# Patient Record
Sex: MALE | Race: WHITE | NOT HISPANIC OR LATINO | Employment: UNEMPLOYED | URBAN - METROPOLITAN AREA
[De-identification: names, ages, dates, MRNs, and addresses within clinical notes are randomized per-mention and may not be internally consistent; named-entity substitution may affect disease eponyms.]

---

## 2017-05-14 ENCOUNTER — NON-PROVIDER VISIT (OUTPATIENT)
Dept: URGENT CARE | Facility: PHYSICIAN GROUP | Age: 57
End: 2017-05-14

## 2017-05-14 DIAGNOSIS — Z02.1 PRE-EMPLOYMENT DRUG SCREENING: ICD-10-CM

## 2017-05-14 LAB
AMP AMPHETAMINE: NORMAL
COC COCAINE: NORMAL
INT CON NEG: NORMAL
INT CON POS: NORMAL
MET METHAMPHETAMINES: NORMAL
OPI OPIATES: NORMAL
PCP PHENCYCLIDINE: NORMAL
POC DRUG COMMENT 753798-OCCUPATIONAL HEALTH: NEGATIVE
THC: NORMAL

## 2017-05-14 PROCEDURE — 80305 DRUG TEST PRSMV DIR OPT OBS: CPT | Performed by: FAMILY MEDICINE

## 2017-07-12 ENCOUNTER — OCCUPATIONAL MEDICINE (OUTPATIENT)
Dept: URGENT CARE | Facility: CLINIC | Age: 57
End: 2017-07-12
Payer: COMMERCIAL

## 2017-07-12 VITALS
HEART RATE: 80 BPM | RESPIRATION RATE: 16 BRPM | HEIGHT: 76 IN | SYSTOLIC BLOOD PRESSURE: 144 MMHG | DIASTOLIC BLOOD PRESSURE: 90 MMHG | WEIGHT: 220 LBS | TEMPERATURE: 97.7 F | OXYGEN SATURATION: 96 % | BODY MASS INDEX: 26.79 KG/M2

## 2017-07-12 DIAGNOSIS — Z23 NEED FOR TETANUS BOOSTER: ICD-10-CM

## 2017-07-12 DIAGNOSIS — S51.832A PUNCTURE WOUND OF LEFT FOREARM, INITIAL ENCOUNTER: ICD-10-CM

## 2017-07-12 DIAGNOSIS — Z02.1 PRE-EMPLOYMENT DRUG SCREENING: ICD-10-CM

## 2017-07-12 LAB
AMP AMPHETAMINE: NORMAL
BREATH ALCOHOL COMMENT: NEGATIVE
COC COCAINE: NORMAL
INT CON NEG: NORMAL
INT CON POS: NORMAL
MET METHAMPHETAMINES: NORMAL
OPI OPIATES: NORMAL
PCP PHENCYCLIDINE: NORMAL
POC BREATHALIZER: 0 PERCENT (ref 0–0.01)
POC DRUG COMMENT 753798-OCCUPATIONAL HEALTH: NORMAL
THC: NORMAL

## 2017-07-12 PROCEDURE — 80305 DRUG TEST PRSMV DIR OPT OBS: CPT | Mod: 29 | Performed by: NURSE PRACTITIONER

## 2017-07-12 PROCEDURE — 99213 OFFICE O/P EST LOW 20 MIN: CPT | Mod: 29,25 | Performed by: NURSE PRACTITIONER

## 2017-07-12 PROCEDURE — 90471 IMMUNIZATION ADMIN: CPT | Mod: 29 | Performed by: NURSE PRACTITIONER

## 2017-07-12 PROCEDURE — 90715 TDAP VACCINE 7 YRS/> IM: CPT | Mod: 29 | Performed by: NURSE PRACTITIONER

## 2017-07-12 PROCEDURE — 82075 ASSAY OF BREATH ETHANOL: CPT | Mod: 29 | Performed by: NURSE PRACTITIONER

## 2017-07-12 RX ORDER — ACETAMINOPHEN 325 MG/1
650 TABLET ORAL EVERY 4 HOURS PRN
COMMUNITY
End: 2018-12-15

## 2017-07-12 ASSESSMENT — ENCOUNTER SYMPTOMS
FEVER: 0
DIAPHORESIS: 0
CHILLS: 0
WEAKNESS: 0

## 2017-07-12 NOTE — Clinical Note
[facility]   [address], [address] [place] [address]  Phone: [phone] - Fax: [phone]        [facility] Progress Report and Disability Certification  Date of Service: 7/12/2017   No Show:  No  Date / Time of Next Visit:     Claim Information   Patient Name: Ajit Gregory  Claim Number:     Employer: [employer]  Date of Injury: 7/12/2017     Insurer / TPA: Sandra Assigned Risk  ID / SSN:     Occupation:   Diagnosis: Diagnoses of Puncture wound of left forearm, initial encounter and Need for tetanus booster were pertinent to this visit.    Medical Information   Related to Industrial Injury? Yes    Subjective Complaints:  DOI 7/12/17  Patient was at work moving a crate.  There was a nail sticking out of the crate, which caused a puncture wound to the left forearm.  The nail appears to be somewhat old and jluis.  Patient cleaned the wound and bleeding was minimal.  He denies any muscle weakness, numbness, or tinging of the arm or hand/fingers.  He is not up to date on his tetanus vaccination.  No prior similar injury.  No baseline limitations or chronic pain.  Not taking any medication to treat the symptoms.  No second job or recreational activity as suspected causative factor.   Objective Findings: Patient is alter, oriented, and in no acute distress.  Puncture wound of the inner aspect of the left forearm.  Wound measures 3 mm long and roughly 1 mm deep.  No active bleeding.  No exposed musculature or bones.  Superficial nature requires no suture or active wound closure.  No erythema, purulence, or pain.  Muscle strength and tone intact.  Sensation intact.  Tdap vaccination administered in clinic.  Patient tolerated well.   Pre-Existing Condition(s):     Assessment:   Initial Visit    Status: Discharged /  MMI  Permanent Disability:No    Plan:   Comments:Keep wound clean and dry.  OTC tylenol or ibuprofen as needed for pain.       Diagnostics:      Comments:       Disability Information   Status: Released to Full Duty    From:     Through:   Restrictions are:     Physical Restrictions   Sitting:    Standing:    Stooping:    Bending:      Squatting:    Walking:    Climbing:    Pushing:      Pulling:    Other:    Reaching Above Shoulder (L):   Reaching Above Shoulder (R):       Reaching Below Shoulder (L):    Reaching Below Shoulder (R):      Not to exceed Weight Limits   Carrying(hrs):   Weight Limit(lb):   Lifting(hrs):   Weight  Limit(lb):     Comments:      Repetitive Actions   Hands: i.e. Fine Manipulations from Grasping:     Feet: i.e. Operating Foot Controls:     Driving / Operate Machinery:     Physician Name: CUATE Bailon Physician Signature: AICHA Carmona e-Signature: Dr. Darnell Omer, Medical Director   Clinic Name / Location: [address], [address] [place] [id number] Clinic Phone Number: Dept: [phone]   Appointment Time: 8:15 Am Visit Start Time: 8:42 AM   Check-In Time:  8:17 Am Visit Discharge Time:  8:55 AM   Original-Treating Physician or Chiropractor    Page 2-Insurer/TPA    Page 3-Employer    Page 4-Employee

## 2017-07-12 NOTE — MR AVS SNAPSHOT
"        Ajit Gregory   2017 8:15 AM   Occupational Medicine   MRN: [id number]    Department:  [facility]   Dept Phone:  [phone]    Description:  Male : 1960   Provider:  CUATE Bailon           Reason for Visit     Puncture Wound puncture wound right forearm today; need for Tdap      Allergies as of 2017     No Known Allergies      You were diagnosed with     Puncture wound of left forearm, initial encounter   [[id number]]       Need for tetanus booster   [[id number]]         Vital Signs     Blood Pressure Pulse Temperature Respirations Height Weight    144/90 mmHg 80 36.5 °C (97.7 °F) 16 1.93 m (6' 3.98\") 99.791 kg (220 lb)    Body Mass Index Oxygen Saturation                26.79 kg/m2 96%          Basic Information     Date Of Birth Sex Race Ethnicity Preferred Language    1960 Male White Non- English      Health Maintenance     Patient has no pending health maintenance at this time      Current Immunizations     Tdap Vaccine  Incomplete      Below and/or attached are the medications your provider expects you to take. Review all of your home medications and newly ordered medications with your provider and/or pharmacist. Follow medication instructions as directed by your provider and/or pharmacist. Please keep your medication list with you and share with your provider. Update the information when medications are discontinued, doses are changed, or new medications (including over-the-counter products) are added; and carry medication information at all times in the event of emergency situations     Allergies:  No Known Allergies          Medications  Valid as of: 2017 -  9:00 AM    Generic Name Brand Name Tablet Size Instructions for use    Acetaminophen (Tab) TYLENOL 325 MG Take 650 mg by mouth every four hours as needed.        Ibuprofen (Tab) MOTRIN 800 MG Take 1 Tab by mouth every 8 hours as needed.        .                 Medicines prescribed today " were sent to:     None      Medication refill instructions:       If your prescription bottle indicates you have medication refills left, it is not necessary to call your provider’s office. Please contact your pharmacy and they will refill your medication.    If your prescription bottle indicates you do not have any refills left, you may request refills at any time through one of the following ways: The online [employer] system (except Urgent Care), by calling your provider’s office, or by asking your pharmacy to contact your provider’s office with a refill request. Medication refills are processed only during regular business hours and may not be available until the next business day. Your provider may request additional information or to have a follow-up visit with you prior to refilling your medication.   *Please Note: Medication refills are assigned a new Rx number when refilled electronically. Your pharmacy may indicate that no refills were authorized even though a new prescription for the same medication is available at the pharmacy. Please request the medicine by name with the pharmacy before contacting your provider for a refill.           [employer] Access Code: [email or web address]  Expires: 8/11/2017  9:00 AM    [employer]  A secure, online tool to manage your health information     [employer]’s [employer]® is a secure, online tool that connects you to your personalized health information from the privacy of your home -- day or night - making it very easy for you to manage your healthcare. Once the activation process is completed, you can even access your medical information using the [employer] oni, which is available for free in the Apple Oni store or Google Play store.     [employer] provides the following levels of access (as shown below):   My Chart Features   [facility] Doctor [facility]  Specialists [facility]  Urgent  Care Non-[facility]  Primary Care  Doctor   Email your healthcare team securely and privately 24/7 X  X X    Manage appointments: schedule your next appointment; view details of past/upcoming appointments X      Request prescription refills. X      View recent personal medical records, including lab and immunizations X X X X   View health record, including health history, allergies, medications X X X X   Read reports about your outpatient visits, procedures, consult and ER notes X X X X   See your discharge summary, which is a recap of your hospital and/or ER visit that includes your diagnosis, lab results, and care plan. X X       How to register for [employer]:  1. Go to  [email or web address].  2. Click on the Sign Up Now box, which takes you to the New Member Sign Up page. You will need to provide the following information:  a. Enter your [employer] Access Code exactly as it appears at the top of this page. (You will not need to use this code after you’ve completed the sign-up process. If you do not sign up before the expiration date, you must request a new code.)   b. Enter your date of birth.   c. Enter your home email address.   d. Click Submit, and follow the next screen’s instructions.  3. Create a Re Pet ID. This will be your [employer] login ID and cannot be changed, so think of one that is secure and easy to remember.  4. Create a [employer] password. You can change your password at any time.  5. Enter your Password Reset Question and Answer. This can be used at a later time if you forget your password.   6. Enter your e-mail address. This allows you to receive e-mail notifications when new information is available in Re Pet.  7. Click Sign Up. You can now view your health information.    For assistance activating your [employer] account, call [phone]

## 2017-07-12 NOTE — PROGRESS NOTES
"Subjective:      Ajit Gregory is a 56 y.o. male who presents with Puncture Wound      DOI 7/12/17  Patient was at work moving a crate.  There was a nail sticking out of the crate, which caused a puncture wound to the left forearm.  The nail appears to be somewhat old and jluis.  Patient cleaned the wound and bleeding was minimal.  He denies any muscle weakness, numbness, or tinging of the arm or hand/fingers.  He is not up to date on his tetanus vaccination.  No prior similar injury.  No baseline limitations or chronic pain.  Not taking any medication to treat the symptoms.  No second job or recreational activity as suspected causative factor.     Puncture Wound         Review of Systems   Constitutional: Negative for fever, chills, malaise/fatigue and diaphoresis.   Neurological: Negative for weakness.     Medications, Allergies, and current problem list reviewed today in Epic     Objective:     /90 mmHg  Pulse 80  Temp(Src) 36.5 °C (97.7 °F)  Resp 16  Ht 1.93 m (6' 3.98\")  Wt 99.791 kg (220 lb)  BMI 26.79 kg/m2  SpO2 96%     Physical Exam    Patient is alter, oriented, and in no acute distress.  Puncture wound of the inner aspect of the left forearm.  Wound measures 3 mm long and roughly 1 mm deep.  No active bleeding.  No exposed musculature or bones.  Superficial nature requires no suture or active wound closure.  No erythema, purulence, or pain.  Muscle strength and tone intact.  Sensation intact.  Tdap vaccination administered in clinic.  Patient tolerated well.     In clinic medication: Tdap vaccine  Patient tolerated well.    Assessment/Plan:     1. Puncture wound of left forearm, initial encounter  Tdap =>6yo IM   2. Need for tetanus booster  Tdap =>7yo IM     Discussed exam findings with patient.  Keep wound clean and dry.  OTC analgesics prn pain.  Discharged MMI.  Patient verbalized understanding of and agreed with plan of care.        "

## 2017-07-12 NOTE — Clinical Note
"EMPLOYEE’S CLAIM FOR COMPENSATION/ REPORT OF INITIAL TREATMENT  FORM C-4    EMPLOYEE’S CLAIM - PROVIDE ALL INFORMATION REQUESTED   First Name  Ajit Last Name  Colt Birthdate                    1960                Sex  male Claim Number   Home Address  [address] Age  56 y.o. Height  1.93 m (6' 3.98\") Weight  99.791 kg (220 lb) [facility]     [place] Zip  [address] Telephone  [phone] (home)    Mailing Address  [address] [place] Zip  [address] Primary Language Spoken  English    Insurer  Sandra Assigned Risk Third Party   Sandra Assigned Risk   Employee's Occupation (Job Title) When Injury or Occupational Disease Occurred      Employer's Name  [employer]  Telephone  [phone]    Employer Address  [address]  Zip  [address]    Date of Injury  7/12/2017               Hour of Injury  7:45 AM Date Employer Notified  7/12/2017 Last Day of Work after Injury or Occupational Disease  7/12/2017 Supervisor to Whom Injury Reported  Ronen   Address or Location of Accident (if applicable)  [[address]]   What were you doing at the time of accident? (if applicable)  Uncrating machinery    How did this injury or occupational disease occur? (Be specific an answer in detail. Use additional sheet if necessary)  Pulling wood off crate,boaurd woth nails in it came back and struck my arm.   If you believe that you have an occupational disease, when did you first have knowledge of the disability and it relationship to your employment?  n/a Witnesses to the Accident  Cj      Nature of Injury or Occupational Disease  Workers' Compensation  Part(s) of Body Injured or Affected  Lower Arm (L), ,     I certify that the above is true and correct to the best of my knowledge and that I have provided this information in order to obtain the benefits of " [place]  I hereby authorize any physician, chiropractor, surgeon, practitioner, or other person, any hospital, including [facility] or [facility], any medical service organization, any insurance company, or other institution or organization to release to each other, any medical or other information, including benefits paid or payable, pertinent to this injury or disease, except information relative to diagnosis, treatment and/or counseling for AIDS, psychological conditions, alcohol or controlled substances, for which I must give specific authorization.  A Photostat of this authorization shall be as valid as the original.     Date 7/12/17   Place [facility]   Employee’s Signature   THIS REPORT MUST BE COMPLETED AND MAILED WITHIN 3 WORKING DAYS OF TREATMENT   Place  [facility]  Name of Facility  [facility]   Date  7/12/2017 Diagnosis  (S51.832A) Puncture wound of left forearm, initial encounter  (Z23) Need for tetanus booster Is there evidence the injured employee was under the influence of alcohol and/or another controlled substance at the time of accident?   Hour  8:42 AM Description of Injury or Disease  Diagnoses of Puncture wound of left forearm, initial encounter and Need for tetanus booster were pertinent to this visit. No   Treatment  Keep wound clean and dry.  Tetanus vaccine administered in clinic.  Have you advised the patient to remain off work five days or more? No   X-Ray Findings      If Yes   From Date  To Date      From information given by the employee, together with medical evidence, can you directly connect this injury or occupational disease as job incurred?  Yes If No Full Duty  Yes Modified Duty      Is additional medical care by a physician indicated?  No  Comments:Discharged MMI. If Modified Duty, Specify any Limitations /  "Restrictions      Do you know of any previous injury or disease contributing to this condition or occupational disease?                            No   Date  7/12/2017 Print Doctor’s Name CUATE Bailon I certify the employer’s copy of  this form was mailed on:   Address  [address], [address] Insurer’s Use Only     [facility] [address] [facility] [address] Zip  [address]    Provider’s Tax ID Number  [id number]  Telephone  Dept: [phone]        e-AICHA Andrews   e-Signature: Dr. Darnell Omer, Medical Director Degree  APRN        ORIGINAL-TREATING PHYSICIAN OR CHIROPRACTOR    PAGE 2-INSURER/TPA    PAGE 3-EMPLOYER    PAGE 4-EMPLOYEE             Form C-4 (rev10/07)              BRIEF DESCRIPTION OF RIGHTS AND BENEFITS  (Pursuant to [place]RS 616C.050)    Notice of Injury or Occupational Disease (Incident Report Form C-1): If an injury or occupational disease (OD) arises out of and in the  course of employment, you must provide written notice to your employer as soon as practicable, but no later than 7 days after the accident or  OD. Your employer shall maintain a sufficient supply of the required forms.    Claim for Compensation (Form C-4): If medical treatment is sought, the form C-4 is available at the place of initial treatment. A completed  \"Claim for Compensation\" (Form C-4) must be filed within 90 days after an accident or OD. The treating physician or chiropractor must,  within 3 working days after treatment, complete and mail to the employer, the employer's insurer and third-party , the Claim for  Compensation.    Medical Treatment: If you require medical treatment for your on-the-job injury or OD, you may be required to select a physician or  chiropractor from a list provided by your workers’ compensation insurer, if it has contracted with an Organization for Managed Care (MCO) or  Preferred Provider Organization (PPO) or providers of health care. If your employer has not entered into " a contract with an MCO or PPO, you  may select a physician or chiropractor from the Panel of Physicians and Chiropractors. Any medical costs related to your industrial injury or  OD will be paid by your insurer.    Temporary Total Disability (TTD): If your doctor has certified that you are unable to work for a period of at least 5 consecutive days, or 5  cumulative days in a 20-day period, or places restrictions on you that your employer does not accommodate, you may be entitled to TTD  compensation.    Temporary Partial Disability (TPD): If the wage you receive upon reemployment is less than the compensation for TTD to which you are  entitled, the insurer may be required to pay you TPD compensation to make up the difference. TPD can only be paid for a maximum of 24  months.    Permanent Partial Disability (PPD): When your medical condition is stable and there is an indication of a PPD as a result of your injury or  OD, within 30 days, your insurer must arrange for an evaluation by a rating physician or chiropractor to determine the degree of your PPD. The  amount of your PPD award depends on the date of injury, the results of the PPD evaluation and your age and wage.    Permanent Total Disability (PTD): If you are medically certified by a treating physician or chiropractor as permanently and totally disabled  and have been granted a PTD status by your insurer, you are entitled to receive monthly benefits not to exceed 66 2/3% of your average  monthly wage. The amount of your PTD payments is subject to reduction if you previously received a PPD award.    Vocational Rehabilitation Services: You may be eligible for vocational rehabilitation services if you are unable to return to the job due to a  permanent physical impairment or permanent restrictions as a result of your injury or occupational disease.    Transportation and Per Forest Reimbursement: You may be eligible for travel expenses and per forest associated with  medical treatment.    Reopening: You may be able to reopen your claim if your condition worsens after claim closure.    Appeal Process: If you disagree with a written determination issued by the insurer or the insurer does not respond to your request, you may  appeal to the Department of Administration, , by following the instructions contained in your determination letter. You must  appeal the determination within 70 days from the date of the determination letter at [address], [address], [place], [place]  [address], or [address], [address], [place], [place] [address]. If you disagree with the  decision, you may appeal to the  Department of Administration, . You must file your appeal within 30 days from the date of the  decision  letter at [address], [address], [place], [place] [address], or [address], [address], [place], [place] [address]. If you  disagree with a decision of an , you may file a petition for judicial review with the District Court. You must do so within 30  days of the Appeal Officer’s decision. You may be represented by an  at your own expense or you may contact the [facility] for possible  representation.    [place]  for Injured Workers (NAIW): If you disagree with a  decision, you may request that NAIW represent you  without charge at an  Hearing. For information regarding denial of benefits, you may contact the [facility] at: [address], [address], [place] [address], [phone], or [address], [address], [place] [address], [phone]    To File a Complaint with the Division: If you wish to file a complaint with the  of the Division of Industrial Relations (DIR),  please contact the Workers’ Compensation Section, [address], [address], [place], [place] [address], telephone [phone]) [phone],  or  [address], [address], [place], [place] [address], telephone [phone]) [phone].    For assistance with Workers’ Compensation Issues: you may contact the Office of the Governor Consumer Health Assistance, [address] [place] [address], [address], [place], [place] [address], Toll Free 1-266.823.1286, Web site: http://[employer].[facility].nv., E-mail  Maria [email or web address].                                                                                                                                                                                                                                   __________________________________________________________________                                                                   ______7/12/17___________                Employee Name / Signature                                                                                                                                                       Date                                                                                                                                                                                                     D-2 (rev. 10/07)

## 2018-09-10 ENCOUNTER — NON-PROVIDER VISIT (OUTPATIENT)
Dept: URGENT CARE | Facility: CLINIC | Age: 58
End: 2018-09-10

## 2018-09-10 DIAGNOSIS — Z02.1 PRE-EMPLOYMENT DRUG SCREENING: ICD-10-CM

## 2018-09-10 LAB
BREATH ALCOHOL COMMENT: NORMAL
POC BREATHALIZER: 0 PERCENT (ref 0–0.01)

## 2018-09-10 PROCEDURE — 82075 ASSAY OF BREATH ETHANOL: CPT | Performed by: FAMILY MEDICINE

## 2018-09-10 PROCEDURE — 80305 DRUG TEST PRSMV DIR OPT OBS: CPT | Performed by: FAMILY MEDICINE

## 2018-11-06 ENCOUNTER — HOSPITAL ENCOUNTER (EMERGENCY)
Dept: HOSPITAL 8 - ED | Age: 58
Discharge: HOME | End: 2018-11-06
Payer: COMMERCIAL

## 2018-11-06 VITALS — DIASTOLIC BLOOD PRESSURE: 86 MMHG | SYSTOLIC BLOOD PRESSURE: 173 MMHG

## 2018-11-06 VITALS — WEIGHT: 266.76 LBS | BODY MASS INDEX: 32.48 KG/M2 | HEIGHT: 76 IN

## 2018-11-06 DIAGNOSIS — S39.012A: Primary | ICD-10-CM
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
## 2018-11-06 DIAGNOSIS — F17.210: ICD-10-CM

## 2018-11-06 PROCEDURE — 99284 EMERGENCY DEPT VISIT MOD MDM: CPT

## 2018-11-06 PROCEDURE — 72110 X-RAY EXAM L-2 SPINE 4/>VWS: CPT

## 2018-12-04 ENCOUNTER — HOSPITAL ENCOUNTER (EMERGENCY)
Dept: HOSPITAL 8 - ED | Age: 58
Discharge: HOME | End: 2018-12-04
Payer: COMMERCIAL

## 2018-12-04 DIAGNOSIS — S39.012A: Primary | ICD-10-CM
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
## 2018-12-04 DIAGNOSIS — G89.29: ICD-10-CM

## 2018-12-04 PROCEDURE — 99283 EMERGENCY DEPT VISIT LOW MDM: CPT

## 2018-12-04 PROCEDURE — 96372 THER/PROPH/DIAG INJ SC/IM: CPT

## 2018-12-08 ENCOUNTER — HOSPITAL ENCOUNTER (OUTPATIENT)
Dept: RADIOLOGY | Facility: MEDICAL CENTER | Age: 58
End: 2018-12-08
Attending: PHYSICIAN ASSISTANT
Payer: COMMERCIAL

## 2018-12-08 ENCOUNTER — OCCUPATIONAL MEDICINE (OUTPATIENT)
Dept: URGENT CARE | Facility: PHYSICIAN GROUP | Age: 58
End: 2018-12-08
Payer: COMMERCIAL

## 2018-12-08 VITALS
SYSTOLIC BLOOD PRESSURE: 138 MMHG | HEART RATE: 104 BPM | HEIGHT: 76 IN | TEMPERATURE: 97.5 F | WEIGHT: 257 LBS | OXYGEN SATURATION: 95 % | BODY MASS INDEX: 31.29 KG/M2 | DIASTOLIC BLOOD PRESSURE: 84 MMHG

## 2018-12-08 DIAGNOSIS — S39.92XD INJURY OF BACK, SUBSEQUENT ENCOUNTER: Primary | ICD-10-CM

## 2018-12-08 DIAGNOSIS — S39.92XD INJURY OF BACK, SUBSEQUENT ENCOUNTER: ICD-10-CM

## 2018-12-08 PROCEDURE — 99214 OFFICE O/P EST MOD 30 MIN: CPT | Mod: 29 | Performed by: PHYSICIAN ASSISTANT

## 2018-12-08 PROCEDURE — 72100 X-RAY EXAM L-S SPINE 2/3 VWS: CPT

## 2018-12-08 RX ORDER — KETOROLAC TROMETHAMINE 30 MG/ML
60 INJECTION, SOLUTION INTRAMUSCULAR; INTRAVENOUS ONCE
Status: COMPLETED | OUTPATIENT
Start: 2018-12-08 | End: 2018-12-08

## 2018-12-08 RX ORDER — NAPROXEN 500 MG/1
500 TABLET ORAL 2 TIMES DAILY WITH MEALS
Qty: 60 TAB | Refills: 0 | Status: SHIPPED | OUTPATIENT
Start: 2018-12-08 | End: 2018-12-22

## 2018-12-08 RX ORDER — METHYLPREDNISOLONE 4 MG/1
4 TABLET ORAL DAILY
Qty: 1 KIT | Refills: 0 | Status: SHIPPED | OUTPATIENT
Start: 2018-12-08 | End: 2020-12-03

## 2018-12-08 RX ADMIN — KETOROLAC TROMETHAMINE 60 MG: 30 INJECTION, SOLUTION INTRAMUSCULAR; INTRAVENOUS at 12:21

## 2018-12-08 ASSESSMENT — ENCOUNTER SYMPTOMS
SHORTNESS OF BREATH: 0
TINGLING: 1
FALLS: 1
CONSTIPATION: 0
COUGH: 0
BACK PAIN: 1
DIARRHEA: 0
CHILLS: 0
NAUSEA: 0
VOMITING: 0
ABDOMINAL PAIN: 0
FEVER: 0
WEAKNESS: 1

## 2018-12-08 NOTE — LETTER
"   [facility] [place]  [address]  [place] [address]  Phone:  [phone] - Fax:  [phone]   [facility] Progress Report and Disability Certification  Date of Service: 12/8/2018   No Show:  No  Date / Time of Next Visit:     Claim Information   Patient Name: Ajit Gregory  Claim Number:     Employer: [employer]  Date of Injury: 10/29/2018     Insurer / TPA: John [place]  ID / SSN:     Occupation: [employer]  Diagnosis: The encounter diagnosis was Injury of back, subsequent encounter.    Medical Information   Related to Industrial Injury? Yes    Subjective Complaints:  DOI 10/29/18: Pt slipped backward on metal and hit head on the ground. Pt was off for 1 week, return to work 11/5/18 and sent home the rest of the week. Pt was originally evaluated by [facility]. Pain is persistent, described as a 12/10 worse with standing. Pt is currently treating Naproxen 500 mg BID x 1 week without improvement. Pt describes as sharp pain on the anterior L thigh with numbness and tingling. Pt denies bowel or bladder incontinence. Pt had XR of lower back done at [facility] without fracture or movement of hardware that was in place from previous surgery. Back surgery done 3 years ago fusion of L4-6 done by [facility].    Objective Findings: /84 (BP Location: Right arm, Patient Position: Sitting)   Pulse (!) 104   Temp 36.4 °C (97.5 °F) (Temporal)   Ht 1.93 m (6' 4\")   Wt 116.6 kg (257 lb)   SpO2 95%   BMI 31.28 kg/m²      Physical Exam   Constitutional: He is oriented to person, place, and time. He appears well-developed and well-nourished. No distress.   HENT:   Head: Normocephalic and atraumatic.   Eyes: Pupils are equal, round, and reactive to light. Conjunctivae are normal.   Cardiovascular: Normal rate and regular rhythm.    Pulmonary/Chest: Effort normal and breath sounds normal.   Musculoskeletal:        Arms:  Neurological: " He is alert and oriented to person, place, and time.   Skin: Skin is warm and dry.   Psychiatric: He has a normal mood and affect. His behavior is normal.   Vitals reviewed.   Pre-Existing Condition(s): Previous spine fusion L5-S1, 3 years ago.   Assessment:   Condition Worsened    Status: Additional Care Required  Permanent Disability:No    Plan: Medication  Comments:Naproxen 500mg BID     Diagnostics: X-ray  Comments:1.  There is degenerative disc disease and arthropathy at the L5-S1 level along with bilateral pedicle screw fixation at this level.    Comments:       Disability Information   Status: Temporarily Totally Disabled    From:  2018  Through:   Restrictions are: Temporary   Physical Restrictions   Sitting:    Standing:  < or = to 1 hr/day Stooping:  < or = to 1 hr/day Bending:  < or = to 1 hr/day   Squattin hrs/day Walking:    Climbing:  < or = to 1 hr/day Pushing:  < or = to 1 hr/day   Pulling:    Other:    Reaching Above Shoulder (L):   Reaching Above Shoulder (R):       Reaching Below Shoulder (L):    Reaching Below Shoulder (R):      Not to exceed Weight Limits   Carrying(hrs):   Weight Limit(lb):   Lifting(hrs):   Weight  Limit(lb):     Comments:      Repetitive Actions   Hands: i.e. Fine Manipulations from Grasping:     Feet: i.e. Operating Foot Controls:     Driving / Operate Machinery:     Physician Name: Andie Viera P.A.-C. Physician Signature: ANDIE Angel P.A.-C. e-Signature: Dr. Darnell Omer, Medical Director   Clinic Name / Location: [address] [address] Clinic Phone Number: Dept: [phone]   Appointment Time: 11:10 Am Visit Start Time: 11:33 AM   Check-In Time:  11:25 Am Visit Discharge Time:     Original-Treating Physician or Chiropractor    Page 2-Insurer/TPA    Page 3-Employer    Page 4-Employee

## 2018-12-08 NOTE — PROGRESS NOTES
Subjective:   Ajit Gregory is a 58 y.o. male who presents for Follow-Up ( [facility] lower back pain )    DOI 10/29/18: Pt slipped backward on metal and hit head on the ground. Pt was off for 1 week, return to work 11/5/18 and sent home the rest of the week. Pt was originally evaluated by [facility]. Pain is persistent, described as a 12/10 worse with standing. Pt is currently treating Naproxen 500 mg BID x 1 week without improvement. Pt describes as sharp pain on the anterior L thigh with numbness and tingling. Pt denies bowel or bladder incontinence. Pt had XR of lower back done at [facility] without fracture or movement of hardware that was in place from previous surgery. Back surgery done 3 years ago fusion of L4-6 done by [facility].    HPI  Review of Systems   Constitutional: Negative for chills, fever and malaise/fatigue.   Respiratory: Negative for cough and shortness of breath.    Gastrointestinal: Negative for abdominal pain, constipation, diarrhea, nausea and vomiting.   Musculoskeletal: Positive for back pain and falls.   Neurological: Positive for tingling and weakness.   All other systems reviewed and are negative.      PMH:  has no past medical history on file.  MEDS:   Current Outpatient Prescriptions:   •  MethylPREDNISolone (MEDROL DOSEPAK) 4 MG Tablet Therapy Pack, Take 1 Tab by mouth every day., Disp: 1 Kit, Rfl: 0  •  naproxen (NAPROSYN) 500 MG Tab, Take 1 Tab by mouth 2 times a day, with meals for 14 days., Disp: 60 Tab, Rfl: 0  •  acetaminophen (TYLENOL) 325 MG Tab, Take 650 mg by mouth every four hours as needed., Disp: , Rfl:   ALLERGIES: No Known Allergies  SURGHX: History reviewed. No pertinent surgical history.  SOCHX:  reports that he has never smoked. He has never used smokeless tobacco.  History reviewed. No pertinent family history.     Objective:   /84 (BP Location: Right arm, Patient Position: Sitting)   Pulse (!) 104   Temp 36.4 °C (97.5 °F) (Temporal)    "Ht 1.93 m (6' 4\")   Wt 116.6 kg (257 lb)   SpO2 95%   BMI 31.28 kg/m²     Physical Exam   Constitutional: He is oriented to person, place, and time. He appears well-developed and well-nourished. No distress.   HENT:   Head: Normocephalic and atraumatic.   Eyes: Pupils are equal, round, and reactive to light. Conjunctivae are normal.   Cardiovascular: Normal rate and regular rhythm.    Pulmonary/Chest: Effort normal and breath sounds normal.   Musculoskeletal:        Arms:  Neurological: He is alert and oriented to person, place, and time.   Skin: Skin is warm and dry.   Psychiatric: He has a normal mood and affect. His behavior is normal.   Vitals reviewed.    XR:   1.  There is degenerative disc disease and arthropathy at the L5-S1 level along with bilateral pedicle screw fixation at this level.  2.  There is no acute fracture of the lumbar spine or malalignment.  3.  There is multilevel degenerative endplate spurring and lumbar spondylosis.  4.  There is chronic appearing loss of height at L1 and L2 levels.      Assessment/Plan:     1. Injury of back, subsequent encounter  DX-LUMBAR SPINE-2 OR 3 VIEWS    ketorolac (TORADOL) injection 60 mg    REFERRAL TO OCCUPATIONAL MEDICINE    MethylPREDNISolone (MEDROL DOSEPAK) 4 MG Tablet Therapy Pack    naproxen (NAPROSYN) 500 MG Tab     IM ketorolac administered in office by MA.    Per my read, no acute fracture seen on x-ray.  Agree with radiology report.  Follow-up with occupational medicine in 3 days.     If symptoms worsen or persist patient can contact me or return to clinic for follow-up.  A thorough review of Red flags and strict emergency room precautions discussed.  Patient and daughter appears understanding of information.         "

## 2018-12-15 ENCOUNTER — APPOINTMENT (OUTPATIENT)
Dept: RADIOLOGY | Facility: MEDICAL CENTER | Age: 58
End: 2018-12-15
Attending: EMERGENCY MEDICINE
Payer: COMMERCIAL

## 2018-12-15 ENCOUNTER — HOSPITAL ENCOUNTER (EMERGENCY)
Facility: MEDICAL CENTER | Age: 58
End: 2018-12-15
Attending: EMERGENCY MEDICINE
Payer: COMMERCIAL

## 2018-12-15 VITALS
OXYGEN SATURATION: 90 % | RESPIRATION RATE: 18 BRPM | WEIGHT: 255.73 LBS | TEMPERATURE: 97.7 F | HEIGHT: 76 IN | DIASTOLIC BLOOD PRESSURE: 79 MMHG | SYSTOLIC BLOOD PRESSURE: 116 MMHG | BODY MASS INDEX: 31.14 KG/M2 | HEART RATE: 98 BPM

## 2018-12-15 DIAGNOSIS — M54.50 LUMBAR BACK PAIN: ICD-10-CM

## 2018-12-15 DIAGNOSIS — M47.816 OSTEOARTHRITIS OF LUMBAR SPINE, UNSPECIFIED SPINAL OSTEOARTHRITIS COMPLICATION STATUS: Primary | ICD-10-CM

## 2018-12-15 PROCEDURE — 96374 THER/PROPH/DIAG INJ IV PUSH: CPT

## 2018-12-15 PROCEDURE — 700111 HCHG RX REV CODE 636 W/ 250 OVERRIDE (IP): Performed by: EMERGENCY MEDICINE

## 2018-12-15 PROCEDURE — 96375 TX/PRO/DX INJ NEW DRUG ADDON: CPT

## 2018-12-15 PROCEDURE — 72148 MRI LUMBAR SPINE W/O DYE: CPT

## 2018-12-15 PROCEDURE — 99284 EMERGENCY DEPT VISIT MOD MDM: CPT

## 2018-12-15 RX ORDER — METHYLPREDNISOLONE SODIUM SUCCINATE 40 MG/ML
40 INJECTION, POWDER, LYOPHILIZED, FOR SOLUTION INTRAMUSCULAR; INTRAVENOUS ONCE
Status: COMPLETED | OUTPATIENT
Start: 2018-12-15 | End: 2018-12-15

## 2018-12-15 RX ORDER — MORPHINE SULFATE 4 MG/ML
4 INJECTION, SOLUTION INTRAMUSCULAR; INTRAVENOUS ONCE
Status: COMPLETED | OUTPATIENT
Start: 2018-12-15 | End: 2018-12-15

## 2018-12-15 RX ORDER — HYDROCODONE BITARTRATE AND ACETAMINOPHEN 5; 325 MG/1; MG/1
1-2 TABLET ORAL EVERY 8 HOURS PRN
Qty: 15 TAB | Refills: 0 | Status: SHIPPED | OUTPATIENT
Start: 2018-12-15 | End: 2018-12-20

## 2018-12-15 RX ORDER — ONDANSETRON 2 MG/ML
4 INJECTION INTRAMUSCULAR; INTRAVENOUS ONCE
Status: COMPLETED | OUTPATIENT
Start: 2018-12-15 | End: 2018-12-15

## 2018-12-15 RX ORDER — CYCLOBENZAPRINE HCL 10 MG
10 TABLET ORAL 3 TIMES DAILY PRN
Qty: 30 TAB | Refills: 0 | Status: SHIPPED | OUTPATIENT
Start: 2018-12-15 | End: 2020-12-03

## 2018-12-15 RX ORDER — LORAZEPAM 2 MG/ML
1 INJECTION INTRAMUSCULAR ONCE
Status: COMPLETED | OUTPATIENT
Start: 2018-12-15 | End: 2018-12-15

## 2018-12-15 RX ADMIN — LORAZEPAM 1 MG: 2 INJECTION INTRAMUSCULAR; INTRAVENOUS at 10:27

## 2018-12-15 RX ADMIN — METHYLPREDNISOLONE SODIUM SUCCINATE 40 MG: 40 INJECTION, POWDER, FOR SOLUTION INTRAMUSCULAR; INTRAVENOUS at 10:34

## 2018-12-15 RX ADMIN — MORPHINE SULFATE 4 MG: 4 INJECTION INTRAVENOUS at 10:30

## 2018-12-15 RX ADMIN — ONDANSETRON 4 MG: 2 INJECTION INTRAMUSCULAR; INTRAVENOUS at 10:31

## 2018-12-15 ASSESSMENT — PAIN SCALES - GENERAL
PAINLEVEL_OUTOF10: 10
PAINLEVEL_OUTOF10: 5

## 2018-12-15 NOTE — LETTER
[facility], EMERGENCY DEPT   [address] [place] [address]  Phone: Dept: [phone] - Fax:        [facility] Progress Report and Disability Certification  Date of Service: 12/15/2018   No Show:  No  Date / Time of Next Visit:     Claim Information   Patient Name: Ajit Gregory  Claim Number:     Employer: [employer]  Date of Injury: 10/29/2018     Insurer / TPA: [employer] [place] ID / SSN:    Occupation: [employer] Diagnosis: The primary encounter diagnosis was Osteoarthritis of lumbar spine, unspecified spinal osteoarthritis complication status. A diagnosis of Lumbar back pain was also pertinent to this visit.    Medical Information   Related to Industrial Injury? Yes ***   Subjective Complaints:  Lumbar back pain previous back surgery.  Injured his back at work.   Objective Findings: M.   Pre-Existing Condition(s):     Assessment:   Initial Visit    Status: Additional Care Required  Permanent Disability:No    Plan: Medication    Diagnostics: MRI    Comments:       Disability Information   Status: Temporarily Totally Disabled    From:     Through:   Restrictions are:     Physical Restrictions   Sitting:    Standing:    Stooping:    Bending:      Squatting:    Walking:    Climbing:    Pushing:      Pulling:    Other:    Reaching Above Shoulder (L):   Reaching Above Shoulder (R):       Reaching Below Shoulder (L):    Reaching Below Shoulder (R):      Not to exceed Weight Limits   Carrying(hrs):   Weight Limit(lb):   Lifting(hrs):   Weight  Limit(lb):     Comments:      Repetitive Actions   Hands: i.e. Fine Manipulations from Grasping:     Feet: i.e. Operating Foot Controls:     Driving / Operate Machinery:     Physician Name: Gansert, Gary Physician Signature: e-SignGANSERT, GARY M.D. e-Codi , Medical Directo MD   Clinic Name / Location: [facility],  EMERGENCY DEPT  [address]  [place] [address]  [phone]     Clinic Phone Number: Dept: [phone]   Appointment Time:  Visit Start Time:    Check-In Time:  9:48 AM Visit Discharge Time:    Original-Treating Physician or Chiropractor    Page 2-Insurer/TPA    Page 3-Employer    Page 4-Employee

## 2018-12-15 NOTE — LETTER
[facility], EMERGENCY DEPT   [address] [place] [address]  Phone: Dept: [phone] - Fax:        [facility] Progress Report and Disability Certification  Date of Service: 12/15/2018   No Show:  No  Date / Time of Next Visit:     Claim Information   Patient Name: Ajit Gregory  Claim Number:     Employer: [employer]  Date of Injury: 10/29/2018     Insurer / TPA: John [place] ID / -   Occupation: [employer] Diagnosis: There were no encounter diagnoses.    Medical Information   Related to Industrial Injury?   ***   Subjective Complaints:      Objective Findings:     Pre-Existing Condition(s):     Assessment:        Status:    Permanent Disability:     Plan:      Diagnostics:      Comments:       Disability Information   Status:      From:     Through:   Restrictions are:     Physical Restrictions   Sitting:    Standing:    Stooping:    Bending:      Squatting:    Walking:    Climbing:    Pushing:      Pulling:    Other:    Reaching Above Shoulder (L):   Reaching Above Shoulder (R):       Reaching Below Shoulder (L):    Reaching Below Shoulder (R):      Not to exceed Weight Limits   Carrying(hrs):   Weight Limit(lb):   Lifting(hrs):   Weight  Limit(lb):     Comments:      Repetitive Actions   Hands: i.e. Fine Manipulations from Grasping:     Feet: i.e. Operating Foot Controls:     Driving / Operate Machinery:     Physician Name: Gansert, Gary Physician Signature:  MD e-Signature:  , Medical Director   Clinic Name / Location: [facility], EMERGENCY DEPT  [address] [place] [address] [phone]     Clinic Phone Number: Dept: [phone]   Appointment Time:  Visit Start Time:    Check-In Time:  9:48 AM Visit Discharge Time:    Original-Treating Physician or Chiropractor    Page 2-Insurer/TPA    Page 3-Employer    Page 4-Employee

## 2018-12-15 NOTE — ED NOTES
Pt was initially evaluated at [facility] after a work related fall approximately 1 month ago.  He has continued to experience recurring pain exacerbations affecting his back and legs since the injury.

## 2018-12-15 NOTE — ED PROVIDER NOTES
"ED Provider Note  CHIEF COMPLAINT  Chief Complaint   Patient presents with   • Back Pain   • Leg Pain       HPI  Ajit Gregory is a 58 y.o. male who presents complaining of persistent pain in the lumbar back radiating down his left leg.  He has had previous surgery on his back with rods in his back.  He injured his back at work about 4 weeks ago.  He had an x-ray previously at the [facility] urgent care.  It shows some degenerative changes and some chronic compression of discs.  He is been unable to go back to work.  He has an appointment to follow-up at the [facility] occupational health clinic but will not be for another 2-1/2 weeks.  He has persistent pain.  Unable to sleep.  He has had to use vodka to sleep.    REVIEW OF SYSTEMS  No headache, no chest pain, no difficulty breathing.  No bowel or bladder dysfunction.  ALL OTHER SYSTEMS NEGATIVE    ALLERGIES  No Known Allergies    CURRENT MEDICATIONS  Negative    PAST MEDICAL HISTORY  Previous lumbar back surgery.  Work-related back injury.    SURGICAL HISTORY  Previous lumbar back surgery with rods.    FAMILY HISTORY  Negative    SOCIAL HISTORY  Cigarette smoker and daily alcohol user.    PHYSICAL EXAM  GENERAL: Alert male adult  VITAL SIGNS: /79   Pulse 98   Temp 36.5 °C (97.7 °F) (Temporal)   Resp 18   Ht 1.93 m (6' 4\")   Wt 116 kg (255 lb 11.7 oz)   SpO2 95%   BMI 31.13 kg/m²    Constitutional: Alert healthy-appearing adult HENT: Scalp is normal size and nontender. Ears are clear. Nose is clear. Throat is clear with no stridor no drooling no trismus. Teeth are all intact.  Eyes: Pupils equal round and reactive to light, extraocular motor fall. There is no scleral icterus.  Neck: Neck is supple and nontender. There is no meningismus. No adenitis.  Cardiovascular: Heart regular rhythm without murmurs or gallops   Thorax & Lungs: No chest wall tenderness. Lungs are clear. Patient has good breath sounds bilateral. No rales, no rhonchi, no " wheezes.  Abdomen: Abdomen is soft, nontender, not rigid, no guarding, and no organomegaly. There is no palpable hernia   Skin: Warm, pink, and dry with no erythema and no rash.   Back: Nontender, no midline bony tenderness, no flank tenderness.  Extremities: Full range of motion  No tenderness to palpation and no deformities noted. No calf or thigh swelling. No calf or thigh tenderness. No clinical DVT.  Neurologic: Alert & oriented . Cranial nerves are grossly intact as tested. Patient moves all 4 extremities well. Patient has good strong flexion and extension of the ankle joints knee joints hip joints and elbow joints. Sensation is normal and symmetrical in the upper and lower extremities.   Psychiatric: Patient is alert oriented coherent and rational.     RADIOLOGY/PROCEDURES  MR-LUMBAR SPINE-W/O   Final Result      Moderate to severe degenerative changes in the lumbar spine as described above.        COURSE & MEDICAL DECISION MAKING  Patient injured his back at work about 4 weeks ago.  He had an x-ray which showed no significant acute change.  He had some degenerative changes.  He does not have a follow-up appointment with the [facility] occupational health clinic for another 2-1/2 weeks.  He is in significant pain and has to take vodka at night to sleep.  No obvious neurological deficit.  Has had some problems with constipation.    Plan: #1 IV #2 intravenous morphine, Ativan, Zofran.  #3 lumbar MRI.    MRI of the spine shows some moderate to severe degenerative changes in the lumbar spine.  No emergent issues.    Home treatment: #1 Flexeril No. 2 hydrocodone No. 3 follow-up at the [facility] on Monday.    FINAL IMPRESSION  1.  Acute and subacute lumbar back injury.  Work-related.  2.   degenerative changes in the lumbar spine.        Electronically signed by: Gary Gansert, 12/15/2018 /40 5 PM.

## 2018-12-15 NOTE — ED NOTES
Pt back from MRI. States that pain is somewhat better. Has no needs or complaints. Call light within reach

## 2020-11-02 ENCOUNTER — HOSPITAL ENCOUNTER (EMERGENCY)
Facility: MEDICAL CENTER | Age: 60
End: 2020-11-02
Attending: EMERGENCY MEDICINE

## 2020-11-02 ENCOUNTER — APPOINTMENT (OUTPATIENT)
Dept: RADIOLOGY | Facility: MEDICAL CENTER | Age: 60
End: 2020-11-02
Attending: EMERGENCY MEDICINE

## 2020-11-02 VITALS
HEART RATE: 93 BPM | SYSTOLIC BLOOD PRESSURE: 118 MMHG | BODY MASS INDEX: 29.93 KG/M2 | TEMPERATURE: 98.7 F | OXYGEN SATURATION: 96 % | DIASTOLIC BLOOD PRESSURE: 60 MMHG | RESPIRATION RATE: 18 BRPM | WEIGHT: 245.81 LBS | HEIGHT: 76 IN

## 2020-11-02 DIAGNOSIS — I82.4Y2 ACUTE DEEP VEIN THROMBOSIS (DVT) OF PROXIMAL VEIN OF LEFT LOWER EXTREMITY (HCC): ICD-10-CM

## 2020-11-02 LAB
ALBUMIN SERPL BCP-MCNC: 4.2 G/DL (ref 3.2–4.9)
ALBUMIN/GLOB SERPL: 1.4 G/DL
ALP SERPL-CCNC: 119 U/L (ref 30–99)
ALT SERPL-CCNC: 18 U/L (ref 2–50)
ANION GAP SERPL CALC-SCNC: 12 MMOL/L (ref 7–16)
AST SERPL-CCNC: 16 U/L (ref 12–45)
BASOPHILS # BLD AUTO: 0.7 % (ref 0–1.8)
BASOPHILS # BLD: 0.05 K/UL (ref 0–0.12)
BILIRUB SERPL-MCNC: 0.6 MG/DL (ref 0.1–1.5)
BUN SERPL-MCNC: 11 MG/DL (ref 8–22)
CALCIUM SERPL-MCNC: 9.1 MG/DL (ref 8.4–10.2)
CHLORIDE SERPL-SCNC: 106 MMOL/L (ref 96–112)
CO2 SERPL-SCNC: 22 MMOL/L (ref 20–33)
CREAT SERPL-MCNC: 0.89 MG/DL (ref 0.5–1.4)
EOSINOPHIL # BLD AUTO: 0.15 K/UL (ref 0–0.51)
EOSINOPHIL NFR BLD: 2 % (ref 0–6.9)
ERYTHROCYTE [DISTWIDTH] IN BLOOD BY AUTOMATED COUNT: 46.2 FL (ref 35.9–50)
GLOBULIN SER CALC-MCNC: 2.9 G/DL (ref 1.9–3.5)
GLUCOSE SERPL-MCNC: 93 MG/DL (ref 65–99)
HCT VFR BLD AUTO: 52.1 % (ref 42–52)
HGB BLD-MCNC: 18.4 G/DL (ref 14–18)
IMM GRANULOCYTES # BLD AUTO: 0.02 K/UL (ref 0–0.11)
IMM GRANULOCYTES NFR BLD AUTO: 0.3 % (ref 0–0.9)
LYMPHOCYTES # BLD AUTO: 1.87 K/UL (ref 1–4.8)
LYMPHOCYTES NFR BLD: 24.9 % (ref 22–41)
MCH RBC QN AUTO: 31.5 PG (ref 27–33)
MCHC RBC AUTO-ENTMCNC: 35.3 G/DL (ref 33.7–35.3)
MCV RBC AUTO: 89.1 FL (ref 81.4–97.8)
MONOCYTES # BLD AUTO: 0.51 K/UL (ref 0–0.85)
MONOCYTES NFR BLD AUTO: 6.8 % (ref 0–13.4)
NEUTROPHILS # BLD AUTO: 4.9 K/UL (ref 1.82–7.42)
NEUTROPHILS NFR BLD: 65.3 % (ref 44–72)
NRBC # BLD AUTO: 0 K/UL
NRBC BLD-RTO: 0 /100 WBC
PLATELET # BLD AUTO: 143 K/UL (ref 164–446)
PMV BLD AUTO: 9.7 FL (ref 9–12.9)
POTASSIUM SERPL-SCNC: 4 MMOL/L (ref 3.6–5.5)
PROT SERPL-MCNC: 7.1 G/DL (ref 6–8.2)
RBC # BLD AUTO: 5.85 M/UL (ref 4.7–6.1)
SODIUM SERPL-SCNC: 140 MMOL/L (ref 135–145)
WBC # BLD AUTO: 7.5 K/UL (ref 4.8–10.8)

## 2020-11-02 PROCEDURE — 93971 EXTREMITY STUDY: CPT | Mod: LT

## 2020-11-02 PROCEDURE — 80053 COMPREHEN METABOLIC PANEL: CPT

## 2020-11-02 PROCEDURE — 85025 COMPLETE CBC W/AUTO DIFF WBC: CPT

## 2020-11-02 PROCEDURE — 96374 THER/PROPH/DIAG INJ IV PUSH: CPT

## 2020-11-02 PROCEDURE — 99284 EMERGENCY DEPT VISIT MOD MDM: CPT

## 2020-11-02 PROCEDURE — 700111 HCHG RX REV CODE 636 W/ 250 OVERRIDE (IP): Performed by: EMERGENCY MEDICINE

## 2020-11-02 PROCEDURE — A9270 NON-COVERED ITEM OR SERVICE: HCPCS | Performed by: EMERGENCY MEDICINE

## 2020-11-02 PROCEDURE — 700102 HCHG RX REV CODE 250 W/ 637 OVERRIDE(OP): Performed by: EMERGENCY MEDICINE

## 2020-11-02 PROCEDURE — 36415 COLL VENOUS BLD VENIPUNCTURE: CPT

## 2020-11-02 RX ADMIN — FENTANYL CITRATE 100 MCG: 50 INJECTION, SOLUTION INTRAMUSCULAR; INTRAVENOUS at 22:32

## 2020-11-02 RX ADMIN — RIVAROXABAN 15 MG: 15 TABLET, FILM COATED ORAL at 23:37

## 2020-11-02 ASSESSMENT — PAIN SCALES - WONG BAKER: WONGBAKER_NUMERICALRESPONSE: HURTS AS MUCH AS POSSIBLE

## 2020-11-03 ASSESSMENT — ENCOUNTER SYMPTOMS
SHORTNESS OF BREATH: 0
FEVER: 0
VOMITING: 0
ABDOMINAL PAIN: 0
NAUSEA: 0

## 2020-11-03 NOTE — ED PROVIDER NOTES
"ED Provider Note    Means of arrival: private vehicle  History obtained from: patient  History limited by: none    CHIEF COMPLAINT  Chief Complaint   Patient presents with   • Leg Swelling       HPI  Ajit Gregory is a 59 y.o. male who presents to the Emergency Department for leg swelling.  Patient reports that for the last 3 weeks he has had left leg swelling.  He has no primary care physician has not had this checked out.  He has a Workmen's Comp back injury and went for an appointment with his back doctor today who advised him to come to the emergency department for assessment of his left lower extremity swelling given concern for DVT.  Patient reports mild throbbing pain in the left lower extremity.  Denies numbness, tingling or weakness.  Denies fevers or chills.    REVIEW OF SYSTEMS  Review of Systems   Constitutional: Negative for fever.   Respiratory: Negative for shortness of breath.    Cardiovascular: Positive for leg swelling. Negative for chest pain.   Gastrointestinal: Negative for abdominal pain, nausea and vomiting.   All other systems reviewed and are negative.        PAST MEDICAL HISTORY   None    SURGICAL HISTORY  patient denies any surgical history    SOCIAL HISTORY  Social History     Tobacco Use   • Smoking status: Current Every Day Smoker     Types: Cigars   • Smokeless tobacco: Never Used   Substance Use Topics   • Alcohol use: Not Currently   • Drug use: No      Social History     Substance and Sexual Activity   Drug Use No       FAMILY HISTORY  History reviewed. No pertinent family history.    CURRENT MEDICATIONS  Home Medications    **Home medications have not yet been reviewed for this encounter**         ALLERGIES  No Known Allergies    PHYSICAL EXAM  VITAL SIGNS: BP (!) 161/86   Pulse 93   Temp 37.1 °C (98.7 °F) (Temporal)   Resp 18   Ht 1.93 m (6' 4\")   Wt 111.5 kg (245 lb 13 oz)   SpO2 96%   BMI 29.92 kg/m²   Vitals reviewed by myself.  Physical Exam   Constitutional: He " is oriented to person, place, and time and well-developed, well-nourished, and in no distress. No distress.   HENT:   Head: Normocephalic and atraumatic.   Eyes: Pupils are equal, round, and reactive to light. EOM are normal.   Neck: Normal range of motion.   Cardiovascular: Normal rate and regular rhythm.   2+ bilateral distal pedal pulses   Abdominal: Soft.   Musculoskeletal:      Comments: Patient has full range of motion in bilateral lower extremities.  He is noted to have left leg swelling without pitting edema.  Tenderness to palpation of the left calf   Neurological: He is alert and oriented to person, place, and time.   Sensation intact in bilateral lower extremities   Skin: Skin is warm and dry.         DIAGNOSTIC STUDIES /  LABS  Labs Reviewed   CBC WITH DIFFERENTIAL - Abnormal; Notable for the following components:       Result Value    Hemoglobin 18.4 (*)     Hematocrit 52.1 (*)     Platelet Count 143 (*)     All other components within normal limits   COMP METABOLIC PANEL - Abnormal; Notable for the following components:    Alkaline Phosphatase 119 (*)     All other components within normal limits   ESTIMATED GFR       All labs reviewed by me.      RADIOLOGY  US-EXTREMITY VENOUS LOWER UNILAT LEFT   Final Result         1.  Extensive partially occlusive DVT throughout the left lower extremity from the superficial femoral vein extending through the anterior tibial and posterior tibial veins.   2.  Thrombus of the gastrocnemius vein, appears occlusive.      These findings were discussed with the patient's clinician, Lina Stanton, on 11/2/2020 11:06 PM.        The radiologist's interpretation of all radiological studies have been reviewed by me.      COURSE & MEDICAL DECISION MAKING  Nursing notes, VS, PMSFHx reviewed in chart.    Patient is a 59-year-old male who comes in for evaluation of left leg pain.  Differential diagnosis includes DVT, electrolyte disturbance, fluid retention, cellulitis.   Diagnostic work-up includes labs and left lower extremity ultrasound.    Patient's initial vitals are notable for slight tachycardia and hypertension, he reports he is having back pain from his chronic back pain for which he is treated with fentanyl.  Tachycardia and hypertension improved after treatment with fentanyl.  Labs returned and are unremarkable.  Ultrasound of the lower extremity returns demonstrates extensive DVT.  Therefore patient is advised on management of DVT.  He will be started on Xarelto given his normal renal function.  I have left a message with case management to follow-up on the patient in the morning as he is not insured, and I want to make sure that he is able to get his Xarelto covered.  He is also given information on follow-up with anticoagulation clinic.  Patient is then given strict return precautions and discharged in stable condition.      FINAL IMPRESSION  1. Acute deep vein thrombosis (DVT) of proximal vein of left lower extremity (HCC)

## 2020-11-03 NOTE — DISCHARGE PLANNING
Anticipated Discharge Disposition: Home already    Action: Request to ensure that he get RX. Self pay. Does not choose to use Work Comp at this time, unsure if related to back injury. Aware xarelto cost can be high. He will pay out of pocket per his report. Ensured he had number to [facility] [phone]  and will call to make a appt today. Reviewed singlecare, covermymed, prescriptionhope aand xarelto programs. He will review these. Declines any further assistance at this time.     Barriers to Discharge: None    Plan: ER CM will follow up later today

## 2020-11-03 NOTE — ED TRIAGE NOTES
Pt c/o LLE swelling and calf pain x a few weeks. Pt denies recent surgery or trauma. Denies SOB.    Negative COVID screening

## 2020-11-03 NOTE — ED NOTES
Discharge instructions provided.  Pt verbalized the understanding of discharge instructions to follow up with PCP/Antigoagulation clinic, and to return to ER if condition worsens.  Pt ambulated out of ER without difficulty.

## 2020-11-04 ENCOUNTER — TELEPHONE (OUTPATIENT)
Dept: VASCULAR LAB | Facility: MEDICAL CENTER | Age: 60
End: 2020-11-04

## 2020-11-04 NOTE — DISCHARGE PLANNING
Daniel left at [facility] [phone] with pt info, advised seen in ER. He was going to call and make appt today, following up.

## 2020-11-04 NOTE — TELEPHONE ENCOUNTER
Received anticoagulation referral for Xarelto due to acute DVT from Dr. Stanton on 11/2/2020    S/w pt. Pt will call us back to establish care as he is unable to ambulate right now.  He informed me he paid $500 for the prescription of the loading doses. Per chart review, he declined services provided by the RN Case Manager in the ER.    Insurance: Worker's Comp  PCP: None  Locations to be seen: [facility] at [phone], fax [phone]    Sammi Parks, KrishnaD

## 2020-11-11 ENCOUNTER — TELEPHONE (OUTPATIENT)
Dept: VASCULAR LAB | Facility: MEDICAL CENTER | Age: 60
End: 2020-11-11

## 2020-11-11 NOTE — TELEPHONE ENCOUNTER
Received anticoagulation referral for Xarelto due to acute DVT from Dr. Stanton on 11/2/2020     S/w pt. Pt will call us back to establish care as he is unable to ambulate right now.  He informed me he paid $500 for the prescription of the loading doses. Per chart review, he declined services provided by the RN Case Manager in the ER.    Second attempt to reach pt. LVM to establish care.     Insurance: Worker's Comp  PCP: None  Locations to be seen: [facility] at [phone], fax [phone]     Sammi Parks, KrishnaD

## 2020-11-18 ENCOUNTER — TELEPHONE (OUTPATIENT)
Dept: VASCULAR LAB | Facility: MEDICAL CENTER | Age: 60
End: 2020-11-18

## 2020-11-18 NOTE — TELEPHONE ENCOUNTER
Received anticoagulation referral for Xarelto due to acute DVT from Dr. Stanton on 11/2/2020     3rd attempt to reach pt. LVM to establish care.     Insurance: Worker's Comp  PCP: None  Locations to be seen: [facility] at [phone], fax [phone]     Sammi Parks, KrishnaD

## 2020-11-24 ENCOUNTER — TELEPHONE (OUTPATIENT)
Dept: VASCULAR LAB | Facility: MEDICAL CENTER | Age: 60
End: 2020-11-24

## 2020-11-25 NOTE — TELEPHONE ENCOUNTER
[facility]      Called and spoke to the pt to establish care regarding anticoagulation referral for Xarelto due to acute DVT from Dr. Lina Stanton on 11/2/20.    Patient reports that he made an appt already, and upon further review he has appt with new PCP Dr. Ron Edouard.  Patient is hoping that his new doctor will agree to monitor him instead of having to have 2 separate appts. Will follow up with patient after scheduled appt.        Insurance: Workers Comp  PCP: none  Locations to be seen: MAIN     Phone number left for return call or any questions or concerns.  [facility] at [phone], fax [phone]      Suma KellerD

## 2020-12-03 ENCOUNTER — TELEMEDICINE (OUTPATIENT)
Dept: MEDICAL GROUP | Facility: PHYSICIAN GROUP | Age: 60
End: 2020-12-03

## 2020-12-03 VITALS — TEMPERATURE: 97.4 F | BODY MASS INDEX: 30.44 KG/M2 | WEIGHT: 250 LBS | HEIGHT: 76 IN

## 2020-12-03 DIAGNOSIS — I82.402 ACUTE DEEP VEIN THROMBOSIS (DVT) OF LEFT LOWER EXTREMITY, UNSPECIFIED VEIN (HCC): ICD-10-CM

## 2020-12-03 PROBLEM — I82.409 DEEP VEIN THROMBOSIS (DVT) OF LOWER EXTREMITY (HCC): Status: ACTIVE | Noted: 2020-12-03

## 2020-12-03 PROCEDURE — 99203 OFFICE O/P NEW LOW 30 MIN: CPT | Performed by: FAMILY MEDICINE

## 2020-12-03 RX ORDER — ASPIRIN 325 MG
325 TABLET ORAL EVERY 6 HOURS PRN
COMMUNITY
End: 2020-12-04

## 2020-12-03 ASSESSMENT — FIBROSIS 4 INDEX: FIB4 SCORE: 1.53

## 2020-12-03 NOTE — PROGRESS NOTES
Virtual Visit: New Patient   This visit was conducted via [employer] using secure and encrypted videoconferencing technology. The patient was in a private location in the state of [place].    The patient's identity was confirmed and verbal consent was obtained for this virtual visit.    Subjective:     CC:   Chief Complaint   Patient presents with   • \[facility]\"" Care     1+ mo lt leg swelling w clots       Ajit Gregory is a 59 y.o. male presenting to establish care and to discuss the evaluation and management of:    #Left leg DVT  This is a new condition.    Symptom onset:  Diagnosed with left leg DVT on 11/2/2020  Current symptoms: Denies any   Since onset symptoms are: Unchanged  Modifying factors: He is uninsured.  He was supposed to be started on Xarelto. He ran out 11/25/20. He cant afford further refill.  follow-up with vascular medicine.  Treatments tried: was on xarelto.   Associated symptoms: Denies any.      ROS  See HPI  Constitutional: Negative for fever, chills and malaise/fatigue.   HENT: Negative for congestion.    Eyes: Negative for pain.   Respiratory: Negative for cough and shortness of breath.    Cardiovascular: Negative for leg swelling.   Gastrointestinal: Negative for nausea, vomiting, abdominal pain and diarrhea.   Genitourinary: Negative for dysuria and hematuria.   Skin: Negative for rash.   Neurological: Negative for dizziness, focal weakness and headaches.   Endo/Heme/Allergies: Does not bruise/bleed easily.   Psychiatric/Behavioral: Negative for depression.  The patient is not nervous/anxious.      No Known Allergies    Current medicines (including changes today)  Current Outpatient Medications   Medication Sig Dispense Refill   • aspirin (ASA) 325 MG Tab Take 325 mg by mouth every 6 hours as needed. 4-6 per day       No current facility-administered medications for this visit.        He  has a past medical history of DVT (deep venous thrombosis) ([facility]).  He  has no past surgical  "history on file.      Family History   Problem Relation Age of Onset   • No Known Problems Mother    • No Known Problems Father      No family status information on file.       Patient Active Problem List    Diagnosis Date Noted   • Deep vein thrombosis (DVT) of lower extremity (HCC) 12/03/2020          Objective:   Temp 36.3 °C (97.4 °F) (Oral) Comment (Src): pt reported  Ht 1.93 m (6' 4\") Comment: pt reported  Wt 113.4 kg (250 lb) Comment: pt reported  BMI 30.43 kg/m²     Physical Exam:  Constitutional: Alert, no distress, well-groomed.  Skin: No rashes in visible areas.  Eye: Round. Conjunctiva clear, lids normal. No icterus.   ENMT: Lips pink without lesions, good dentition, moist mucous membranes. Phonation normal.  Neck: No masses, no thyromegaly. Moves freely without pain.  Respiratory: Unlabored respiratory effort, no cough or audible wheeze  Psych: Alert and oriented x3, normal affect and mood.       Assessment and Plan:   The following treatment plan was discussed:     1. Acute deep vein thrombosis (DVT) of left lower extremity, unspecified vein (HCC)  This is a new problem  The patient was diagnosed with a left leg DVT 11/20 and was started on Xarelto. Unfortunately, he is uninsured and is unable to afford the medicine as it costs $800. He is also possibly linking this to his current workers comp case. I mentioned to the patient that if he is unable to afford the medicine he may need to go on warfarin which is much more affordable at the cost of frequent blood monitoring. I don't manage warfarin and workers comp either. Thus, I strongly suggest that he establishes with vascular medicine for anticoagulation, possibly with warfarin. He verbalized understanding and feels comfortable with doing so. Contact info provided today.  Strict ED precautions given and education was given about his diagnosis. He is agreeable to plan.     Other orders  - aspirin (ASA) 325 MG Tab; Take 325 mg by mouth every 6 hours as " needed. 4-6 per day        Follow-up: Return in about 3 months (around 3/3/2021).

## 2020-12-04 ENCOUNTER — TELEPHONE (OUTPATIENT)
Dept: VASCULAR LAB | Facility: MEDICAL CENTER | Age: 60
End: 2020-12-04

## 2020-12-04 DIAGNOSIS — I82.409 DEEP VEIN THROMBOSIS (DVT) OF LOWER EXTREMITY, UNSPECIFIED CHRONICITY, UNSPECIFIED LATERALITY, UNSPECIFIED VEIN (HCC): ICD-10-CM

## 2020-12-04 RX ORDER — WARFARIN SODIUM 5 MG/1
5 TABLET ORAL DAILY
Qty: 30 TAB | Refills: 3 | Status: SHIPPED | OUTPATIENT
Start: 2020-12-04 | End: 2020-12-08

## 2020-12-05 NOTE — TELEPHONE ENCOUNTER
[facility]    Called to follow up with the patient in regards to anticoagulation referral for Xarelto due to acute DVT from Dr. Lina Stanton on 11/2/20.     Patient was hoping to establish care with new PCP Dr. Ron Edouard and have him monitor his anticoagulation. However, patient reports that he recommended patient accept our services instead.   The patient has an appt set up for clinic on Monday.     Patient reports that he has not been on anticoagulation since last Thursday, as he ran out of Xarelto and it is too expensive. He was hoping to transition to warfarin. In light of recent acute DVT, I will send in a rx for warfarin and lovenox for the patient to start taking over the weekend and he will follow up with provider in clinic on Monday.     Patient understands and given on-call pharmacist info if needed this weekend.     Suma KellerD

## 2020-12-07 ENCOUNTER — ANTICOAGULATION VISIT (OUTPATIENT)
Dept: MEDICAL GROUP | Facility: PHYSICIAN GROUP | Age: 60
End: 2020-12-07

## 2020-12-07 DIAGNOSIS — I82.402 ACUTE DEEP VEIN THROMBOSIS (DVT) OF LEFT LOWER EXTREMITY, UNSPECIFIED VEIN (HCC): ICD-10-CM

## 2020-12-07 PROCEDURE — 99211 OFF/OP EST MAY X REQ PHY/QHP: CPT | Performed by: INTERNAL MEDICINE

## 2020-12-07 NOTE — PROGRESS NOTES
NEW DOAC   .  Anticoagulation Summary  As of 12/7/2020    INR goal:     TTR:  --   INR used for dosing:  No new INR was available at the time of this encounter.   Warfarin maintenance plan:  No maintenance plan   Next INR check:  12/21/2020   Target end date:      Indications    Deep vein thrombosis (DVT) of lower extremity (HCC) [I82.409]             Anticoagulation Episode Summary     INR check location:      Preferred lab:      Send INR reminders to:      Comments:        Anticoagulation Care Providers     Provider Role Specialty Phone number    Lina Stanton M.D. Referring Emergency Medicine [phone]    [facility] Responsible  [phone]        Anticoagulation Patient Findings      PCP: Ron Edouard M.D.  Cardiologist: none  Dx: DVT  CHADSVASC = n/a  HAS-BLED = 0  Target End Date = three months    Pt Hx:  Patient diagnosed with DVT 11-2-20 after several weeks of unexplained leg swelling.  Currently out of work secondary to back injury suffered on the job earlier in the fall.  Patient is light smoker, no plans to quit at this time despite discussion of overall health benefits and risk of future VTE.  He was started on Xarelto by hospital team, but is unable to afford and is looking to transition to warfarin.    Labs:  Lab Results   Component Value Date/Time    WBC 7.5 11/02/2020 10:25 PM    RBC 5.85 11/02/2020 10:25 PM    HEMOGLOBIN 18.4 (H) 11/02/2020 10:25 PM    HEMATOCRIT 52.1 (H) 11/02/2020 10:25 PM    MCV 89.1 11/02/2020 10:25 PM    MCH 31.5 11/02/2020 10:25 PM    MCHC 35.3 11/02/2020 10:25 PM    MPV 9.7 11/02/2020 10:25 PM    NEUTSPOLYS 65.30 11/02/2020 10:25 PM    LYMPHOCYTES 24.90 11/02/2020 10:25 PM    MONOCYTES 6.80 11/02/2020 10:25 PM    EOSINOPHILS 2.00 11/02/2020 10:25 PM    BASOPHILS 0.70 11/02/2020 10:25 PM      Lab Results   Component Value Date/Time    SODIUM 140 11/02/2020 10:25 PM    POTASSIUM 4.0 11/02/2020 10:25 PM    CHLORIDE 106 11/02/2020 10:25 PM     CO2 22 11/02/2020 10:25 PM    GLUCOSE 93 11/02/2020 10:25 PM    BUN 11 11/02/2020 10:25 PM    CREATININE 0.89 11/02/2020 10:25 PM          Pt is new to Xarelto/warfarin and new to [facility]. Discussed:   · Indication for DOAC therapy.  · Importance of monitoring and compliance.   · Monitoring parameters, signs and symptoms of bleeding or clotting.  · DOAC therapy, side effects, potential DDIs, OTC medications  · Hormonal therapy - n/a  · ASA - none  · DDI - none  · Lifestyle safety, ie smoking, ETOH, hobby safety, fall safety/prevention  · Procedures for missed doses or suspected missed doses, surgeries/procedures, travel, dental work, any medication changes    Discussed with patient the options available for anticoagulation and monitoring involved with each.  He is unable to afford DOAC, but also unable to afford appointments and labs involved with warfarin monitoring.    Given that he will need ~ 2 1/2 more months of therapy, it was decided that samples and coupon card for Xarelto would be best decision.  He understands he may need to come out of pocket for small portion of last Xarelto fill.  He has completed initial 21 day course of Xarelto and has been without medication for ~five to six days.  At this point, will have him transition to 20mg daily going forward.    DOAC affordable = No    Samples provided: Yes, provided 28 days worth of samples as well as 30 day free trial card and copay card    Labs to be completed prior to next f/u - CBC, CMP    F/U - Two weeks by phone as visit copay is unaffordable    Jerrod Snider, PharmD, BCACP    CC  Dr Michael Bloch

## 2020-12-08 DIAGNOSIS — I82.409 DEEP VEIN THROMBOSIS (DVT) OF LOWER EXTREMITY, UNSPECIFIED CHRONICITY, UNSPECIFIED LATERALITY, UNSPECIFIED VEIN (HCC): ICD-10-CM

## 2020-12-21 ENCOUNTER — TELEPHONE (OUTPATIENT)
Dept: VASCULAR LAB | Facility: MEDICAL CENTER | Age: 60
End: 2020-12-21

## 2020-12-21 ENCOUNTER — ANTICOAGULATION VISIT (OUTPATIENT)
Dept: MEDICAL GROUP | Facility: PHYSICIAN GROUP | Age: 60
End: 2020-12-21

## 2020-12-21 ENCOUNTER — TELEPHONE (OUTPATIENT)
Dept: MEDICAL GROUP | Facility: PHYSICIAN GROUP | Age: 60
End: 2020-12-21

## 2020-12-21 DIAGNOSIS — I82.409 DEEP VEIN THROMBOSIS (DVT) OF LOWER EXTREMITY, UNSPECIFIED CHRONICITY, UNSPECIFIED LATERALITY, UNSPECIFIED VEIN (HCC): ICD-10-CM

## 2020-12-21 DIAGNOSIS — I82.402 ACUTE DEEP VEIN THROMBOSIS (DVT) OF LEFT LOWER EXTREMITY, UNSPECIFIED VEIN (HCC): Primary | ICD-10-CM

## 2020-12-21 PROCEDURE — 99999 PR NO CHARGE: CPT | Performed by: INTERNAL MEDICINE

## 2020-12-21 NOTE — TELEPHONE ENCOUNTER
Telephone Appointment Visit   As a means of avoiding spread of COVID-19, this visit is being conducted by telephone. This telephone visit was initiated by the patient and they verbally consented.    Reason for Call:  Anticoagulation Follow up    HPI:    Patient with recent DVT, started on anticoagulation with Xarelto 11-2-20.  No interval changes to health status or medications since last visit.  Patient endorses continued resolution of symptoms of DVT.  He continues to struggle with activity level secondary to back pain.     Labs / Images Reviewed:                 Lab Results   Component Value Date    SODIUM 140 11/02/2020    POTASSIUM 4.0 11/02/2020    CHLORIDE 106 11/02/2020    CO2 22 11/02/2020    GLUCOSE 93 11/02/2020    BUN 11 11/02/2020    CREATININE 0.89 11/02/2020     Lab Results   Component Value Date    WBC 7.5 11/02/2020    RBC 5.85 11/02/2020    HEMOGLOBIN 18.4 (H) 11/02/2020    HEMATOCRIT 52.1 (H) 11/02/2020    MCV 89.1 11/02/2020    MCH 31.5 11/02/2020    MCHC 35.3 11/02/2020    MPV 9.7 11/02/2020           Assessment and Plan:     Health Status Since Last Assessment   Patient denies any new relevant medical problems, ED visits or hospitalizations   Patient denies any embolic events (stroke/tia/systemic embolism)    Adherence with DOAC Therapy   Pt has NO missed any doses in the average week    Bleeding Risk Assessment     Denies Epistaxis   Pt denies any excessive or unusual bleeding/hematomas.  Pt denies any GI bleeds or hematemesis.  Pt denies any concerning daily headache or sub dural hematoma symptoms.     Pt denies any hematuria.   Latest Hemoglobin 18.4   ETOH overuse Negative     Creatinine Clearance/Renal Function     Latest ClCr >50 mL/min    Hepatic function   Latest LFTs WNL   Pt denies any history of liver dysfunction      Drug Interactions   Platelets: 143   ASA/other antiplatelets Negative   NSAID Negative   Other drug interactions Negative   X Verified no anticonvulsant or azole  therapy, education provided for future use.    Final Assessment and Recommendations:   Patient appears stable from the anticoagulation standpoint.     Benefits of continued DOAC therapy outweigh risks for this patient   Recommend pt continue with current DOAC therapy Xarelto 20mg daily.     Other Actions: cmp/ cbc hemogram ordered prior to next visit    Follow up:   Will follow up with patient 1 month    Jerrod Snider, KrishnaD, BCACP

## 2020-12-22 NOTE — TELEPHONE ENCOUNTER
Initial anticoagulation clinic note and most recent ED visit reviewed    Patient started on anticoagulation due to extensive DVT possibly provoked by decreased ambulation due to back pain    We will treat for 3 months and then have patient follow-up with either PCP or vascular medicine visit to determine whether or not to continue anticoagulation and/or pursue further work-up    Will defer any indicated age appropriate screening for occult malignancy to pcp.    Michael Bloch, MD  Anticoagulation Clinic    Cc:      GERARDO Edouard

## 2021-03-11 ENCOUNTER — TELEPHONE (OUTPATIENT)
Dept: VASCULAR LAB | Facility: MEDICAL CENTER | Age: 61
End: 2021-03-11

## 2021-03-12 ENCOUNTER — TELEPHONE (OUTPATIENT)
Dept: VASCULAR LAB | Facility: MEDICAL CENTER | Age: 61
End: 2021-03-12

## 2021-03-12 ENCOUNTER — TELEPHONE (OUTPATIENT)
Dept: MEDICAL GROUP | Facility: PHYSICIAN GROUP | Age: 61
End: 2021-03-12

## 2021-03-12 NOTE — TELEPHONE ENCOUNTER
Patient overdue for anticoagulation follow-up and canceled his initial vascular medicine consultation for length of therapy without rescheduling.    Will ask clinical pharmacist reach out to patient to determine whether or not he is still on anticoagulation and arrange for appropriate follow-up    Await further patient contact or recommendations from PCP or pharmacist after that discussion    Michael Bloch, MD  Vascular Medicine    CC: Ron Edouard

## 2021-03-13 NOTE — TELEPHONE ENCOUNTER
Noted pt w/ provoked DVT and on OAC x 3 months to be completed in March 2021 pending vascular medicine recommendations.     Patient is overdue for his follow-up and canceled his vascular medicine length of therapy visit.    [facility] will f/u accordingly regarding disposition of anticoagulation and plan moving forward.    Dayron Martin, PharmD

## 2021-03-13 NOTE — TELEPHONE ENCOUNTER
Received a call back from the pt.  He is currently uninsured and unable to be seen in our clinic. He cannot afford the copay.  [facility].    Pt is in agreement and grateful for the information        LETHA Simental  [facility]

## 2021-03-15 DIAGNOSIS — Z23 NEED FOR VACCINATION: ICD-10-CM

## 2021-04-07 ENCOUNTER — TELEPHONE (OUTPATIENT)
Dept: VASCULAR LAB | Facility: MEDICAL CENTER | Age: 61
End: 2021-04-07

## 2021-04-26 ENCOUNTER — TELEPHONE (OUTPATIENT)
Dept: MEDICAL GROUP | Facility: MEDICAL CENTER | Age: 61
End: 2021-04-26

## 2021-04-26 NOTE — TELEPHONE ENCOUNTER
S/w pt asking if he was able to establish with [facility]. He was not able to establish w/ [facility] but was able to enroll in Medicaid.    Asked if he could reschedule initial Vascular visit however, pt recently lost his home/means of transportation and disability/worker's comp. He will be having an appt with ?neurosurgery (he is unsure of the provider's name) on 5/6/21 in hopes of reestablishing with disabilitiy/worker's comp and does not want to schedule appts until after 5/6.    Will f/u after 5/6    Sammi Parks, PharmD

## 2021-05-11 ENCOUNTER — TELEPHONE (OUTPATIENT)
Dept: VASCULAR LAB | Facility: MEDICAL CENTER | Age: 61
End: 2021-05-11

## 2021-05-11 NOTE — TELEPHONE ENCOUNTER
LVM to assess if pt can establish with Vascular.  Of note - we would be able to set up Uber ride for his initial appt.    Sammi Parks, KrishnaD

## 2021-05-25 ENCOUNTER — TELEPHONE (OUTPATIENT)
Dept: VASCULAR LAB | Facility: MEDICAL CENTER | Age: 61
End: 2021-05-25

## 2021-05-25 NOTE — TELEPHONE ENCOUNTER
[facility]    Pt now has medicaid.  S/W patient to set up appointment for anticoagulation and subsequently with vascular provider.  Pt continues to decline an appointment despite education given and warning. He would like to call our clinic later after thinking about it.  I will follow up again in 1-2 weeks.     Phil Luong, PharmD

## 2021-06-07 ENCOUNTER — TELEPHONE (OUTPATIENT)
Dept: VASCULAR LAB | Facility: MEDICAL CENTER | Age: 61
End: 2021-06-07

## 2021-06-07 ENCOUNTER — ANTICOAGULATION MONITORING (OUTPATIENT)
Dept: VASCULAR LAB | Facility: MEDICAL CENTER | Age: 61
End: 2021-06-07

## 2021-06-07 NOTE — TELEPHONE ENCOUNTER
[facility]      Spoke with pt and he kindly declines our services. He does not want to be seen by Anticoagulation or our Vascular providers. He reports he does not want to be on anticoagulants and did not want to discuss alternatives to Xarelto.  He would prefer to work through his new workman's compensation doctor that he has (I neglected to get their name).    Pt reports he will call our clinic if he changes his mind.  Provided our direct phone number.    Phil Luong, PharmD    CC  Dr Edouard (PCP)  Dr tSanton (referring MD)  Dr Bloch

## 2021-09-22 ENCOUNTER — APPOINTMENT (OUTPATIENT)
Dept: RADIOLOGY | Facility: MEDICAL CENTER | Age: 61
End: 2021-09-22
Attending: EMERGENCY MEDICINE
Payer: MEDICAID

## 2021-09-22 ENCOUNTER — HOSPITAL ENCOUNTER (OUTPATIENT)
Facility: MEDICAL CENTER | Age: 61
End: 2021-09-26
Attending: EMERGENCY MEDICINE | Admitting: GENERAL PRACTICE
Payer: MEDICAID

## 2021-09-22 DIAGNOSIS — R10.9 ABDOMINAL PAIN, UNSPECIFIED ABDOMINAL LOCATION: ICD-10-CM

## 2021-09-22 DIAGNOSIS — K55.069 SUPERIOR MESENTERIC VEIN THROMBOSIS (HCC): ICD-10-CM

## 2021-09-22 PROBLEM — E87.1 HYPONATREMIA: Status: ACTIVE | Noted: 2021-09-22

## 2021-09-22 PROBLEM — R33.9 URINARY RETENTION: Status: ACTIVE | Noted: 2021-09-22

## 2021-09-22 PROBLEM — D69.1 THROMBOCYTOPATHIA (HCC): Status: ACTIVE | Noted: 2021-09-22

## 2021-09-22 PROBLEM — F17.200 TOBACCO USE DISORDER: Status: ACTIVE | Noted: 2021-09-22

## 2021-09-22 LAB
ALBUMIN SERPL BCP-MCNC: 3.9 G/DL (ref 3.2–4.9)
ALBUMIN/GLOB SERPL: 1.1 G/DL
ALP SERPL-CCNC: 159 U/L (ref 30–99)
ALT SERPL-CCNC: 18 U/L (ref 2–50)
ANION GAP SERPL CALC-SCNC: 16 MMOL/L (ref 7–16)
APPEARANCE UR: ABNORMAL
APTT PPP: 30.4 SEC (ref 24.7–36)
AST SERPL-CCNC: 17 U/L (ref 12–45)
BACTERIA #/AREA URNS HPF: ABNORMAL /HPF
BASOPHILS # BLD AUTO: 0.2 % (ref 0–1.8)
BASOPHILS # BLD: 0.03 K/UL (ref 0–0.12)
BILIRUB SERPL-MCNC: 0.7 MG/DL (ref 0.1–1.5)
BILIRUB UR QL STRIP.AUTO: NEGATIVE
BUN SERPL-MCNC: 10 MG/DL (ref 8–22)
CALCIUM SERPL-MCNC: 9.1 MG/DL (ref 8.4–10.2)
CHLORIDE SERPL-SCNC: 100 MMOL/L (ref 96–112)
CHOLEST SERPL-MCNC: 154 MG/DL (ref 100–199)
CO2 SERPL-SCNC: 17 MMOL/L (ref 20–33)
COLOR UR: ABNORMAL
CREAT SERPL-MCNC: 0.97 MG/DL (ref 0.5–1.4)
EKG IMPRESSION: NORMAL
EOSINOPHIL # BLD AUTO: 0 K/UL (ref 0–0.51)
EOSINOPHIL NFR BLD: 0 % (ref 0–6.9)
EPI CELLS #/AREA URNS HPF: ABNORMAL /HPF
ERYTHROCYTE [DISTWIDTH] IN BLOOD BY AUTOMATED COUNT: 54 FL (ref 35.9–50)
EST. AVERAGE GLUCOSE BLD GHB EST-MCNC: 126 MG/DL
GLOBULIN SER CALC-MCNC: 3.4 G/DL (ref 1.9–3.5)
GLUCOSE BLD-MCNC: 188 MG/DL (ref 65–99)
GLUCOSE BLD-MCNC: 203 MG/DL (ref 65–99)
GLUCOSE SERPL-MCNC: 222 MG/DL (ref 65–99)
GLUCOSE UR STRIP.AUTO-MCNC: 250 MG/DL
HBA1C MFR BLD: 6 % (ref 4–5.6)
HCT VFR BLD AUTO: 54.5 % (ref 42–52)
HDLC SERPL-MCNC: 28 MG/DL
HGB BLD-MCNC: 18.3 G/DL (ref 14–18)
IMM GRANULOCYTES # BLD AUTO: 0.08 K/UL (ref 0–0.11)
IMM GRANULOCYTES NFR BLD AUTO: 0.6 % (ref 0–0.9)
INR PPP: 0.95 (ref 0.87–1.13)
KETONES UR STRIP.AUTO-MCNC: ABNORMAL MG/DL
LACTATE BLD-SCNC: 2.8 MMOL/L (ref 0.5–2)
LACTATE BLD-SCNC: 2.8 MMOL/L (ref 0.5–2)
LDLC SERPL CALC-MCNC: 78 MG/DL
LEUKOCYTE ESTERASE UR QL STRIP.AUTO: NEGATIVE
LIPASE SERPL-CCNC: 18 U/L (ref 7–58)
LYMPHOCYTES # BLD AUTO: 0.67 K/UL (ref 1–4.8)
LYMPHOCYTES NFR BLD: 5.3 % (ref 22–41)
MCH RBC QN AUTO: 31 PG (ref 27–33)
MCHC RBC AUTO-ENTMCNC: 33.6 G/DL (ref 33.7–35.3)
MCV RBC AUTO: 92.4 FL (ref 81.4–97.8)
MICRO URNS: ABNORMAL
MONOCYTES # BLD AUTO: 0.66 K/UL (ref 0–0.85)
MONOCYTES NFR BLD AUTO: 5.2 % (ref 0–13.4)
MUCOUS THREADS #/AREA URNS HPF: ABNORMAL /HPF
NEUTROPHILS # BLD AUTO: 11.3 K/UL (ref 1.82–7.42)
NEUTROPHILS NFR BLD: 88.7 % (ref 44–72)
NITRITE UR QL STRIP.AUTO: NEGATIVE
NRBC # BLD AUTO: 0 K/UL
NRBC BLD-RTO: 0 /100 WBC
PH UR STRIP.AUTO: 5 [PH] (ref 5–8)
PLATELET # BLD AUTO: 159 K/UL (ref 164–446)
PMV BLD AUTO: 9.8 FL (ref 9–12.9)
POTASSIUM SERPL-SCNC: 4.2 MMOL/L (ref 3.6–5.5)
PROCALCITONIN SERPL-MCNC: 0.09 NG/ML
PROT SERPL-MCNC: 7.3 G/DL (ref 6–8.2)
PROT UR QL STRIP: NEGATIVE MG/DL
PROTHROMBIN TIME: 11.9 SEC (ref 12–14.6)
RBC # BLD AUTO: 5.9 M/UL (ref 4.7–6.1)
RBC # URNS HPF: ABNORMAL /HPF
RBC UR QL AUTO: NEGATIVE
SODIUM SERPL-SCNC: 133 MMOL/L (ref 135–145)
SP GR UR STRIP.AUTO: >=1.03
TRIGL SERPL-MCNC: 238 MG/DL (ref 0–149)
UNIDENT CRYS URNS QL MICRO: ABNORMAL /HPF
WBC # BLD AUTO: 12.7 K/UL (ref 4.8–10.8)
WBC #/AREA URNS HPF: ABNORMAL /HPF

## 2021-09-22 PROCEDURE — 90686 IIV4 VACC NO PRSV 0.5 ML IM: CPT | Performed by: GENERAL PRACTICE

## 2021-09-22 PROCEDURE — 85610 PROTHROMBIN TIME: CPT

## 2021-09-22 PROCEDURE — 83036 HEMOGLOBIN GLYCOSYLATED A1C: CPT

## 2021-09-22 PROCEDURE — 81001 URINALYSIS AUTO W/SCOPE: CPT

## 2021-09-22 PROCEDURE — 303105 HCHG CATHETER EXTRA

## 2021-09-22 PROCEDURE — 700111 HCHG RX REV CODE 636 W/ 250 OVERRIDE (IP): Performed by: GENERAL PRACTICE

## 2021-09-22 PROCEDURE — 80061 LIPID PANEL: CPT

## 2021-09-22 PROCEDURE — 36415 COLL VENOUS BLD VENIPUNCTURE: CPT

## 2021-09-22 PROCEDURE — 93005 ELECTROCARDIOGRAM TRACING: CPT | Performed by: EMERGENCY MEDICINE

## 2021-09-22 PROCEDURE — 99406 BEHAV CHNG SMOKING 3-10 MIN: CPT | Performed by: GENERAL PRACTICE

## 2021-09-22 PROCEDURE — 80053 COMPREHEN METABOLIC PANEL: CPT

## 2021-09-22 PROCEDURE — 99220 PR INITIAL OBSERVATION CARE,LEVL III: CPT | Mod: 25 | Performed by: GENERAL PRACTICE

## 2021-09-22 PROCEDURE — 83690 ASSAY OF LIPASE: CPT

## 2021-09-22 PROCEDURE — 96376 TX/PRO/DX INJ SAME DRUG ADON: CPT | Mod: XU

## 2021-09-22 PROCEDURE — 85730 THROMBOPLASTIN TIME PARTIAL: CPT

## 2021-09-22 PROCEDURE — G0378 HOSPITAL OBSERVATION PER HR: HCPCS

## 2021-09-22 PROCEDURE — 96374 THER/PROPH/DIAG INJ IV PUSH: CPT | Mod: XU

## 2021-09-22 PROCEDURE — A9270 NON-COVERED ITEM OR SERVICE: HCPCS | Performed by: GENERAL PRACTICE

## 2021-09-22 PROCEDURE — 83605 ASSAY OF LACTIC ACID: CPT

## 2021-09-22 PROCEDURE — 51702 INSERT TEMP BLADDER CATH: CPT

## 2021-09-22 PROCEDURE — 94760 N-INVAS EAR/PLS OXIMETRY 1: CPT

## 2021-09-22 PROCEDURE — 700117 HCHG RX CONTRAST REV CODE 255: Performed by: EMERGENCY MEDICINE

## 2021-09-22 PROCEDURE — 99285 EMERGENCY DEPT VISIT HI MDM: CPT

## 2021-09-22 PROCEDURE — 82962 GLUCOSE BLOOD TEST: CPT | Mod: 91

## 2021-09-22 PROCEDURE — 700105 HCHG RX REV CODE 258: Performed by: GENERAL PRACTICE

## 2021-09-22 PROCEDURE — 96372 THER/PROPH/DIAG INJ SC/IM: CPT | Mod: XU

## 2021-09-22 PROCEDURE — 90471 IMMUNIZATION ADMIN: CPT

## 2021-09-22 PROCEDURE — 96375 TX/PRO/DX INJ NEW DRUG ADDON: CPT | Mod: XU

## 2021-09-22 PROCEDURE — 74177 CT ABD & PELVIS W/CONTRAST: CPT

## 2021-09-22 PROCEDURE — 700105 HCHG RX REV CODE 258: Performed by: EMERGENCY MEDICINE

## 2021-09-22 PROCEDURE — 84145 PROCALCITONIN (PCT): CPT

## 2021-09-22 PROCEDURE — 700102 HCHG RX REV CODE 250 W/ 637 OVERRIDE(OP): Performed by: GENERAL PRACTICE

## 2021-09-22 PROCEDURE — 85025 COMPLETE CBC W/AUTO DIFF WBC: CPT

## 2021-09-22 PROCEDURE — 700111 HCHG RX REV CODE 636 W/ 250 OVERRIDE (IP): Performed by: EMERGENCY MEDICINE

## 2021-09-22 RX ORDER — DEXTROSE MONOHYDRATE 25 G/50ML
50 INJECTION, SOLUTION INTRAVENOUS
Status: DISCONTINUED | OUTPATIENT
Start: 2021-09-22 | End: 2021-09-26 | Stop reason: HOSPADM

## 2021-09-22 RX ORDER — MORPHINE SULFATE 4 MG/ML
4 INJECTION, SOLUTION INTRAMUSCULAR; INTRAVENOUS ONCE
Status: COMPLETED | OUTPATIENT
Start: 2021-09-22 | End: 2021-09-22

## 2021-09-22 RX ORDER — SODIUM CHLORIDE 9 MG/ML
1000 INJECTION, SOLUTION INTRAVENOUS ONCE
Status: COMPLETED | OUTPATIENT
Start: 2021-09-22 | End: 2021-09-22

## 2021-09-22 RX ORDER — SODIUM CHLORIDE, SODIUM LACTATE, POTASSIUM CHLORIDE, CALCIUM CHLORIDE 600; 310; 30; 20 MG/100ML; MG/100ML; MG/100ML; MG/100ML
1000 INJECTION, SOLUTION INTRAVENOUS ONCE
Status: COMPLETED | OUTPATIENT
Start: 2021-09-22 | End: 2021-09-22

## 2021-09-22 RX ORDER — OXYCODONE HYDROCHLORIDE 5 MG/1
5 TABLET ORAL
Status: DISCONTINUED | OUTPATIENT
Start: 2021-09-22 | End: 2021-09-26 | Stop reason: HOSPADM

## 2021-09-22 RX ORDER — ACETAMINOPHEN 325 MG/1
650 TABLET ORAL EVERY 6 HOURS PRN
Status: DISCONTINUED | OUTPATIENT
Start: 2021-09-22 | End: 2021-09-26 | Stop reason: HOSPADM

## 2021-09-22 RX ORDER — GABAPENTIN 300 MG/1
600 CAPSULE ORAL DAILY
COMMUNITY
End: 2023-03-17

## 2021-09-22 RX ORDER — SODIUM CHLORIDE 9 MG/ML
INJECTION, SOLUTION INTRAVENOUS CONTINUOUS
Status: DISCONTINUED | OUTPATIENT
Start: 2021-09-22 | End: 2021-09-26 | Stop reason: HOSPADM

## 2021-09-22 RX ORDER — PROMETHAZINE HYDROCHLORIDE 25 MG/1
12.5-25 TABLET ORAL EVERY 4 HOURS PRN
Status: DISCONTINUED | OUTPATIENT
Start: 2021-09-22 | End: 2021-09-26 | Stop reason: HOSPADM

## 2021-09-22 RX ORDER — ONDANSETRON 2 MG/ML
4 INJECTION INTRAMUSCULAR; INTRAVENOUS EVERY 4 HOURS PRN
Status: DISCONTINUED | OUTPATIENT
Start: 2021-09-22 | End: 2021-09-26 | Stop reason: HOSPADM

## 2021-09-22 RX ORDER — HYDROMORPHONE HYDROCHLORIDE 1 MG/ML
0.5 INJECTION, SOLUTION INTRAMUSCULAR; INTRAVENOUS; SUBCUTANEOUS
Status: DISCONTINUED | OUTPATIENT
Start: 2021-09-22 | End: 2021-09-26 | Stop reason: HOSPADM

## 2021-09-22 RX ORDER — OXYCODONE HYDROCHLORIDE 10 MG/1
10 TABLET ORAL
Status: DISCONTINUED | OUTPATIENT
Start: 2021-09-22 | End: 2021-09-26 | Stop reason: HOSPADM

## 2021-09-22 RX ORDER — ONDANSETRON 4 MG/1
4 TABLET, ORALLY DISINTEGRATING ORAL EVERY 4 HOURS PRN
Status: DISCONTINUED | OUTPATIENT
Start: 2021-09-22 | End: 2021-09-26 | Stop reason: HOSPADM

## 2021-09-22 RX ORDER — PROCHLORPERAZINE EDISYLATE 5 MG/ML
5-10 INJECTION INTRAMUSCULAR; INTRAVENOUS EVERY 4 HOURS PRN
Status: DISCONTINUED | OUTPATIENT
Start: 2021-09-22 | End: 2021-09-26 | Stop reason: HOSPADM

## 2021-09-22 RX ORDER — LABETALOL HYDROCHLORIDE 5 MG/ML
10 INJECTION, SOLUTION INTRAVENOUS EVERY 4 HOURS PRN
Status: DISCONTINUED | OUTPATIENT
Start: 2021-09-22 | End: 2021-09-26 | Stop reason: HOSPADM

## 2021-09-22 RX ORDER — PROMETHAZINE HYDROCHLORIDE 25 MG/1
12.5-25 SUPPOSITORY RECTAL EVERY 4 HOURS PRN
Status: DISCONTINUED | OUTPATIENT
Start: 2021-09-22 | End: 2021-09-26 | Stop reason: HOSPADM

## 2021-09-22 RX ORDER — ONDANSETRON 2 MG/ML
4 INJECTION INTRAMUSCULAR; INTRAVENOUS ONCE
Status: COMPLETED | OUTPATIENT
Start: 2021-09-22 | End: 2021-09-22

## 2021-09-22 RX ORDER — ATORVASTATIN CALCIUM 20 MG/1
20 TABLET, FILM COATED ORAL EVERY EVENING
Status: DISCONTINUED | OUTPATIENT
Start: 2021-09-22 | End: 2021-09-26 | Stop reason: HOSPADM

## 2021-09-22 RX ADMIN — ACETAMINOPHEN 650 MG: 325 TABLET, FILM COATED ORAL at 17:26

## 2021-09-22 RX ADMIN — INSULIN HUMAN 1 UNITS: 100 INJECTION, SOLUTION PARENTERAL at 17:32

## 2021-09-22 RX ADMIN — INFLUENZA A VIRUS A/VICTORIA/2570/2019 IVR-215 (H1N1) ANTIGEN (FORMALDEHYDE INACTIVATED), INFLUENZA A VIRUS A/TASMANIA/503/2020 IVR-221 (H3N2) ANTIGEN (FORMALDEHYDE INACTIVATED), INFLUENZA B VIRUS B/PHUKET/3073/2013 ANTIGEN (FORMALDEHYDE INACTIVATED), AND INFLUENZA B VIRUS B/WASHINGTON/02/2019 ANTIGEN (FORMALDEHYDE INACTIVATED) 0.5 ML: 15; 15; 15; 15 INJECTION, SUSPENSION INTRAMUSCULAR at 17:24

## 2021-09-22 RX ADMIN — MORPHINE SULFATE 4 MG: 4 INJECTION INTRAVENOUS at 12:57

## 2021-09-22 RX ADMIN — OXYCODONE HYDROCHLORIDE 10 MG: 10 TABLET ORAL at 16:35

## 2021-09-22 RX ADMIN — SODIUM CHLORIDE: 9 INJECTION, SOLUTION INTRAVENOUS at 16:36

## 2021-09-22 RX ADMIN — SODIUM CHLORIDE 1000 ML: 9 INJECTION, SOLUTION INTRAVENOUS at 12:57

## 2021-09-22 RX ADMIN — RIVAROXABAN 15 MG: 15 TABLET, FILM COATED ORAL at 20:06

## 2021-09-22 RX ADMIN — ONDANSETRON 4 MG: 2 INJECTION INTRAMUSCULAR; INTRAVENOUS at 12:57

## 2021-09-22 RX ADMIN — IOHEXOL 100 ML: 350 INJECTION, SOLUTION INTRAVENOUS at 13:36

## 2021-09-22 RX ADMIN — SODIUM CHLORIDE, POTASSIUM CHLORIDE, SODIUM LACTATE AND CALCIUM CHLORIDE 1000 ML: 600; 310; 30; 20 INJECTION, SOLUTION INTRAVENOUS at 14:12

## 2021-09-22 RX ADMIN — OXYCODONE HYDROCHLORIDE 10 MG: 10 TABLET ORAL at 20:07

## 2021-09-22 RX ADMIN — INSULIN HUMAN 2 UNITS: 100 INJECTION, SOLUTION PARENTERAL at 20:08

## 2021-09-22 RX ADMIN — ENOXAPARIN SODIUM 120 MG: 120 INJECTION SUBCUTANEOUS at 14:29

## 2021-09-22 RX ADMIN — ATORVASTATIN CALCIUM 20 MG: 20 TABLET, FILM COATED ORAL at 20:06

## 2021-09-22 RX ADMIN — MORPHINE SULFATE 4 MG: 4 INJECTION INTRAVENOUS at 14:11

## 2021-09-22 ASSESSMENT — COGNITIVE AND FUNCTIONAL STATUS - GENERAL
TURNING FROM BACK TO SIDE WHILE IN FLAT BAD: A LITTLE
TOILETING: A LITTLE
WALKING IN HOSPITAL ROOM: A LITTLE
SUGGESTED CMS G CODE MODIFIER DAILY ACTIVITY: CK
PERSONAL GROOMING: A LITTLE
DAILY ACTIVITIY SCORE: 18
TOILETING: A LITTLE
STANDING UP FROM CHAIR USING ARMS: A LITTLE
EATING MEALS: A LITTLE
DRESSING REGULAR LOWER BODY CLOTHING: A LITTLE
SUGGESTED CMS G CODE MODIFIER DAILY ACTIVITY: CK
PERSONAL GROOMING: A LITTLE
EATING MEALS: A LITTLE
DRESSING REGULAR UPPER BODY CLOTHING: A LITTLE
MOVING TO AND FROM BED TO CHAIR: A LITTLE
DRESSING REGULAR LOWER BODY CLOTHING: A LITTLE
DRESSING REGULAR UPPER BODY CLOTHING: A LITTLE
MOVING FROM LYING ON BACK TO SITTING ON SIDE OF FLAT BED: A LITTLE
HELP NEEDED FOR BATHING: A LITTLE
HELP NEEDED FOR BATHING: A LITTLE
MOBILITY SCORE: 18
MOVING FROM LYING ON BACK TO SITTING ON SIDE OF FLAT BED: A LITTLE
MOVING TO AND FROM BED TO CHAIR: A LITTLE
STANDING UP FROM CHAIR USING ARMS: A LITTLE
CLIMB 3 TO 5 STEPS WITH RAILING: A LITTLE
DAILY ACTIVITIY SCORE: 18
CLIMB 3 TO 5 STEPS WITH RAILING: A LITTLE
SUGGESTED CMS G CODE MODIFIER MOBILITY: CK
WALKING IN HOSPITAL ROOM: A LITTLE
TURNING FROM BACK TO SIDE WHILE IN FLAT BAD: A LITTLE
MOBILITY SCORE: 18
SUGGESTED CMS G CODE MODIFIER MOBILITY: CK

## 2021-09-22 ASSESSMENT — LIFESTYLE VARIABLES
ON A TYPICAL DAY WHEN YOU DRINK ALCOHOL HOW MANY DRINKS DO YOU HAVE: 2
ALCOHOL_USE: YES
HAVE PEOPLE ANNOYED YOU BY CRITICIZING YOUR DRINKING: NO
EVER FELT BAD OR GUILTY ABOUT YOUR DRINKING: NO
CONSUMPTION TOTAL: NEGATIVE
TOTAL SCORE: 0
HAVE YOU EVER FELT YOU SHOULD CUT DOWN ON YOUR DRINKING: NO
DO YOU DRINK ALCOHOL: YES
TOTAL SCORE: 0
AVERAGE NUMBER OF DAYS PER WEEK YOU HAVE A DRINK CONTAINING ALCOHOL: 0
HOW MANY TIMES IN THE PAST YEAR HAVE YOU HAD 5 OR MORE DRINKS IN A DAY: 0
HAVE YOU EVER FELT YOU SHOULD CUT DOWN ON YOUR DRINKING: NO
TOTAL SCORE: 0
EVER HAD A DRINK FIRST THING IN THE MORNING TO STEADY YOUR NERVES TO GET RID OF A HANGOVER: NO

## 2021-09-22 ASSESSMENT — PATIENT HEALTH QUESTIONNAIRE - PHQ9
2. FEELING DOWN, DEPRESSED, IRRITABLE, OR HOPELESS: NOT AT ALL
SUM OF ALL RESPONSES TO PHQ9 QUESTIONS 1 AND 2: 0
1. LITTLE INTEREST OR PLEASURE IN DOING THINGS: NOT AT ALL

## 2021-09-22 ASSESSMENT — FIBROSIS 4 INDEX: FIB4 SCORE: 1.56

## 2021-09-22 ASSESSMENT — ENCOUNTER SYMPTOMS: ABDOMINAL PAIN: 1

## 2021-09-22 ASSESSMENT — PAIN DESCRIPTION - PAIN TYPE: TYPE: ACUTE PAIN;CHRONIC PAIN

## 2021-09-22 NOTE — ED NOTES
Med rec updated and complete  Allergies reviewed  Pt reports that he just started taking GABAPENTIN 300MG 2 capsules a day for the last 2 days.  Pt reports no vitamins   Pt reports no antibiotics in the last 30 days.    No current facility-administered medications on file prior to encounter.     Current Outpatient Medications on File Prior to Encounter   Medication Sig Dispense Refill   • aspirin EC (ECOTRIN) 325 MG Tablet Delayed Response Take 975 mg by mouth 2 times a day as needed (For pain).     • Ibuprofen-diphenhydrAMINE Cit (IBUPROFEN PM PO) Take 2 Tablets by mouth at bedtime as needed (For pain and sleep).     • gabapentin (NEURONTIN) 300 MG Cap Take 600 mg by mouth every day.

## 2021-09-22 NOTE — ASSESSMENT & PLAN NOTE
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    Admitting physician spoke with vascular surgery, Dr. Praneeth Hutson, no indication for surgical intervention, patient to continue with anticoagulation  Currently on Xarelto

## 2021-09-22 NOTE — ED PROVIDER NOTES
"ED Provider Note    CHIEF COMPLAINT  Chief Complaint   Patient presents with   • Abdominal Pain     Reports gen abd pain. Denies bloody stools, V/D or fevers. Reports LNBM yesterday.   • Urinary Retention     \"I can't urinate\". Reports last time 0300 this morning.         HPI  Ajit Gregory is a 60 y.o. male who presents to the ED with complaints of abdominal pain.  The patient states he started having just generalized abdominal pain and through this morning he just been unable to urinate.  Patient denies any overt fevers chills describes severe lower abdominal pain and is unable to urinate.  Patient is here for evaluation.  She describes the pain as a 6-8 over 10 type discomfort.    REVIEW OF SYSTEMS  See HPI for further details. All other systems are negative.     PAST MEDICAL HISTORY  Past Medical History:   Diagnosis Date   • Diabetes ([facility])    • DVT (deep venous thrombosis) ([facility])        FAMILY HISTORY  Family History   Problem Relation Age of Onset   • No Known Problems Mother    • No Known Problems Father      Patient's family history has been discussed and is been found to be noncontributory to his present illness  SOCIAL HISTORY  Social History     Socioeconomic History   • Marital status:      Spouse name: Not on file   • Number of children: Not on file   • Years of education: Not on file   • Highest education level: Not on file   Occupational History   • Not on file   Tobacco Use   • Smoking status: Current Every Day Smoker     Types: Cigars   • Smokeless tobacco: Never Used   Vaping Use   • Vaping Use: Never used   Substance and Sexual Activity   • Alcohol use: Not Currently   • Drug use: No   • Sexual activity: Not on file   Other Topics Concern   • Not on file   Social History Narrative   • Not on file     Social Determinants of Health     Financial Resource Strain:    • Difficulty of Paying Living Expenses:    Food Insecurity:    • Worried About Running Out of Food in the Last Year:    • " "Ran Out of Food in the Last Year:    Transportation Needs:    • Lack of Transportation (Medical):    • Lack of Transportation (Non-Medical):    Physical Activity:    • Days of Exercise per Week:    • Minutes of Exercise per Session:    Stress:    • Feeling of Stress :    Social Connections:    • Frequency of Communication with Friends and Family:    • Frequency of Social Gatherings with Friends and Family:    • Attends Religion Services:    • Active Member of Clubs or Organizations:    • Attends Club or Organization Meetings:    • Marital Status:    Intimate Partner Violence:    • Fear of Current or Ex-Partner:    • Emotionally Abused:    • Physically Abused:    • Sexually Abused:       Ron Edouard M.D.        SURGICAL HISTORY  History reviewed. No pertinent surgical history.    CURRENT MEDICATIONS  Home Medications     Reviewed by Lamin Child (Pharmacy Tech) on 09/22/21 at 1150  Med List Status: Complete   Medication Last Dose Status   aspirin EC (ECOTRIN) 325 MG Tablet Delayed Response 9/21/2021 Active   gabapentin (NEURONTIN) 300 MG Cap 9/22/2021 Active   Ibuprofen-diphenhydrAMINE Cit (IBUPROFEN PM PO) 9/21/2021 Active                ALLERGIES  No Known Allergies    PHYSICAL EXAM  VITAL SIGNS: /89   Pulse 100   Temp 37.2 °C (98.9 °F) (Oral)   Resp 17   Ht 1.93 m (6' 4\")   Wt 113 kg (250 lb)   SpO2 95%   BMI 30.43 kg/m²    Pulse Oximetry was obtained. It showed a reading of Pulse Oximetry: 95 %.  I interpreted this as nonhypoxic.     Constitutional: Peers to be uncomfortable but otherwise well developed, Well nourished, No acute distress, Non-toxic appearance.   HENT: Normocephalic, Atraumatic, Bilateral external ears normal, bilateral tympanic membranes normal, Oropharynx dry mucous membranes, No oral exudates, Nose normal.   Eyes: Pupils are equal round and react to light, extraocular motions are intact, conjunctiva is normal, there are no signs of exudate.   Neck: Supple, no " cervical lymphadenopathy, no meningeal signs..   Lymphatic: No lymphadenopathy noted.   Cardiovascular: Regular rate and rhythm without murmurs gallops or rubs.   Thorax & Lungs: Lungs are clear to auscultation bilaterally, there are no wheezes no rales. Chest wall is nontender.  Abdomen: Soft diffusely tender in the lower abdominal region bedside ultrasound does show a very large bladder.  No rebound tenderness  Skin: Warm, Dry, No erythema,   Back: No tenderness, No CVA tenderness.   Extremities: Intact distal pulses, no clubbing, no cyanosis, no edema, nontender.  Neurologic: Alert & oriented x 3, Normal motor function, Normal sensory function, No focal deficits noted.         RADIOLOGY/PROCEDURES  CT-ABDOMEN-PELVIS WITH   Final Result      1.  Partial thrombosis of the superior mesenteric vein with associated mesenteric edema and bowel wall thickening involving the small bowel in the midabdomen and right lower quadrant.      2.  No evidence of bowel infarction.      3.  No colonic obstruction or inflammation.      Findings were discussed with RHETT HUBER M.D. on 9/22/2021 1:53 PM.            Results for orders placed or performed during the hospital encounter of 09/22/21   CBC WITH DIFFERENTIAL   Result Value Ref Range    WBC 12.7 (H) 4.8 - 10.8 K/uL    RBC 5.90 4.70 - 6.10 M/uL    Hemoglobin 18.3 (H) 14.0 - 18.0 g/dL    Hematocrit 54.5 (H) 42.0 - 52.0 %    MCV 92.4 81.4 - 97.8 fL    MCH 31.0 27.0 - 33.0 pg    MCHC 33.6 (L) 33.7 - 35.3 g/dL    RDW 54.0 (H) 35.9 - 50.0 fL    Platelet Count 159 (L) 164 - 446 K/uL    MPV 9.8 9.0 - 12.9 fL    Neutrophils-Polys 88.70 (H) 44.00 - 72.00 %    Lymphocytes 5.30 (L) 22.00 - 41.00 %    Monocytes 5.20 0.00 - 13.40 %    Eosinophils 0.00 0.00 - 6.90 %    Basophils 0.20 0.00 - 1.80 %    Immature Granulocytes 0.60 0.00 - 0.90 %    Nucleated RBC 0.00 /100 WBC    Neutrophils (Absolute) 11.30 (H) 1.82 - 7.42 K/uL    Lymphs (Absolute) 0.67 (L) 1.00 - 4.80 K/uL    Monos  (Absolute) 0.66 0.00 - 0.85 K/uL    Eos (Absolute) 0.00 0.00 - 0.51 K/uL    Baso (Absolute) 0.03 0.00 - 0.12 K/uL    Immature Granulocytes (abs) 0.08 0.00 - 0.11 K/uL    NRBC (Absolute) 0.00 K/uL   COMP METABOLIC PANEL   Result Value Ref Range    Sodium 133 (L) 135 - 145 mmol/L    Potassium 4.2 3.6 - 5.5 mmol/L    Chloride 100 96 - 112 mmol/L    Co2 17 (L) 20 - 33 mmol/L    Anion Gap 16.0 7.0 - 16.0    Glucose 222 (H) 65 - 99 mg/dL    Bun 10 8 - 22 mg/dL    Creatinine 0.97 0.50 - 1.40 mg/dL    Calcium 9.1 8.4 - 10.2 mg/dL    AST(SGOT) 17 12 - 45 U/L    ALT(SGPT) 18 2 - 50 U/L    Alkaline Phosphatase 159 (H) 30 - 99 U/L    Total Bilirubin 0.7 0.1 - 1.5 mg/dL    Albumin 3.9 3.2 - 4.9 g/dL    Total Protein 7.3 6.0 - 8.2 g/dL    Globulin 3.4 1.9 - 3.5 g/dL    A-G Ratio 1.1 g/dL   LIPASE   Result Value Ref Range    Lipase 18 7 - 58 U/L   URINALYSIS    Specimen: Urine   Result Value Ref Range    Color Dark Yellow     Character Hazy (A)     Specific Gravity >=1.030 <1.035    Ph 5.0 5.0 - 8.0    Glucose 250 (A) Negative mg/dL    Ketones Trace (A) Negative mg/dL    Protein Negative Negative mg/dL    Bilirubin Negative Negative    Nitrite Negative Negative    Leukocyte Esterase Negative Negative    Occult Blood Negative Negative    Micro Urine Req Microscopic    ESTIMATED GFR   Result Value Ref Range    GFR If African American >60 >60 mL/min/1.73 m 2    GFR If Non African American >60 >60 mL/min/1.73 m 2   URINE MICROSCOPIC (W/UA)   Result Value Ref Range    WBC Rare (A) /hpf    RBC 1-3 /hpf    Bacteria Rare (A) None /hpf    Epithelial Cells Rare Few /hpf    Mucous Threads Moderate /hpf    Urine Crystals Rare Amorphous /hpf   LACTIC ACID   Result Value Ref Range    Lactic Acid 2.8 (H) 0.5 - 2.0 mmol/L   PT/INR   Result Value Ref Range    PT 11.9 (L) 12.0 - 14.6 sec    INR 0.95 0.87 - 1.13   PTT   Result Value Ref Range    APTT 30.4 24.7 - 36.0 sec   PROCALCITONIN   Result Value Ref Range    Procalcitonin 0.09 <0.25 ng/mL    Lipid Profile   Result Value Ref Range    Cholesterol,Tot 154 100 - 199 mg/dL    Triglycerides 238 (H) 0 - 149 mg/dL    HDL 28 (A) >=40 mg/dL    LDL 78 <100 mg/dL   EKG (NOW)   Result Value Ref Range    Report       [facility] Emergency Dept.    Test Date:  2021  Pt Name:    NELI MONTALVO                Department: EDS  MRN:        [id number]                      [facility]  Gender:     Male                         Technician: [id number]  :        1960                   Requested By:RHETT HUBER  Order #:    [id number]                    Reading MD: RHETT HUBER MD    Measurements  Intervals                                Axis  Rate:       84                           P:          43  SC:         161                          QRS:        82  QRSD:       98                           T:          51  QT:         379  QTc:        449    Interpretive Statements  Sinus rhythm  Minimal ST depression, anterolateral leads  No previous ECG available for comparison  Electronically Signed On 2021 13:59:38 PDT by RHETT HUBER MD     POCT glucose device results   Result Value Ref Range    Glucose - Accu-Ck 188 (H) 65 - 99 mg/dL         COURSE & MEDICAL DECISION MAKING  Pertinent Labs & Imaging studies reviewed. (See chart for details)  Patient presents for evaluation.  Initially he was just unable to urinate at bedside ultrasound does show urinary retention.  After Alicia catheter was placed the patient is still complaining of severe pain within the lower abdominal region so I did initiate a work-up with a CT scan and laboratory studies which shows the superior mesenteric vein thrombosis.  Patient was given several doses of morphine still having quite a bit of discomfort at this point we will start the patient on Lovenox and we will admit the patient for further treatment and care.  Have spoken to the hospitalist for this admission.  I did give the patient fluids  because of the concern of ischemic bowel because of a slightly elevated lactic acid of 2.8.    FINAL IMPRESSION  1. Superior mesenteric vein thrombosis (HCC)     2. Abdominal pain, unspecified abdominal location               Electronically signed by: Masoud Sepulveda M.D., 9/22/2021 12:22 PM

## 2021-09-22 NOTE — H&P
"Hospital Medicine History & Physical Note    Date of Service  9/22/2021    Primary Care Physician  Ron Edouard M.D.    Consultants  None    Specialist Names: N/A    Code Status  Full Code    Chief Complaint  Chief Complaint   Patient presents with   • Abdominal Pain     Reports gen abd pain. Denies bloody stools, V/D or fevers. Reports LNBM yesterday.   • Urinary Retention     \"I can't urinate\". Reports last time 0300 this morning.        History of Presenting Illness  This is a 60 year old male with PMHx of left LUE DVT (formerly on Eliquis) and tobacco use disorder who was admitted on 09/22/2021 due to severe abdominal pain.    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     Spoke with vascular surgery, Dr. Praneeth Hutson, no indication for intervention, patient to continue with anticoagulation, currently on therapeutic lovenox.Xarelto and Eliquis sent to pharmacy, which ever is covered, is what the patient will be discharged with.     I discussed the plan of care with patient, bedside RN, charge RN,  and pharmacy.    Review of Systems  Review of Systems   Gastrointestinal: Positive for abdominal pain.   All other systems reviewed and are negative.      Past Medical History   has a past medical history of Diabetes (HCC) and DVT (deep venous thrombosis) ([facility]).    Surgical History   has no past surgical history on file.     Family History  family history includes No Known Problems in his father and mother.   Family history reviewed with patient. There is no family history that is pertinent to the chief complaint.     Social History   reports that he has been smoking cigars. He has never used smokeless tobacco. He reports previous alcohol use. He reports that he does not use drugs.    Allergies  No Known Allergies    Medications  Prior to " Admission Medications   Prescriptions Last Dose Informant Patient Reported? Taking?   Ibuprofen-diphenhydrAMINE Cit (IBUPROFEN PM PO) 9/21/2021 at 2200 Patient Yes Yes   Sig: Take 2 Tablets by mouth at bedtime as needed (For pain and sleep).   aspirin EC (ECOTRIN) 325 MG Tablet Delayed Response 9/21/2021 at 2200 Patient Yes Yes   Sig: Take 975 mg by mouth 2 times a day as needed (For pain).   gabapentin (NEURONTIN) 300 MG Cap 9/22/2021 at 0700 Patient Yes Yes   Sig: Take 600 mg by mouth every day.      Facility-Administered Medications: None       Physical Exam  Temp:  [36 °C (96.8 °F)-36.2 °C (97.1 °F)] 36 °C (96.8 °F)  Pulse:  [89-92] 89  Resp:  [18-20] 18  BP: (152-173)/(93-94) 152/94  SpO2:  [93 %-95 %] 93 %  Blood Pressure: 152/94   Temperature: 36 °C (96.8 °F)   Pulse: 89   Respiration: 18   Pulse Oximetry: 93 %       Physical Exam  Vitals and nursing note reviewed.   Constitutional:       General: He is not in acute distress.     Appearance: Normal appearance.   HENT:      Head: Normocephalic and atraumatic.   Eyes:      Extraocular Movements: Extraocular movements intact.      Conjunctiva/sclera: Conjunctivae normal.      Pupils: Pupils are equal, round, and reactive to light.   Cardiovascular:      Rate and Rhythm: Normal rate and regular rhythm.      Pulses: Normal pulses.      Heart sounds: No murmur heard.   No friction rub. No gallop.    Pulmonary:      Effort: Pulmonary effort is normal. No respiratory distress.      Breath sounds: Normal breath sounds. No wheezing, rhonchi or rales.   Abdominal:      General: Bowel sounds are normal. There is no distension.      Palpations: Abdomen is soft.      Tenderness: There is abdominal tenderness.   Genitourinary:     Comments: Alicia in place with yellow urine, no hematuria noted  Musculoskeletal:         General: No swelling or tenderness. Normal range of motion.      Cervical back: Normal range of motion and neck supple. No muscular tenderness.      Right  lower leg: No edema.      Left lower leg: No edema.   Skin:     General: Skin is warm and dry.      Capillary Refill: Capillary refill takes less than 2 seconds.      Findings: No bruising, erythema or rash.   Neurological:      General: No focal deficit present.      Mental Status: He is alert and oriented to person, place, and time.         Laboratory:  Recent Labs     09/22/21  1209   WBC 12.7*   RBC 5.90   HEMOGLOBIN 18.3*   HEMATOCRIT 54.5*   MCV 92.4   MCH 31.0   MCHC 33.6*   RDW 54.0*   PLATELETCT 159*   MPV 9.8     Recent Labs     09/22/21  1209   SODIUM 133*   POTASSIUM 4.2   CHLORIDE 100   CO2 17*   GLUCOSE 222*   BUN 10   CREATININE 0.97   CALCIUM 9.1     Recent Labs     09/22/21  1209   ALTSGPT 18   ASTSGOT 17   ALKPHOSPHAT 159*   TBILIRUBIN 0.7   LIPASE 18   GLUCOSE 222*     Recent Labs     09/22/21  1209   APTT 30.4   INR 0.95     No results for input(s): NTPROBNP in the last 72 hours.      No results for input(s): TROPONINT in the last 72 hours.    Imaging:  CT-ABDOMEN-PELVIS WITH   Final Result      1.  Partial thrombosis of the superior mesenteric vein with associated mesenteric edema and bowel wall thickening involving the small bowel in the midabdomen and right lower quadrant.      2.  No evidence of bowel infarction.      3.  No colonic obstruction or inflammation.      Findings were discussed with RHETT HUBER M.D. on 9/22/2021 1:53 PM.          EKG:  I have personally reviewed the images and compared with prior images.    Assessment/Plan:  I anticipate this patient is appropriate for observation status at this time.    * Superior mesenteric vein thrombosis (HCC)  Assessment & Plan  CTAP with IV noted partial thrombosis of the superior mesenteric vein with associated mesenteric edema and bowel wall thickening involving the small bowel in the midabdomen and right lower quadrant.    I spoke with vascular surgery, Dr. Praneeth Hutson, no indication for intervention, patient to continue with  anticoagulation  Currently on Therapeutic Lovenox   Xarelto and Eliquis sent to pharmacy, which ever is covered, is what the patient will be discharged with    Urinary retention  Assessment & Plan  Bladder scan noted patient retaining 500 cc  Alicia catheter was placed  Voiding trial     Tobacco use disorder  Assessment & Plan  Nicotine replacement protocol and cessation education provided for 12 minutes, discussed options of nicotine patch, acupuncture, medical treatment with wellbutrin and chantix. Discussed other options. Code 40506    Hyponatremia  Assessment & Plan  Mild   Continue to monitor    Thrombocytopathia (HCC)  Assessment & Plan  No active bleeding  Continue to monitor    VTE prophylaxis: SCDs/TEDs and therapeutic anticoagulation with body weight lovenox

## 2021-09-22 NOTE — ED NOTES
Pt amb to rm#[id number]. erp at bs and u/s proc compl confirming bladder content. Alicia catheter to dd immediately placed, 700ml output noted. UA sent to lab

## 2021-09-22 NOTE — DISCHARGE PLANNING
Anticipated Discharge Disposition: Unknown anticipate home    Action: Dr Mai pre planning DC meds .RX for eliquis and xarelto written. ER CM contacted Leon at [facility] . Both are ok on insurance. Neither need prior auth. Both have 0 dollar co pay per him. Updated Dr Mai.    Barriers to Discharge: None    Plan: Cont to follow

## 2021-09-22 NOTE — ASSESSMENT & PLAN NOTE
Nicotine replacement protocol and cessation education provided for 12 minutes, discussed options of nicotine patch, acupuncture, medical treatment with wellbutrin and chantix. Discussed other options. Code 87160

## 2021-09-23 PROBLEM — R10.9 ABDOMINAL PAIN: Status: ACTIVE | Noted: 2021-09-23

## 2021-09-23 PROBLEM — R11.0 NAUSEA: Status: ACTIVE | Noted: 2021-09-23

## 2021-09-23 PROBLEM — K59.00 CONSTIPATION: Status: ACTIVE | Noted: 2021-09-23

## 2021-09-23 LAB
ANION GAP SERPL CALC-SCNC: 16 MMOL/L (ref 7–16)
BUN SERPL-MCNC: 11 MG/DL (ref 8–22)
CALCIUM SERPL-MCNC: 8.7 MG/DL (ref 8.4–10.2)
CHLORIDE SERPL-SCNC: 96 MMOL/L (ref 96–112)
CO2 SERPL-SCNC: 19 MMOL/L (ref 20–33)
CREAT SERPL-MCNC: 0.84 MG/DL (ref 0.5–1.4)
ERYTHROCYTE [DISTWIDTH] IN BLOOD BY AUTOMATED COUNT: 55.6 FL (ref 35.9–50)
GLUCOSE BLD-MCNC: 155 MG/DL (ref 65–99)
GLUCOSE BLD-MCNC: 158 MG/DL (ref 65–99)
GLUCOSE BLD-MCNC: 159 MG/DL (ref 65–99)
GLUCOSE BLD-MCNC: 212 MG/DL (ref 65–99)
GLUCOSE SERPL-MCNC: 164 MG/DL (ref 65–99)
HCT VFR BLD AUTO: 51.3 % (ref 42–52)
HGB BLD-MCNC: 17.3 G/DL (ref 14–18)
MAGNESIUM SERPL-MCNC: 1.6 MG/DL (ref 1.5–2.5)
MCH RBC QN AUTO: 30.7 PG (ref 27–33)
MCHC RBC AUTO-ENTMCNC: 33.7 G/DL (ref 33.7–35.3)
MCV RBC AUTO: 91 FL (ref 81.4–97.8)
PLATELET # BLD AUTO: 164 K/UL (ref 164–446)
PMV BLD AUTO: 10.7 FL (ref 9–12.9)
POTASSIUM SERPL-SCNC: 4 MMOL/L (ref 3.6–5.5)
RBC # BLD AUTO: 5.64 M/UL (ref 4.7–6.1)
SODIUM SERPL-SCNC: 131 MMOL/L (ref 135–145)
WBC # BLD AUTO: 13.2 K/UL (ref 4.8–10.8)

## 2021-09-23 PROCEDURE — 80048 BASIC METABOLIC PNL TOTAL CA: CPT

## 2021-09-23 PROCEDURE — 700102 HCHG RX REV CODE 250 W/ 637 OVERRIDE(OP): Performed by: GENERAL PRACTICE

## 2021-09-23 PROCEDURE — 85027 COMPLETE CBC AUTOMATED: CPT

## 2021-09-23 PROCEDURE — 99226 PR SUBSEQUENT OBSERVATION CARE,LEVEL III: CPT | Performed by: INTERNAL MEDICINE

## 2021-09-23 PROCEDURE — 82962 GLUCOSE BLOOD TEST: CPT

## 2021-09-23 PROCEDURE — 83735 ASSAY OF MAGNESIUM: CPT

## 2021-09-23 PROCEDURE — G0378 HOSPITAL OBSERVATION PER HR: HCPCS

## 2021-09-23 PROCEDURE — A9270 NON-COVERED ITEM OR SERVICE: HCPCS | Performed by: INTERNAL MEDICINE

## 2021-09-23 PROCEDURE — 700105 HCHG RX REV CODE 258: Performed by: GENERAL PRACTICE

## 2021-09-23 PROCEDURE — 700102 HCHG RX REV CODE 250 W/ 637 OVERRIDE(OP): Performed by: INTERNAL MEDICINE

## 2021-09-23 PROCEDURE — A9270 NON-COVERED ITEM OR SERVICE: HCPCS | Performed by: GENERAL PRACTICE

## 2021-09-23 PROCEDURE — 700111 HCHG RX REV CODE 636 W/ 250 OVERRIDE (IP): Performed by: GENERAL PRACTICE

## 2021-09-23 PROCEDURE — 36415 COLL VENOUS BLD VENIPUNCTURE: CPT

## 2021-09-23 RX ORDER — POLYETHYLENE GLYCOL 3350 17 G/17G
1 POWDER, FOR SOLUTION ORAL
Status: DISCONTINUED | OUTPATIENT
Start: 2021-09-23 | End: 2021-09-26 | Stop reason: HOSPADM

## 2021-09-23 RX ORDER — TAMSULOSIN HYDROCHLORIDE 0.4 MG/1
0.4 CAPSULE ORAL
Status: DISCONTINUED | OUTPATIENT
Start: 2021-09-23 | End: 2021-09-26 | Stop reason: HOSPADM

## 2021-09-23 RX ORDER — AMOXICILLIN 250 MG
2 CAPSULE ORAL 2 TIMES DAILY
Status: DISCONTINUED | OUTPATIENT
Start: 2021-09-23 | End: 2021-09-26 | Stop reason: HOSPADM

## 2021-09-23 RX ORDER — BISACODYL 10 MG
10 SUPPOSITORY, RECTAL RECTAL
Status: DISCONTINUED | OUTPATIENT
Start: 2021-09-23 | End: 2021-09-26 | Stop reason: HOSPADM

## 2021-09-23 RX ADMIN — INSULIN HUMAN 1 UNITS: 100 INJECTION, SOLUTION PARENTERAL at 17:07

## 2021-09-23 RX ADMIN — RIVAROXABAN 15 MG: 15 TABLET, FILM COATED ORAL at 08:29

## 2021-09-23 RX ADMIN — ONDANSETRON 4 MG: 2 INJECTION INTRAMUSCULAR; INTRAVENOUS at 08:28

## 2021-09-23 RX ADMIN — OXYCODONE HYDROCHLORIDE 10 MG: 10 TABLET ORAL at 19:44

## 2021-09-23 RX ADMIN — OXYCODONE HYDROCHLORIDE 10 MG: 10 TABLET ORAL at 00:30

## 2021-09-23 RX ADMIN — OXYCODONE HYDROCHLORIDE 10 MG: 10 TABLET ORAL at 15:36

## 2021-09-23 RX ADMIN — ATORVASTATIN CALCIUM 20 MG: 20 TABLET, FILM COATED ORAL at 17:06

## 2021-09-23 RX ADMIN — OXYCODONE HYDROCHLORIDE 10 MG: 10 TABLET ORAL at 09:36

## 2021-09-23 RX ADMIN — INSULIN HUMAN 1 UNITS: 100 INJECTION, SOLUTION PARENTERAL at 20:34

## 2021-09-23 RX ADMIN — TAMSULOSIN HYDROCHLORIDE 0.4 MG: 0.4 CAPSULE ORAL at 08:29

## 2021-09-23 RX ADMIN — INSULIN HUMAN 2 UNITS: 100 INJECTION, SOLUTION PARENTERAL at 06:13

## 2021-09-23 RX ADMIN — SODIUM CHLORIDE: 9 INJECTION, SOLUTION INTRAVENOUS at 15:37

## 2021-09-23 RX ADMIN — SENNOSIDES AND DOCUSATE SODIUM 2 TABLET: 50; 8.6 TABLET ORAL at 17:11

## 2021-09-23 RX ADMIN — OXYCODONE HYDROCHLORIDE 10 MG: 10 TABLET ORAL at 06:08

## 2021-09-23 RX ADMIN — RIVAROXABAN 15 MG: 15 TABLET, FILM COATED ORAL at 17:06

## 2021-09-23 RX ADMIN — POLYETHYLENE GLYCOL 3350 1 PACKET: 17 POWDER, FOR SOLUTION ORAL at 15:36

## 2021-09-23 ASSESSMENT — ENCOUNTER SYMPTOMS
NAUSEA: 1
BLURRED VISION: 0
FEVER: 0
DIZZINESS: 0
SHORTNESS OF BREATH: 0
CHILLS: 0
CLAUDICATION: 0
COUGH: 0
DIARRHEA: 0
DEPRESSION: 0
INSOMNIA: 0
CONSTIPATION: 1
SENSORY CHANGE: 0
VOMITING: 1
PHOTOPHOBIA: 0
HEARTBURN: 0
HEADACHES: 0
NERVOUS/ANXIOUS: 0
ABDOMINAL PAIN: 1
MYALGIAS: 0
WEAKNESS: 0
SPEECH CHANGE: 0

## 2021-09-23 ASSESSMENT — PAIN DESCRIPTION - PAIN TYPE
TYPE: ACUTE PAIN

## 2021-09-23 NOTE — PROGRESS NOTES
"Assumed care of patient. Bedside report received from night shift RN. Patient updated on plan of care. Patient has c/o abdominal and back pain. Patient states \"I think I need to go to the bathroom\" and states last bm was 2 days ago. Pain medication can be administered at 0906. Patient verbalized understanding. Patient vomited and emesis measures 250 mL, brown in color.    Patient instructed to call for assistance before getting out of bed. Call light is within reach and fall precautions are in place. All needs met at this time. Hourly rounding in place.     Covid19 surge in effect.  "

## 2021-09-23 NOTE — HOSPITAL COURSE
This is a 60 year old male with PMHx of left LUE DVT (formerly on Eliquis) and tobacco use disorder who was admitted on 09/22/2021 due to severe abdominal pain. He had CTAP with IV contrast and it was noted partial thrombosis of the superior mesenteric vein with associated mesenteric edema and bowel wall thickening involving the small bowel in the midabdomen and right lower quadrant.   Admitting physician spoke with vascular surgery, Dr. Praneeth Hutson, no indication for intervention, patient to continue with anticoagulation, was transitioned from therapeutic lovenox to Xarelto.

## 2021-09-23 NOTE — ASSESSMENT & PLAN NOTE
Bowel protocol,KUB shows no evidence of obstruction  Enema given without result  Po/pr dulcolax given without result  Relistor given without result  Magnesium citrate now

## 2021-09-23 NOTE — PROGRESS NOTES
This RN removed indwelling terrazas catheter at approximately 1400. There was approximately 1300 mL of urine in the bag at time of removal. Patient has voided 100 mL since removal.

## 2021-09-23 NOTE — PROGRESS NOTES
Hospital Medicine Daily Progress Note    Date of Service  9/23/2021    Chief Complaint  Ajit Gregory is a 60 y.o. male admitted 9/22/2021 with abdominal pain and urinary retention.    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 He had CTAP with IV contrast and it was noted partial thrombosis of the superior mesenteric vein with associated mesenteric edema and bowel wall thickening involving the small bowel in the midabdomen and right lower quadrant.   Admitting physician spoke with vascular surgery, Dr. Praneeth Hutson, no indication for intervention, patient to continue with anticoagulation, was transitioned from therapeutic lovenox to Xarelto.       Interval Problem Update  9/23 Patient feeling anxious this am as he feels he needs to have a bowel movement and had episode of nausea and vomiting this am.  Terrazas was placed on admission due to inability to void.  Will dc terrazas in attempt of voiding trial and leave out if able to urinate.  Xarelto covered by insurance and once his symptoms are better controlled with abdominal pain and he is able to tolerate PO intake, will dc home at that time.    I have personally seen and examined the patient at bedside. I discussed the plan of care with patient, bedside RN, charge RN,  and pharmacy.    Consultants/Specialty  none    Code Status  Full Code    Disposition  Patient is not medically cleared.   Anticipate discharge to to home with close outpatient follow-up.  I have placed the appropriate orders for post-discharge needs.    Review of Systems  Review of Systems   Constitutional: Negative for chills and fever.   HENT: Negative for congestion.    Eyes: Negative for blurred vision and photophobia.   Respiratory: Negative for cough and shortness of breath.    Cardiovascular: Negative for chest pain, claudication and leg  swelling.   Gastrointestinal: Positive for abdominal pain, constipation, nausea and vomiting. Negative for diarrhea and heartburn.   Genitourinary: Positive for urgency. Negative for dysuria and hematuria.   Musculoskeletal: Negative for joint pain and myalgias.   Skin: Negative for itching and rash.   Neurological: Negative for dizziness, sensory change, speech change, weakness and headaches.   Psychiatric/Behavioral: Negative for depression. The patient is not nervous/anxious and does not have insomnia.         Physical Exam  Temp:  [35.5 °C (95.9 °F)-37.2 °C (98.9 °F)] 36.7 °C (98 °F)  Pulse:  [] 92  Resp:  [17-20] 18  BP: (132-173)/(61-94) 164/72  SpO2:  [90 %-95 %] 91 %    Physical Exam  Vitals and nursing note reviewed.   Constitutional:       General: He is not in acute distress.     Appearance: Normal appearance.   HENT:      Head: Normocephalic and atraumatic.   Eyes:      General: No scleral icterus.     Extraocular Movements: Extraocular movements intact.   Cardiovascular:      Rate and Rhythm: Normal rate and regular rhythm.      Pulses: Normal pulses.      Heart sounds: Normal heart sounds. No murmur heard.     Pulmonary:      Effort: Pulmonary effort is normal. No respiratory distress.      Breath sounds: Normal breath sounds. No wheezing, rhonchi or rales.   Abdominal:      General: Abdomen is flat. Bowel sounds are normal. There is no distension.      Palpations: Abdomen is soft.      Tenderness: There is no rebound.   Musculoskeletal:         General: No swelling or tenderness.      Cervical back: Normal range of motion and neck supple.   Lymphadenopathy:      Cervical: No cervical adenopathy.   Skin:     Coloration: Skin is not jaundiced.      Findings: No erythema.   Neurological:      General: No focal deficit present.      Mental Status: He is alert and oriented to person, place, and time. Mental status is at baseline.      Cranial Nerves: No cranial nerve deficit.   Psychiatric:          Mood and Affect: Mood normal.         Behavior: Behavior normal.         Fluids    Intake/Output Summary (Last 24 hours) at 9/23/2021 1019  Last data filed at 9/23/2021 0800  Gross per 24 hour   Intake 1220 ml   Output 1450 ml   Net -230 ml       Laboratory  Recent Labs     09/22/21  1209 09/23/21  0139   WBC 12.7* 13.2*   RBC 5.90 5.64   HEMOGLOBIN 18.3* 17.3   HEMATOCRIT 54.5* 51.3   MCV 92.4 91.0   MCH 31.0 30.7   MCHC 33.6* 33.7   RDW 54.0* 55.6*   PLATELETCT 159* 164   MPV 9.8 10.7     Recent Labs     09/22/21  1209 09/23/21  0139   SODIUM 133* 131*   POTASSIUM 4.2 4.0   CHLORIDE 100 96   CO2 17* 19*   GLUCOSE 222* 164*   BUN 10 11   CREATININE 0.97 0.84   CALCIUM 9.1 8.7     Recent Labs     09/22/21  1209   APTT 30.4   INR 0.95         Recent Labs     09/22/21  1209   TRIGLYCERIDE 238*   HDL 28*   LDL 78       Imaging  CT-ABDOMEN-PELVIS WITH   Final Result      1.  Partial thrombosis of the superior mesenteric vein with associated mesenteric edema and bowel wall thickening involving the small bowel in the midabdomen and right lower quadrant.      2.  No evidence of bowel infarction.      3.  No colonic obstruction or inflammation.      Findings were discussed with RHETT HUBER M.D. on 9/22/2021 1:53 PM.           Assessment/Plan  * Superior mesenteric vein thrombosis (HCC)  Assessment & Plan  CTAP with IV noted partial thrombosis of the superior mesenteric vein with associated mesenteric edema and bowel wall thickening involving the small bowel in the midabdomen and right lower quadrant.    Admitting physician spoke with vascular surgery, Dr. Praneeth Hutson, no indication for surgical intervention, patient to continue with anticoagulation  Currently on Xarelto      Urinary retention  Assessment & Plan  Bladder scan noted patient retaining 500 cc  Terrazas catheter was placed  Start flomax  Voiding trial and if not successful, terrazas to be replaced with urology follow up.      Constipation  Assessment &  Plan  Bowel protocol      Abdominal pain  Assessment & Plan  Secondary to SMV thrombus  Symptomatic treatment    Nausea  Assessment & Plan  Secondary to SMV thrombus  Symptomatic treatment      Tobacco use disorder  Assessment & Plan  Nicotine replacement protocol and cessation education provided for 12 minutes, discussed options of nicotine patch, acupuncture, medical treatment with wellbutrin and chantix. Discussed other options. Code 21968    Hyponatremia  Assessment & Plan  Mild   Continue to monitor    Thrombocytopathia (HCC)  Assessment & Plan  No active bleeding  Continue to monitor       VTE prophylaxis: therapeutic anticoagulation with Xarelto    I have performed a physical exam and reviewed and updated ROS and Plan today (9/23/2021). In review of yesterday's note (9/22/2021), there are no changes except as documented above.

## 2021-09-23 NOTE — PROGRESS NOTES
Patient admitted to floor from ED. Admission assessment an profile completed. Patient with pain complaints of right lower quadrant of abdomen. Alicia catheter in place with clear yellow urine noted. Patient call bell at side. Patient medicated for pain complaints with oxycodone (see eMAR). Patient advised to call for needs as they arise.

## 2021-09-24 ENCOUNTER — APPOINTMENT (OUTPATIENT)
Dept: RADIOLOGY | Facility: MEDICAL CENTER | Age: 61
End: 2021-09-24
Attending: STUDENT IN AN ORGANIZED HEALTH CARE EDUCATION/TRAINING PROGRAM
Payer: MEDICAID

## 2021-09-24 LAB
ALBUMIN SERPL BCP-MCNC: 3.5 G/DL (ref 3.2–4.9)
ALBUMIN/GLOB SERPL: 1.3 G/DL
ALP SERPL-CCNC: 120 U/L (ref 30–99)
ALT SERPL-CCNC: 10 U/L (ref 2–50)
ANION GAP SERPL CALC-SCNC: 9 MMOL/L (ref 7–16)
AST SERPL-CCNC: 12 U/L (ref 12–45)
BILIRUB SERPL-MCNC: 1 MG/DL (ref 0.1–1.5)
BUN SERPL-MCNC: 11 MG/DL (ref 8–22)
CALCIUM SERPL-MCNC: 8.5 MG/DL (ref 8.4–10.2)
CHLORIDE SERPL-SCNC: 100 MMOL/L (ref 96–112)
CO2 SERPL-SCNC: 23 MMOL/L (ref 20–33)
CREAT SERPL-MCNC: 0.92 MG/DL (ref 0.5–1.4)
ERYTHROCYTE [DISTWIDTH] IN BLOOD BY AUTOMATED COUNT: 55.5 FL (ref 35.9–50)
GLOBULIN SER CALC-MCNC: 2.8 G/DL (ref 1.9–3.5)
GLUCOSE BLD-MCNC: 149 MG/DL (ref 65–99)
GLUCOSE BLD-MCNC: 165 MG/DL (ref 65–99)
GLUCOSE BLD-MCNC: 177 MG/DL (ref 65–99)
GLUCOSE SERPL-MCNC: 138 MG/DL (ref 65–99)
HCT VFR BLD AUTO: 46.2 % (ref 42–52)
HGB BLD-MCNC: 15.4 G/DL (ref 14–18)
MCH RBC QN AUTO: 30.4 PG (ref 27–33)
MCHC RBC AUTO-ENTMCNC: 33.3 G/DL (ref 33.7–35.3)
MCV RBC AUTO: 91.1 FL (ref 81.4–97.8)
PLATELET # BLD AUTO: 146 K/UL (ref 164–446)
PMV BLD AUTO: 10.6 FL (ref 9–12.9)
POTASSIUM SERPL-SCNC: 4.3 MMOL/L (ref 3.6–5.5)
PROT SERPL-MCNC: 6.3 G/DL (ref 6–8.2)
RBC # BLD AUTO: 5.07 M/UL (ref 4.7–6.1)
SODIUM SERPL-SCNC: 132 MMOL/L (ref 135–145)
WBC # BLD AUTO: 11.2 K/UL (ref 4.8–10.8)

## 2021-09-24 PROCEDURE — G0378 HOSPITAL OBSERVATION PER HR: HCPCS

## 2021-09-24 PROCEDURE — 82962 GLUCOSE BLOOD TEST: CPT

## 2021-09-24 PROCEDURE — 51798 US URINE CAPACITY MEASURE: CPT

## 2021-09-24 PROCEDURE — A9270 NON-COVERED ITEM OR SERVICE: HCPCS | Performed by: GENERAL PRACTICE

## 2021-09-24 PROCEDURE — 80053 COMPREHEN METABOLIC PANEL: CPT

## 2021-09-24 PROCEDURE — 74018 RADEX ABDOMEN 1 VIEW: CPT

## 2021-09-24 PROCEDURE — 700102 HCHG RX REV CODE 250 W/ 637 OVERRIDE(OP): Performed by: GENERAL PRACTICE

## 2021-09-24 PROCEDURE — 700102 HCHG RX REV CODE 250 W/ 637 OVERRIDE(OP): Performed by: INTERNAL MEDICINE

## 2021-09-24 PROCEDURE — 700111 HCHG RX REV CODE 636 W/ 250 OVERRIDE (IP): Performed by: GENERAL PRACTICE

## 2021-09-24 PROCEDURE — 36415 COLL VENOUS BLD VENIPUNCTURE: CPT

## 2021-09-24 PROCEDURE — A9270 NON-COVERED ITEM OR SERVICE: HCPCS | Performed by: INTERNAL MEDICINE

## 2021-09-24 PROCEDURE — 85027 COMPLETE CBC AUTOMATED: CPT

## 2021-09-24 PROCEDURE — 99225 PR SUBSEQUENT OBSERVATION CARE,LEVEL II: CPT | Performed by: INTERNAL MEDICINE

## 2021-09-24 RX ORDER — BISACODYL 5 MG
15 TABLET, DELAYED RELEASE (ENTERIC COATED) ORAL ONCE
Status: COMPLETED | OUTPATIENT
Start: 2021-09-24 | End: 2021-09-24

## 2021-09-24 RX ORDER — TAMSULOSIN HYDROCHLORIDE 0.4 MG/1
0.4 CAPSULE ORAL
Qty: 30 CAPSULE | Refills: 2 | Status: SHIPPED | OUTPATIENT
Start: 2021-09-25 | End: 2023-03-04 | Stop reason: SDUPTHER

## 2021-09-24 RX ORDER — BISACODYL 10 MG
10 SUPPOSITORY, RECTAL RECTAL ONCE
Status: COMPLETED | OUTPATIENT
Start: 2021-09-24 | End: 2021-09-24

## 2021-09-24 RX ADMIN — INSULIN HUMAN 1 UNITS: 100 INJECTION, SOLUTION PARENTERAL at 06:36

## 2021-09-24 RX ADMIN — OXYCODONE HYDROCHLORIDE 10 MG: 10 TABLET ORAL at 15:39

## 2021-09-24 RX ADMIN — HYDROMORPHONE HYDROCHLORIDE 0.5 MG: 1 INJECTION, SOLUTION INTRAMUSCULAR; INTRAVENOUS; SUBCUTANEOUS at 22:19

## 2021-09-24 RX ADMIN — BISACODYL 15 MG: 5 TABLET, COATED ORAL at 15:38

## 2021-09-24 RX ADMIN — OXYCODONE HYDROCHLORIDE 10 MG: 10 TABLET ORAL at 00:39

## 2021-09-24 RX ADMIN — RIVAROXABAN 15 MG: 15 TABLET, FILM COATED ORAL at 18:17

## 2021-09-24 RX ADMIN — INSULIN HUMAN 1 UNITS: 100 INJECTION, SOLUTION PARENTERAL at 21:30

## 2021-09-24 RX ADMIN — OXYCODONE 5 MG: 5 TABLET ORAL at 18:20

## 2021-09-24 RX ADMIN — BISACODYL 10 MG: 10 SUPPOSITORY RECTAL at 08:27

## 2021-09-24 RX ADMIN — BISACODYL 10 MG: 10 SUPPOSITORY RECTAL at 15:39

## 2021-09-24 RX ADMIN — OXYCODONE HYDROCHLORIDE 10 MG: 10 TABLET ORAL at 06:30

## 2021-09-24 RX ADMIN — OXYCODONE HYDROCHLORIDE 10 MG: 10 TABLET ORAL at 21:25

## 2021-09-24 RX ADMIN — RIVAROXABAN 15 MG: 15 TABLET, FILM COATED ORAL at 08:27

## 2021-09-24 RX ADMIN — SENNOSIDES AND DOCUSATE SODIUM 2 TABLET: 50; 8.6 TABLET ORAL at 18:17

## 2021-09-24 RX ADMIN — BISACODYL 10 MG: 10 SUPPOSITORY RECTAL at 21:24

## 2021-09-24 RX ADMIN — SENNOSIDES AND DOCUSATE SODIUM 2 TABLET: 50; 8.6 TABLET ORAL at 06:30

## 2021-09-24 RX ADMIN — ATORVASTATIN CALCIUM 20 MG: 20 TABLET, FILM COATED ORAL at 18:17

## 2021-09-24 RX ADMIN — OXYCODONE HYDROCHLORIDE 10 MG: 10 TABLET ORAL at 10:40

## 2021-09-24 RX ADMIN — TAMSULOSIN HYDROCHLORIDE 0.4 MG: 0.4 CAPSULE ORAL at 08:27

## 2021-09-24 ASSESSMENT — PAIN DESCRIPTION - PAIN TYPE
TYPE: ACUTE PAIN

## 2021-09-24 NOTE — DISCHARGE SUMMARY
"Discharge Summary    CHIEF COMPLAINT ON ADMISSION  Chief Complaint   Patient presents with   • Abdominal Pain     Reports gen abd pain. Denies bloody stools, V/D or fevers. Reports LNBM yesterday.   • Urinary Retention     \"I can't urinate\". Reports last time 0300 this morning.        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 He had CTAP with IV contrast and it was noted partial thrombosis of the superior mesenteric vein with associated mesenteric edema and bowel wall thickening involving the small bowel in the midabdomen and right lower quadrant.   Admitting physician spoke with vascular surgery, Dr. Praneeth Hutson, no indication for intervention, patient to continue with anticoagulation, was transitioned from therapeutic lovenox to Xarelto.  Alicia catheter removed and he is able to void without issue.  He still has Xarelto at home and will resume taking this for his recurrent clot and will need to be on blood thinners life long as this is his second emboli.  He had significant discomfort from constipation but after multiple medications has been able to have a bowel movement.  After receiving magnesium citrate, he was able to have multiple bowel movements and is feeling markedly better.      Therefore, he is discharged in good and stable condition to home with close outpatient follow-up.        Discharge Date  9/26/2021    FOLLOW UP ITEMS POST DISCHARGE  PCP - 2 weeks    DISCHARGE DIAGNOSES  Principal Problem:    Superior mesenteric vein thrombosis (HCC) POA: Unknown  Active Problems:    Urinary retention POA: Unknown    Thrombocytopathia (HCC) POA: Unknown    Hyponatremia POA: Unknown    Tobacco use disorder POA: Unknown    Nausea POA: Unknown    Abdominal pain POA: Unknown    " Constipation POA: Unknown  Resolved Problems:    * No resolved hospital problems. *      FOLLOW UP  No future appointments.  Ron Edouard M.D.  [address]  [place] [id number] [phone]            MEDICATIONS ON DISCHARGE     Medication List      START taking these medications      Instructions   * rivaroxaban 15 MG Tabs tablet  Commonly known as: XARELTO   Take 1 Tablet by mouth 2 times a day with meals for 21 days.  Dose: 15 mg     * rivaroxaban 20 MG Tabs tablet  Start taking on: October 13, 2021  Commonly known as: XARELTO   Take 1 Tablet by mouth with dinner for 90 days.  Dose: 20 mg     tamsulosin 0.4 MG capsule  Start taking on: September 25, 2021  Commonly known as: FLOMAX   Take 1 Capsule by mouth 1/2 hour after breakfast.  Dose: 0.4 mg         * This list has 2 medication(s) that are the same as other medications prescribed for you. Read the directions carefully, and ask your doctor or other care provider to review them with you.            CONTINUE taking these medications      Instructions   gabapentin 300 MG Caps  Commonly known as: NEURONTIN   Take 600 mg by mouth every day.  Dose: 600 mg        STOP taking these medications    aspirin  MG Tbec  Commonly known as: ECOTRIN     IBUPROFEN PM PO            Allergies  No Known Allergies    DIET  Orders Placed This Encounter   Procedures   • Diet Order Diet: Full Liquid     Standing Status:   Standing     Number of Occurrences:   1     Order Specific Question:   Diet:     Answer:   Full Liquid [11]       ACTIVITY  As tolerated.  Weight bearing as tolerated    CONSULTATIONS  none    PROCEDURES  none    LABORATORY  Lab Results   Component Value Date    SODIUM 132 (L) 09/24/2021    POTASSIUM 4.3 09/24/2021    CHLORIDE 100 09/24/2021    CO2 23 09/24/2021    GLUCOSE 138 (H) 09/24/2021    BUN 11 09/24/2021    CREATININE 0.92 09/24/2021        Lab Results   Component Value Date    WBC 11.2 (H) 09/24/2021    HEMOGLOBIN 15.4 09/24/2021     HEMATOCRIT 46.2 09/24/2021    PLATELETCT 146 (L) 09/24/2021        Total time of the discharge process exceeds 32 minutes.

## 2021-09-24 NOTE — PROGRESS NOTES
Shift report received from off going RN. Patient resting in bed and shift start. Following breakfast the plan of care for today discussed with patient (hopeful bowel movement, anticoagulation administration, possible discharge to home today). Suppository given and enema ordered as needed for patient. Bowel movement for today to be monitored.

## 2021-09-24 NOTE — DISCHARGE PLANNING
Anticipated Discharge Disposition: Home    Action: LSW completed chart review. Discussed pt in morning rounds. Per MD, pt is cleared to d/c today. Pt has a 6-clicks score of 18. No needs reported or identified at this time.     Barriers to Discharge: None    Plan: LSW to follow and assist as needed.

## 2021-09-25 ENCOUNTER — APPOINTMENT (OUTPATIENT)
Dept: RADIOLOGY | Facility: MEDICAL CENTER | Age: 61
End: 2021-09-25
Attending: INTERNAL MEDICINE
Payer: MEDICAID

## 2021-09-25 LAB
GLUCOSE BLD-MCNC: 140 MG/DL (ref 65–99)
GLUCOSE BLD-MCNC: 165 MG/DL (ref 65–99)
GLUCOSE BLD-MCNC: 170 MG/DL (ref 65–99)
GLUCOSE BLD-MCNC: 172 MG/DL (ref 65–99)

## 2021-09-25 PROCEDURE — A9270 NON-COVERED ITEM OR SERVICE: HCPCS | Performed by: INTERNAL MEDICINE

## 2021-09-25 PROCEDURE — 96372 THER/PROPH/DIAG INJ SC/IM: CPT

## 2021-09-25 PROCEDURE — G0378 HOSPITAL OBSERVATION PER HR: HCPCS

## 2021-09-25 PROCEDURE — A9270 NON-COVERED ITEM OR SERVICE: HCPCS | Performed by: GENERAL PRACTICE

## 2021-09-25 PROCEDURE — 700111 HCHG RX REV CODE 636 W/ 250 OVERRIDE (IP): Performed by: INTERNAL MEDICINE

## 2021-09-25 PROCEDURE — 700105 HCHG RX REV CODE 258: Performed by: GENERAL PRACTICE

## 2021-09-25 PROCEDURE — 700102 HCHG RX REV CODE 250 W/ 637 OVERRIDE(OP): Performed by: INTERNAL MEDICINE

## 2021-09-25 PROCEDURE — 700102 HCHG RX REV CODE 250 W/ 637 OVERRIDE(OP): Performed by: GENERAL PRACTICE

## 2021-09-25 PROCEDURE — 82962 GLUCOSE BLOOD TEST: CPT

## 2021-09-25 PROCEDURE — 99225 PR SUBSEQUENT OBSERVATION CARE,LEVEL II: CPT | Performed by: INTERNAL MEDICINE

## 2021-09-25 PROCEDURE — 94760 N-INVAS EAR/PLS OXIMETRY 1: CPT

## 2021-09-25 RX ADMIN — SODIUM CHLORIDE: 9 INJECTION, SOLUTION INTRAVENOUS at 07:54

## 2021-09-25 RX ADMIN — SENNOSIDES AND DOCUSATE SODIUM 2 TABLET: 50; 8.6 TABLET ORAL at 16:26

## 2021-09-25 RX ADMIN — INSULIN HUMAN 1 UNITS: 100 INJECTION, SOLUTION PARENTERAL at 11:13

## 2021-09-25 RX ADMIN — MAGNESIUM CITRATE 296 ML: 1.75 LIQUID ORAL at 13:05

## 2021-09-25 RX ADMIN — OXYCODONE HYDROCHLORIDE 10 MG: 10 TABLET ORAL at 06:14

## 2021-09-25 RX ADMIN — ATORVASTATIN CALCIUM 20 MG: 20 TABLET, FILM COATED ORAL at 16:26

## 2021-09-25 RX ADMIN — TAMSULOSIN HYDROCHLORIDE 0.4 MG: 0.4 CAPSULE ORAL at 07:54

## 2021-09-25 RX ADMIN — RIVAROXABAN 15 MG: 15 TABLET, FILM COATED ORAL at 16:26

## 2021-09-25 RX ADMIN — METHYLNALTREXONE BROMIDE 12 MG: 12 INJECTION, SOLUTION SUBCUTANEOUS at 10:12

## 2021-09-25 RX ADMIN — RIVAROXABAN 15 MG: 15 TABLET, FILM COATED ORAL at 07:53

## 2021-09-25 RX ADMIN — INSULIN HUMAN 1 UNITS: 100 INJECTION, SOLUTION PARENTERAL at 06:17

## 2021-09-25 ASSESSMENT — ENCOUNTER SYMPTOMS
CLAUDICATION: 0
COUGH: 0
BLURRED VISION: 0
WEAKNESS: 0
SENSORY CHANGE: 0
DIARRHEA: 0
VOMITING: 0
DIZZINESS: 0
PHOTOPHOBIA: 0
FEVER: 0
MYALGIAS: 0
INSOMNIA: 0
NAUSEA: 0
NERVOUS/ANXIOUS: 0
HEADACHES: 0
CONSTIPATION: 1
HEARTBURN: 0
DEPRESSION: 0
SPEECH CHANGE: 0
ABDOMINAL PAIN: 1
CHILLS: 0
SHORTNESS OF BREATH: 0

## 2021-09-25 ASSESSMENT — PAIN DESCRIPTION - PAIN TYPE
TYPE: ACUTE PAIN
TYPE: ACUTE PAIN

## 2021-09-25 NOTE — PROGRESS NOTES
No significant bowel movements noted with given medications and enemas. Patient diet increased to GI soft per physician order. Abdominal discomfort/bloating to be monitored. End of shift patient complaints of increased abdominal discomfort following dinner. Diet changed back to full liquid diet.

## 2021-09-25 NOTE — PROGRESS NOTES
Hospital Medicine Daily Progress Note    Date of Service  9/25/2021    Chief Complaint  Ajit Gregory is a 60 y.o. male admitted 9/22/2021 with abdominal pain and urinary retention.    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 He had CTAP with IV contrast and it was noted partial thrombosis of the superior mesenteric vein with associated mesenteric edema and bowel wall thickening involving the small bowel in the midabdomen and right lower quadrant.   Admitting physician spoke with vascular surgery, Dr. Praneeth Hutson, no indication for intervention, patient to continue with anticoagulation, was transitioned from therapeutic lovenox to Xarelto.       Interval Problem Update  9/23 Patient feeling anxious this am as he feels he needs to have a bowel movement and had episode of nausea and vomiting this am.  Terrazas was placed on admission due to inability to void.  Will dc terrazas in attempt of voiding trial and leave out if able to urinate.  Xarelto covered by insurance and once his symptoms are better controlled with abdominal pain and he is able to tolerate PO intake, will dc home at that time.  9/24 Patient feeling much better regarding ability to void, no issue after terrazas removed.  He is still having significant constipation and received dulcolax suppository and enema without relief.  PO/CO dulcolax to be given and will work toward bowel movement, after BM, he will discharge home.   9/25 Patient still without bowel movement, KUB done late last night showed no evidence of bowel obstruction, he has good bowel sounds and should be able to have a bm.  Initially we tried prune juice and warm butter and relistor but that hasn't yielded result.  I've now ordered magnesium citrate.    I have personally seen and examined the patient at bedside. I discussed the plan of care with patient,  bedside RN, charge RN,  and pharmacy.    Consultants/Specialty  none    Code Status  Full Code    Disposition  Patient is not medically cleared.   Anticipate discharge to to home with close outpatient follow-up.  I have placed the appropriate orders for post-discharge needs.    Review of Systems  Review of Systems   Constitutional: Negative for chills and fever.   HENT: Negative for congestion.    Eyes: Negative for blurred vision and photophobia.   Respiratory: Negative for cough and shortness of breath.    Cardiovascular: Negative for chest pain, claudication and leg swelling.   Gastrointestinal: Positive for abdominal pain and constipation. Negative for diarrhea, heartburn, nausea and vomiting.   Genitourinary: Negative for dysuria, hematuria and urgency.   Musculoskeletal: Negative for joint pain and myalgias.   Skin: Negative for itching and rash.   Neurological: Negative for dizziness, sensory change, speech change, weakness and headaches.   Psychiatric/Behavioral: Negative for depression. The patient is not nervous/anxious and does not have insomnia.         Physical Exam  Temp:  [36.7 °C (98 °F)-36.8 °C (98.3 °F)] 36.7 °C (98.1 °F)  Pulse:  [83-95] 83  Resp:  [18-20] 18  BP: (127-170)/(70-80) 162/75  SpO2:  [90 %-93 %] 90 %    Physical Exam  Vitals and nursing note reviewed.   Constitutional:       General: He is not in acute distress.     Appearance: Normal appearance.   HENT:      Head: Normocephalic and atraumatic.   Eyes:      General: No scleral icterus.     Extraocular Movements: Extraocular movements intact.   Cardiovascular:      Rate and Rhythm: Normal rate and regular rhythm.      Pulses: Normal pulses.      Heart sounds: Normal heart sounds. No murmur heard.     Pulmonary:      Effort: Pulmonary effort is normal. No respiratory distress.      Breath sounds: Normal breath sounds. No wheezing, rhonchi or rales.   Abdominal:      General: Abdomen is flat. Bowel sounds are normal. There is  distension.      Palpations: Abdomen is soft.      Tenderness: There is no rebound.   Musculoskeletal:         General: No swelling or tenderness.      Cervical back: Normal range of motion and neck supple.   Lymphadenopathy:      Cervical: No cervical adenopathy.   Skin:     Coloration: Skin is not jaundiced.      Findings: No erythema.   Neurological:      General: No focal deficit present.      Mental Status: He is alert and oriented to person, place, and time. Mental status is at baseline.      Cranial Nerves: No cranial nerve deficit.   Psychiatric:         Mood and Affect: Mood normal.         Behavior: Behavior normal.         Fluids    Intake/Output Summary (Last 24 hours) at 9/25/2021 1401  Last data filed at 9/25/2021 0900  Gross per 24 hour   Intake 460 ml   Output --   Net 460 ml       Laboratory  Recent Labs     09/23/21  0139 09/24/21  0359   WBC 13.2* 11.2*   RBC 5.64 5.07   HEMOGLOBIN 17.3 15.4   HEMATOCRIT 51.3 46.2   MCV 91.0 91.1   MCH 30.7 30.4   MCHC 33.7 33.3*   RDW 55.6* 55.5*   PLATELETCT 164 146*   MPV 10.7 10.6     Recent Labs     09/23/21  0139 09/24/21  0359   SODIUM 131* 132*   POTASSIUM 4.0 4.3   CHLORIDE 96 100   CO2 19* 23   GLUCOSE 164* 138*   BUN 11 11   CREATININE 0.84 0.92   CALCIUM 8.7 8.5                   Imaging  [id number] VIEW   Final Result      1.  Nonobstructive bowel gas pattern.   2.  Mildly prominent small bowel in the right abdomen with persistent persistent wall thickening seen on the prior CT.      CT-ABDOMEN-PELVIS WITH   Final Result      1.  Partial thrombosis of the superior mesenteric vein with associated mesenteric edema and bowel wall thickening involving the small bowel in the midabdomen and right lower quadrant.      2.  No evidence of bowel infarction.      3.  No colonic obstruction or inflammation.      Findings were discussed with RHETT HUBER M.D. on 9/22/2021 1:53 PM.           Assessment/Plan  * Superior mesenteric vein thrombosis  (HCC)  Assessment & Plan  CTAP with IV noted partial thrombosis of the superior mesenteric vein with associated mesenteric edema and bowel wall thickening involving the small bowel in the midabdomen and right lower quadrant.    Admitting physician spoke with vascular surgery, Dr. Praneeth Hutson, no indication for surgical intervention, patient to continue with anticoagulation  Currently on Xarelto      Constipation  Assessment & Plan  Bowel protocol,KUB shows no evidence of obstruction  Enema given without result  Po/pr dulcolax given without result  Relistor given without result  Magnesium citrate now      Urinary retention  Assessment & Plan  Resolved  Voiding without difficulty after terrazas removed  Continue with flomax        Abdominal pain  Assessment & Plan  Secondary to SMV thrombus  Symptomatic treatment    Nausea  Assessment & Plan  Secondary to SMV thrombus  Symptomatic treatment      Tobacco use disorder  Assessment & Plan  Nicotine replacement protocol and cessation education provided for 12 minutes, discussed options of nicotine patch, acupuncture, medical treatment with wellbutrin and chantix. Discussed other options. Code 55798    Hyponatremia  Assessment & Plan  Mild   Continue to monitor    Thrombocytopathia (HCC)  Assessment & Plan  No active bleeding  Continue to monitor       VTE prophylaxis: therapeutic anticoagulation with Xarelto    I have performed a physical exam and reviewed and updated ROS and Plan today (9/25/2021). In review of yesterday's note (9/24/2021), there are no changes except as documented above.

## 2021-09-25 NOTE — DISCHARGE INSTRUCTIONS
Discharge Instructions    Discharged to home by car with relative. Discharged via wheelchair, hospital escort: Yes.  Special equipment needed: Not Applicable    Be sure to schedule a follow-up appointment with your primary care doctor or any specialists as instructed.     Discharge Plan:   Diet Plan: Discussed  Activity Level: Discussed  Confirmed Follow up Appointment: Patient to Call and Schedule Appointment  Confirmed Symptoms Management: Discussed  Medication Reconciliation Updated: Yes  Influenza Vaccine Indication: Indicated: 9 to 64 years of age  Influenza Vaccine Given - only chart on this line when given: Influenza Vaccine Given (See MAR)    I understand that a diet low in cholesterol, fat, and sodium is recommended for good health. Unless I have been given specific instructions below for another diet, I accept this instruction as my diet prescription.   Other diet: Full Liquid     Special Instructions: None    · Is patient discharged on Warfarin / Coumadin?   No     Xarelto Oral Tablet 15 mg  Brand Names: Xarelto Oral Tablet 15 mg      Uses  This medicine is used for the following purposes:  · blood disorder  · prevent blood clots  · blood clot  Instructions  Take the medicine with food.  Store at room temperature in a dry place. Do not keep in the bathroom.  Keep the medicine away from heat and light.  Avoid grapefruit juice while on this medicine.  It is important that you keep taking each dose of this medicine on time even if you are feeling well.  If you forget to take a dose on time, take it as soon as you remember. If it is almost time for the next dose, do not take the missed dose. Return to your normal dosing schedule. Do not take 2 doses of this medicine at one time.  Please tell your doctor and pharmacist about all the medicines you take. Include both prescription and over-the-counter medicines. Also tell them about any vitamins, herbal medicines, or anything else you take for your health.  Do  not suddenly stop taking this medicine. Check with your doctor before stopping.  It is very important that you follow your doctor's instructions for all blood tests.  Cautions  Tell your doctor and pharmacist if you ever had an allergic reaction to a medicine. Symptoms of an allergic reaction can include trouble breathing, skin rash, itching, swelling, or severe dizziness.  Some patients taking this medicine have experienced serious side effects. Please speak with your doctor to understand the risks and benefits associated with this medicine.  This medicine may cause serious bleeding from the stomach or bowels. Stop this medicine and call your doctor immediately if you see any signs of bleeding. Bleeding can cause pain in the stomach, vomiting up liquid that looks like coffee grounds, and red or dark tarry stools.  There is an increased risk of bleeding while on this medicine, please tell your doctor or nurse if you notice any excessive bleeding or bruising.  Do not use the medication any more than instructed.  Speak with your doctor before taking any medicine with aspirin.  Tell the doctor or pharmacist if you are pregnant, planning to be pregnant, or breastfeeding.  Do not take Methow's wort while on this medicine.  Ask your pharmacist if this medicine can interact with any of your other medicines. Be sure to tell them about all the medicines you take.  Please tell all your doctors and dentists that you are on this medicine before they provide care.  Do not start or stop any other medicines without first speaking to your doctor or pharmacist.  This medicine can cause serious side effects in some patients. Important information from the [place] Food and Drug Administration (FDA) is available from your pharmacist. Please review it carefully with your pharmacist to understand the risks associated with this medicine.  Always refill this medicine before it runs out.  Side Effects  The following is a list of some common  side effects from this medicine. Please speak with your doctor about what you should do if you experience these or other side effects.  · back pain  · increased risk of bleeding  · itching  · muscle pain  Call your doctor or get medical help right away if you notice any of these more serious side effects:  · bleeding that is severe or takes longer to stop  · unusual bruising or discoloration on skin  · confusion  · fainting  · loss of movement anywhere on the body  · feeling of numbness or tingling in your hands and feet  · slurred speech  · dark, tarry stool  · unusual or unexplained tiredness or weakness  · weakness on one side of the body  Extra  Please speak with your doctor, nurse, or pharmacist if you have any questions about this medicine.   [email or web address]    IMPORTANT NOTE: This document tells you briefly how to take your medicine, but it does not tell you all there is to know about it.Your doctor or pharmacist may give you other documents about your medicine. Please talk to them if you have any questions.Always follow their advice. There is a more complete description of this medicine available in English.Scan this code on your smartphone or tablet or use the web address below. You can also ask your pharmacist for a printout. If you have any questions, please ask your pharmacist.         Deep Vein Thrombosis    Deep vein thrombosis (DVT) is a condition in which a blood clot forms in a deep vein, such as a lower leg, thigh, or arm vein. A clot is blood that has thickened into a gel or solid. This condition is dangerous. It can lead to serious and even life-threatening complications if the clot travels to the lungs and causes a blockage (pulmonary embolism). It can also damage veins in the leg. This can result in leg pain, swelling, discoloration, and sores (post-thrombotic syndrome).  What are the causes?  This condition may be caused by:  · A slowdown of blood flow.  · Damage  to a vein.  · A condition that causes blood to clot more easily, such as an inherited clotting disorder.  What increases the risk?  The following factors may make you more likely to develop this condition:  · Being overweight.  · Being older, especially over age 60.  · Sitting or lying down for more than four hours.  · Being in the hospital.  · Lack of physical activity (sedentary lifestyle).  · Pregnancy, being in childbirth, or having recently given birth.  · Taking medicines that contain estrogen, such as medicines to prevent pregnancy.  · Smoking.  · A history of any of the following:  ? Blood clots or a blood clotting disease.  ? Peripheral vascular disease.  ? Inflammatory bowel disease.  ? Cancer.  ? Heart disease.  ? Genetic conditions that affect how your blood clots, such as Factor V Leiden mutation.  ? Neurological diseases that affect your legs (leg paresis).  ? A recent injury, such as a car accident.  ? Major or lengthy surgery.  ? A central line placed inside a large vein.  What are the signs or symptoms?  Symptoms of this condition include:  · Swelling, pain, or tenderness in an arm or leg.  · Warmth, redness, or discoloration in an arm or leg.  If the clot is in your leg, symptoms may be more noticeable or worse when you stand or walk. Some people may not develop any symptoms.  How is this diagnosed?  This condition is diagnosed with:  · A medical history and physical exam.  · Tests, such as:  ? Blood tests. These are done to check how well your blood clots.  ? Ultrasound. This is done to check for clots.  ? Venogram. For this test, contrast dye is injected into a vein and X-rays are taken to check for any clots.  How is this treated?  Treatment for this condition depends on:  · The cause of your DVT.  · Your risk for bleeding or developing more clots.  · Any other medical conditions that you have.  Treatment may include:  · Taking a blood thinner (anticoagulant). This type of medicine prevents  clots from forming. It may be taken by mouth, injected under the skin, or injected through an IV (catheter).  · Injecting clot-dissolving medicines into the affected vein (catheter-directed thrombolysis).  · Having surgery. Surgery may be done to:  ? Remove the clot.  ? Place a filter in a large vein to catch blood clots before they reach the lungs.  Some treatments may be continued for up to six months.  Follow these instructions at home:  If you are taking blood thinners:  · Take the medicine exactly as told by your health care provider. Some blood thinners need to be taken at the same time every day. Do not skip a dose.  · Talk with your health care provider before you take any medicines that contain aspirin or NSAIDs. These medicines increase your risk for dangerous bleeding.  · Ask your health care provider about foods and drugs that could change the way the medicine works (may interact). Avoid those things if your health care provider tells you to do so.  · Blood thinners can cause easy bruising and may make it difficult to stop bleeding. Because of this:  ? Be very careful when using knives, scissors, or other sharp objects.  ? Use an electric razor instead of a blade.  ? Avoid activities that could cause injury or bruising, and follow instructions about how to prevent falls.  · Wear a medical alert bracelet or carry a card that lists what medicines you take.  General instructions  · Take over-the-counter and prescription medicines only as told by your health care provider.  · Return to your normal activities as told by your health care provider. Ask your health care provider what activities are safe for you.  · Wear compression stockings if recommended by your health care provider.  · Keep all follow-up visits as told by your health care provider. This is important.  How is this prevented?  To lower your risk of developing this condition again:  · For 30 or more minutes every day, do an activity  that:  ? Involves moving your arms and legs.  ? Increases your heart rate.  · When traveling for longer than four hours:  ? Exercise your arms and legs every hour.  ? Drink plenty of water.  ? Avoid drinking alcohol.  · Avoid sitting or lying for a long time without moving your legs.  · If you have surgery or you are hospitalized, ask about ways to prevent blood clots. These may include taking frequent walks or using anticoagulants.  · Stay at a healthy weight.  · If you are a woman who is older than age 35, avoid unnecessary use of medicines that contain estrogen, such as some birth control pills.  · Do not use any products that contain nicotine or tobacco, such as cigarettes and e-cigarettes. This is especially important if you take estrogen medicines. If you need help quitting, ask your health care provider.  Contact a health care provider if:  · You miss a dose of your blood thinner.  · Your menstrual period is heavier than usual.  · You have unusual bruising.  Get help right away if:  · You have:  ? New or increased pain, swelling, or redness in an arm or leg.  ? Numbness or tingling in an arm or leg.  ? Shortness of breath.  ? Chest pain.  ? A rapid or irregular heartbeat.  ? A severe headache or confusion.  ? A cut that will not stop bleeding.  · There is blood in your vomit, stool, or urine.  · You have a serious fall or accident, or you hit your head.  · You feel light-headed or dizzy.  · You cough up blood.  These symptoms may represent a serious problem that is an emergency. Do not wait to see if the symptoms will go away. Get medical help right away. Call your local emergency services (911 in the [place] Do not drive yourself to the hospital.  Summary  · Deep vein thrombosis (DVT) is a condition in which a blood clot forms in a deep vein, such as a lower leg, thigh, or arm vein.  · Symptoms can include swelling, warmth, pain, and redness in your leg or arm.  · This condition may be treated with a blood  thinner (anticoagulant medicine), medicine that is injected to dissolve blood clots,compression stockings, or surgery.  · If you are prescribed blood thinners, take them exactly as told.  This information is not intended to replace advice given to you by your health care provider. Make sure you discuss any questions you have with your health care provider.  Document Released: 12/18/2006 Document Revised: 11/30/2018 Document Reviewed: 05/18/2018  [employer] Patient Education © 2020 [employer]      Depression / Suicide Risk    As you are discharged from this [facility] facility, it is important to learn how to keep safe from harming yourself.    Recognize the warning signs:  · Abrupt changes in personality, positive or negative- including increase in energy   · Giving away possessions  · Change in eating patterns- significant weight changes-  positive or negative  · Change in sleeping patterns- unable to sleep or sleeping all the time   · Unwillingness or inability to communicate  · Depression  · Unusual sadness, discouragement and loneliness  · Talk of wanting to die  · Neglect of personal appearance   · Rebelliousness- reckless behavior  · Withdrawal from people/activities they love  · Confusion- inability to concentrate     If you or a loved one observes any of these behaviors or has concerns about self-harm, here's what you can do:  · Talk about it- your feelings and reasons for harming yourself  · Remove any means that you might use to hurt yourself (examples: pills, rope, extension cords, firearm)  · Get professional help from the community (Mental Health, Substance Abuse, psychological counseling)  · Do not be alone:Call your Safe Contact- someone whom you trust who will be there for you.  · Call your local CRISIS HOTLINE 379-0674 or 513-477-1033  · Call your local Children's Mobile Crisis Response Team [place] (061) 064-7553 or www.[employer]  · Call the toll free National Suicide Prevention  Hotlines   · National Suicide Prevention Lifeline 654-670-OTLO (9827)  · National Hope Line Network 800-SUICIDE (219-6955)

## 2021-09-25 NOTE — PROGRESS NOTES
Pt AOx4, laying in bed, complains of discomfort to abdomen related to lack of bowel movement. Discussed with pt plan of care. Pt verbalized understanding. Pt up, ambulating, attempting to increase bowel motility.  COVID-19 surge charting in place.     1000: pt given prune juice and butter to assist with BM  1012: pt given methylnaltrexone per MD to assist with BM, see MAR.     1040: Pt complain of gas pain, passing gas, continue to monitor for BM.    1215: Pt abdomen distended, firm, passing gas, no BM.     1250: MD updated still no BM. Magnesium citrate ordered. Given see MAR.     1433: Still no BM. Pt up ambulated to increase bowel motility.     1700: No BM. Pt drinking water, encouraged water intake, pt verbalized understanding and assured this RN they have been drinking water this shift.

## 2021-09-25 NOTE — PROGRESS NOTES
Hospital Medicine Daily Progress Note    Date of Service  Late entry for 9/24/21    Chief Complaint  Ajit Gregory is a 60 y.o. male admitted 9/22/2021 with abdominal pain and urinary retention.    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 He had CTAP with IV contrast and it was noted partial thrombosis of the superior mesenteric vein with associated mesenteric edema and bowel wall thickening involving the small bowel in the midabdomen and right lower quadrant.   Admitting physician spoke with vascular surgery, Dr. Praneeth Hutson, no indication for intervention, patient to continue with anticoagulation, was transitioned from therapeutic lovenox to Xarelto.       Interval Problem Update  9/23 Patient feeling anxious this am as he feels he needs to have a bowel movement and had episode of nausea and vomiting this am.  Terrazas was placed on admission due to inability to void.  Will dc terrazas in attempt of voiding trial and leave out if able to urinate.  Xarelto covered by insurance and once his symptoms are better controlled with abdominal pain and he is able to tolerate PO intake, will dc home at that time.  9/24 Patient feeling much better regarding ability to void, no issue after terrazas removed.  He is still having significant constipation and received dulcolax suppository and enema without relief.  PO/NY dulcolax to be given and will work toward bowel movement, after BM, he will discharge home.     I have personally seen and examined the patient at bedside. I discussed the plan of care with patient, bedside RN, charge RN,  and pharmacy.    Consultants/Specialty  none    Code Status  Full Code    Disposition  Patient is not medically cleared.   Anticipate discharge to to home with close outpatient follow-up.  I have placed the appropriate orders for post-discharge  needs.    Review of Systems  Review of Systems   Constitutional: Negative for chills and fever.   HENT: Negative for congestion.    Eyes: Negative for blurred vision and photophobia.   Respiratory: Negative for cough and shortness of breath.    Cardiovascular: Negative for chest pain, claudication and leg swelling.   Gastrointestinal: Positive for abdominal pain and constipation. Negative for diarrhea, heartburn, nausea and vomiting.   Genitourinary: Negative for dysuria, hematuria and urgency.   Musculoskeletal: Negative for joint pain and myalgias.   Skin: Negative for itching and rash.   Neurological: Negative for dizziness, sensory change, speech change, weakness and headaches.   Psychiatric/Behavioral: Negative for depression. The patient is not nervous/anxious and does not have insomnia.         Physical Exam  Temp:  [36.6 °C (97.9 °F)-36.8 °C (98.3 °F)] 36.8 °C (98.3 °F)  Pulse:  [86-95] 86  Resp:  [18-20] 18  BP: (127-170)/(70-80) 142/70  SpO2:  [90 %-93 %] 90 %    Physical Exam  Vitals and nursing note reviewed.   Constitutional:       General: He is not in acute distress.     Appearance: Normal appearance.   HENT:      Head: Normocephalic and atraumatic.   Eyes:      General: No scleral icterus.     Extraocular Movements: Extraocular movements intact.   Cardiovascular:      Rate and Rhythm: Normal rate and regular rhythm.      Pulses: Normal pulses.      Heart sounds: Normal heart sounds. No murmur heard.     Pulmonary:      Effort: Pulmonary effort is normal. No respiratory distress.      Breath sounds: Normal breath sounds. No wheezing, rhonchi or rales.   Abdominal:      General: Abdomen is flat. Bowel sounds are normal. There is no distension.      Palpations: Abdomen is soft.      Tenderness: There is no rebound.   Musculoskeletal:         General: No swelling or tenderness.      Cervical back: Normal range of motion and neck supple.   Lymphadenopathy:      Cervical: No cervical adenopathy.   Skin:      Coloration: Skin is not jaundiced.      Findings: No erythema.   Neurological:      General: No focal deficit present.      Mental Status: He is alert and oriented to person, place, and time. Mental status is at baseline.      Cranial Nerves: No cranial nerve deficit.   Psychiatric:         Mood and Affect: Mood normal.         Behavior: Behavior normal.         Fluids    Intake/Output Summary (Last 24 hours) at 9/25/2021 0711  Last data filed at 9/24/2021 1700  Gross per 24 hour   Intake 680 ml   Output --   Net 680 ml       Laboratory  Recent Labs     09/22/21  1209 09/23/21  0139 09/24/21  0359   WBC 12.7* 13.2* 11.2*   RBC 5.90 5.64 5.07   HEMOGLOBIN 18.3* 17.3 15.4   HEMATOCRIT 54.5* 51.3 46.2   MCV 92.4 91.0 91.1   MCH 31.0 30.7 30.4   MCHC 33.6* 33.7 33.3*   RDW 54.0* 55.6* 55.5*   PLATELETCT 159* 164 146*   MPV 9.8 10.7 10.6     Recent Labs     09/22/21  1209 09/23/21  0139 09/24/21  0359   SODIUM 133* 131* 132*   POTASSIUM 4.2 4.0 4.3   CHLORIDE 100 96 100   CO2 17* 19* 23   GLUCOSE 222* 164* 138*   BUN 10 11 11   CREATININE 0.97 0.84 0.92   CALCIUM 9.1 8.7 8.5     Recent Labs     09/22/21  1209   APTT 30.4   INR 0.95         Recent Labs     09/22/21  1209   TRIGLYCERIDE 238*   HDL 28*   LDL 78       Imaging  [id number] VIEW   Final Result      1.  Nonobstructive bowel gas pattern.   2.  Mildly prominent small bowel in the right abdomen with persistent persistent wall thickening seen on the prior CT.      CT-ABDOMEN-PELVIS WITH   Final Result      1.  Partial thrombosis of the superior mesenteric vein with associated mesenteric edema and bowel wall thickening involving the small bowel in the midabdomen and right lower quadrant.      2.  No evidence of bowel infarction.      3.  No colonic obstruction or inflammation.      Findings were discussed with RHETT HUBER M.D. on 9/22/2021 1:53 PM.           Assessment/Plan  * Superior mesenteric vein thrombosis (HCC)  Assessment & Plan  CTAP with IV noted  partial thrombosis of the superior mesenteric vein with associated mesenteric edema and bowel wall thickening involving the small bowel in the midabdomen and right lower quadrant.    Admitting physician spoke with vascular surgery, Dr. Praneeth Hutson, no indication for surgical intervention, patient to continue with anticoagulation  Currently on Xarelto      Urinary retention  Assessment & Plan  Resolved  Voiding without difficulty after terrazas removed  Continue with flomax        Constipation  Assessment & Plan  Bowel protocol  Enema given without result  Po/pr dulcolax given      Abdominal pain  Assessment & Plan  Secondary to SMV thrombus  Symptomatic treatment    Nausea  Assessment & Plan  Secondary to SMV thrombus  Symptomatic treatment      Tobacco use disorder  Assessment & Plan  Nicotine replacement protocol and cessation education provided for 12 minutes, discussed options of nicotine patch, acupuncture, medical treatment with wellbutrin and chantix. Discussed other options. Code 62733    Hyponatremia  Assessment & Plan  Mild   Continue to monitor    Thrombocytopathia (HCC)  Assessment & Plan  No active bleeding  Continue to monitor       VTE prophylaxis: therapeutic anticoagulation with Xarelto    I have performed a physical exam and reviewed and updated ROS and Plan today (9/25/2021). In review of yesterday's note (9/24/2021), there are no changes except as documented above.

## 2021-09-25 NOTE — DISCHARGE PLANNING
Anticipated Discharge Disposition: Home     Action: Pt discussed in morning rounds. Per Dr. Sepulveda pt anticipated to d/c today. Pt has a 6-clicks score of 18. No needs reported or identified at this time.      Barriers to Discharge: None     Plan: LSW to follow and assist as needed.

## 2021-09-25 NOTE — PROGRESS NOTES
Pt c/o abd pain throughout shift.  Pain increased post dinner, MD notified. KUB ordered. Pt concerned about urinary retention as, per pt, he had similar symptoms when he needed to be cathed. Bladder scan showed 50mL. KUB resulted and MD gave order for suppository now and pain meds. Pt encouraged to ambulate, reposition self in bed, hot packs provided. Pt began passing gas after dilaudid administration. Pt stated relief and that he was was able to void again. Total pain meds given this shift: Oxy 10mg given twice and dilaudid once. Pt had smear of BM this morning.  BGs this shift were 177 in the evening and 172 this morning.  Pt requested to be saline locked this shift around 2200 due to concerns about ambulating with pole frequently.   Chart check done.  COVID-19 surge in effect.

## 2021-09-26 VITALS
TEMPERATURE: 98.3 F | OXYGEN SATURATION: 95 % | WEIGHT: 250 LBS | DIASTOLIC BLOOD PRESSURE: 67 MMHG | SYSTOLIC BLOOD PRESSURE: 152 MMHG | BODY MASS INDEX: 30.44 KG/M2 | HEIGHT: 76 IN | HEART RATE: 82 BPM | RESPIRATION RATE: 18 BRPM

## 2021-09-26 LAB
ANION GAP SERPL CALC-SCNC: 9 MMOL/L (ref 7–16)
BUN SERPL-MCNC: 11 MG/DL (ref 8–22)
CALCIUM SERPL-MCNC: 8.7 MG/DL (ref 8.4–10.2)
CHLORIDE SERPL-SCNC: 102 MMOL/L (ref 96–112)
CO2 SERPL-SCNC: 24 MMOL/L (ref 20–33)
CREAT SERPL-MCNC: 0.89 MG/DL (ref 0.5–1.4)
ERYTHROCYTE [DISTWIDTH] IN BLOOD BY AUTOMATED COUNT: 51.7 FL (ref 35.9–50)
GLUCOSE BLD-MCNC: 126 MG/DL (ref 65–99)
GLUCOSE SERPL-MCNC: 134 MG/DL (ref 65–99)
HCT VFR BLD AUTO: 45.6 % (ref 42–52)
HGB BLD-MCNC: 15.4 G/DL (ref 14–18)
MCH RBC QN AUTO: 30.5 PG (ref 27–33)
MCHC RBC AUTO-ENTMCNC: 33.8 G/DL (ref 33.7–35.3)
MCV RBC AUTO: 90.3 FL (ref 81.4–97.8)
PLATELET # BLD AUTO: 179 K/UL (ref 164–446)
PMV BLD AUTO: 10.1 FL (ref 9–12.9)
POTASSIUM SERPL-SCNC: 3.9 MMOL/L (ref 3.6–5.5)
RBC # BLD AUTO: 5.05 M/UL (ref 4.7–6.1)
SODIUM SERPL-SCNC: 135 MMOL/L (ref 135–145)
WBC # BLD AUTO: 6.7 K/UL (ref 4.8–10.8)

## 2021-09-26 PROCEDURE — 700102 HCHG RX REV CODE 250 W/ 637 OVERRIDE(OP): Performed by: GENERAL PRACTICE

## 2021-09-26 PROCEDURE — 94760 N-INVAS EAR/PLS OXIMETRY 1: CPT

## 2021-09-26 PROCEDURE — A9270 NON-COVERED ITEM OR SERVICE: HCPCS | Performed by: GENERAL PRACTICE

## 2021-09-26 PROCEDURE — 36415 COLL VENOUS BLD VENIPUNCTURE: CPT

## 2021-09-26 PROCEDURE — G0378 HOSPITAL OBSERVATION PER HR: HCPCS

## 2021-09-26 PROCEDURE — 80048 BASIC METABOLIC PNL TOTAL CA: CPT

## 2021-09-26 PROCEDURE — 82962 GLUCOSE BLOOD TEST: CPT

## 2021-09-26 PROCEDURE — 99217 PR OBSERVATION CARE DISCHARGE: CPT | Performed by: INTERNAL MEDICINE

## 2021-09-26 PROCEDURE — 85027 COMPLETE CBC AUTOMATED: CPT

## 2021-09-26 RX ADMIN — RIVAROXABAN 15 MG: 15 TABLET, FILM COATED ORAL at 08:26

## 2021-09-26 NOTE — PROGRESS NOTES
Assumed care of patient. Bedside report received from night shift RN. Patient updated on plan of care. Call light is within reach and fall precautions are in place. All needs met at this time. Hourly rounding in place.     Covid19 surge in effect.

## 2021-09-26 NOTE — PROGRESS NOTES
"\"Covid 19 surge in effect\"    Repoer received from day RN Pt is AAO x 4.  Pt reports no pain .POC discussed.Pt refused insulin shot educated pt about the consequence of refusing. Pt verbalized understanding.  All needs met at this time.  Bed in low position.  Call light within reach.  Rounding in place.   "

## 2021-09-26 NOTE — PROGRESS NOTES
Patient is being discharged home. Patient is A&Ox4. IV removed. Discharge instructions provided to patient and reviewed. Patient verbalized understanding and all questions and concerns were addressed. All belongings were packed and taken with. Patient escorted off unit by staff.

## 2022-05-11 ENCOUNTER — HOSPITAL ENCOUNTER (OUTPATIENT)
Facility: MEDICAL CENTER | Age: 62
End: 2022-05-11
Attending: NEUROLOGICAL SURGERY
Payer: COMMERCIAL

## 2022-05-11 PROCEDURE — G0480 DRUG TEST DEF 1-7 CLASSES: HCPCS

## 2022-05-14 LAB
ANABASINE UR-MCNC: <5 NG/ML
COTININE UR-MCNC: 17 NG/ML
NICOTINE UR-MCNC: <15 NG/ML
TRANS-3-OH-COTININE UR-MCNC: <50 NG/ML

## 2023-03-04 ENCOUNTER — HOSPITAL ENCOUNTER (EMERGENCY)
Facility: MEDICAL CENTER | Age: 63
End: 2023-03-04
Attending: EMERGENCY MEDICINE
Payer: COMMERCIAL

## 2023-03-04 ENCOUNTER — PHARMACY VISIT (OUTPATIENT)
Dept: PHARMACY | Facility: MEDICAL CENTER | Age: 63
End: 2023-03-04
Payer: MEDICARE

## 2023-03-04 VITALS
HEIGHT: 76 IN | OXYGEN SATURATION: 96 % | TEMPERATURE: 98 F | WEIGHT: 246.91 LBS | RESPIRATION RATE: 16 BRPM | BODY MASS INDEX: 30.07 KG/M2 | DIASTOLIC BLOOD PRESSURE: 85 MMHG | SYSTOLIC BLOOD PRESSURE: 149 MMHG | HEART RATE: 83 BPM

## 2023-03-04 DIAGNOSIS — K55.069 SUPERIOR MESENTERIC VEIN THROMBOSIS (HCC): ICD-10-CM

## 2023-03-04 DIAGNOSIS — I82.90 VTE (VENOUS THROMBOEMBOLISM): ICD-10-CM

## 2023-03-04 DIAGNOSIS — Z76.0 MEDICATION REFILL: ICD-10-CM

## 2023-03-04 DIAGNOSIS — R39.11 URINARY HESITANCY: ICD-10-CM

## 2023-03-04 LAB
ALBUMIN SERPL BCP-MCNC: 4 G/DL (ref 3.2–4.9)
ALBUMIN/GLOB SERPL: 1.5 G/DL
ALP SERPL-CCNC: 155 U/L (ref 30–99)
ALT SERPL-CCNC: 29 U/L (ref 2–50)
ANION GAP SERPL CALC-SCNC: 8 MMOL/L (ref 7–16)
APPEARANCE UR: CLEAR
APTT PPP: 28.7 SEC (ref 24.7–36)
AST SERPL-CCNC: 16 U/L (ref 12–45)
BASOPHILS # BLD AUTO: 0.2 % (ref 0–1.8)
BASOPHILS # BLD: 0.01 K/UL (ref 0–0.12)
BILIRUB SERPL-MCNC: 0.6 MG/DL (ref 0.1–1.5)
BILIRUB UR QL STRIP.AUTO: NEGATIVE
BUN SERPL-MCNC: 7 MG/DL (ref 8–22)
CALCIUM ALBUM COR SERPL-MCNC: 8.8 MG/DL (ref 8.5–10.5)
CALCIUM SERPL-MCNC: 8.8 MG/DL (ref 8.4–10.2)
CHLORIDE SERPL-SCNC: 102 MMOL/L (ref 96–112)
CO2 SERPL-SCNC: 23 MMOL/L (ref 20–33)
COLOR UR: YELLOW
CREAT SERPL-MCNC: 0.88 MG/DL (ref 0.5–1.4)
EOSINOPHIL # BLD AUTO: 0.1 K/UL (ref 0–0.51)
EOSINOPHIL NFR BLD: 1.9 % (ref 0–6.9)
ERYTHROCYTE [DISTWIDTH] IN BLOOD BY AUTOMATED COUNT: 48.9 FL (ref 35.9–50)
GFR SERPLBLD CREATININE-BSD FMLA CKD-EPI: 98 ML/MIN/1.73 M 2
GLOBULIN SER CALC-MCNC: 2.7 G/DL (ref 1.9–3.5)
GLUCOSE SERPL-MCNC: 263 MG/DL (ref 65–99)
GLUCOSE UR STRIP.AUTO-MCNC: >=1000 MG/DL
HCT VFR BLD AUTO: 48.4 % (ref 42–52)
HGB BLD-MCNC: 17.3 G/DL (ref 14–18)
IMM GRANULOCYTES # BLD AUTO: 0.02 K/UL (ref 0–0.11)
IMM GRANULOCYTES NFR BLD AUTO: 0.4 % (ref 0–0.9)
INR PPP: 1.01 (ref 0.87–1.13)
KETONES UR STRIP.AUTO-MCNC: NEGATIVE MG/DL
LEUKOCYTE ESTERASE UR QL STRIP.AUTO: NEGATIVE
LYMPHOCYTES # BLD AUTO: 1.09 K/UL (ref 1–4.8)
LYMPHOCYTES NFR BLD: 20.3 % (ref 22–41)
MCH RBC QN AUTO: 32.3 PG (ref 27–33)
MCHC RBC AUTO-ENTMCNC: 35.7 G/DL (ref 33.7–35.3)
MCV RBC AUTO: 90.5 FL (ref 81.4–97.8)
MICRO URNS: ABNORMAL
MONOCYTES # BLD AUTO: 0.4 K/UL (ref 0–0.85)
MONOCYTES NFR BLD AUTO: 7.5 % (ref 0–13.4)
NEUTROPHILS # BLD AUTO: 3.74 K/UL (ref 1.82–7.42)
NEUTROPHILS NFR BLD: 69.7 % (ref 44–72)
NITRITE UR QL STRIP.AUTO: NEGATIVE
NRBC # BLD AUTO: 0 K/UL
NRBC BLD-RTO: 0 /100 WBC
PH UR STRIP.AUTO: 6 [PH] (ref 5–8)
PLATELET # BLD AUTO: 111 K/UL (ref 164–446)
PMV BLD AUTO: 10.7 FL (ref 9–12.9)
POTASSIUM SERPL-SCNC: 4.4 MMOL/L (ref 3.6–5.5)
PROT SERPL-MCNC: 6.7 G/DL (ref 6–8.2)
PROT UR QL STRIP: NEGATIVE MG/DL
PROTHROMBIN TIME: 13.2 SEC (ref 12–14.6)
RBC # BLD AUTO: 5.35 M/UL (ref 4.7–6.1)
RBC UR QL AUTO: NEGATIVE
SODIUM SERPL-SCNC: 133 MMOL/L (ref 135–145)
SP GR UR STRIP.AUTO: 1.01
WBC # BLD AUTO: 5.4 K/UL (ref 4.8–10.8)

## 2023-03-04 PROCEDURE — 85730 THROMBOPLASTIN TIME PARTIAL: CPT

## 2023-03-04 PROCEDURE — 51798 US URINE CAPACITY MEASURE: CPT

## 2023-03-04 PROCEDURE — 80053 COMPREHEN METABOLIC PANEL: CPT

## 2023-03-04 PROCEDURE — 81003 URINALYSIS AUTO W/O SCOPE: CPT

## 2023-03-04 PROCEDURE — 85610 PROTHROMBIN TIME: CPT

## 2023-03-04 PROCEDURE — RXMED WILLOW AMBULATORY MEDICATION CHARGE: Performed by: EMERGENCY MEDICINE

## 2023-03-04 PROCEDURE — 85025 COMPLETE CBC W/AUTO DIFF WBC: CPT

## 2023-03-04 PROCEDURE — 99284 EMERGENCY DEPT VISIT MOD MDM: CPT

## 2023-03-04 PROCEDURE — 36415 COLL VENOUS BLD VENIPUNCTURE: CPT

## 2023-03-04 RX ORDER — TAMSULOSIN HYDROCHLORIDE 0.4 MG/1
0.4 CAPSULE ORAL
Qty: 30 CAPSULE | Refills: 0 | Status: SHIPPED | OUTPATIENT
Start: 2023-03-04 | End: 2023-08-21 | Stop reason: SDUPTHER

## 2023-03-04 RX ORDER — TAMSULOSIN HYDROCHLORIDE 0.4 MG/1
0.4 CAPSULE ORAL
Qty: 30 CAPSULE | Refills: 2 | Status: SHIPPED | OUTPATIENT
Start: 2023-03-04 | End: 2023-03-04 | Stop reason: SDUPTHER

## 2023-03-04 ASSESSMENT — LIFESTYLE VARIABLES
TOTAL SCORE: 0
HAVE PEOPLE ANNOYED YOU BY CRITICIZING YOUR DRINKING: NO
ON A TYPICAL DAY WHEN YOU DRINK ALCOHOL HOW MANY DRINKS DO YOU HAVE: 0
EVER FELT BAD OR GUILTY ABOUT YOUR DRINKING: NO
CONSUMPTION TOTAL: NEGATIVE
AVERAGE NUMBER OF DAYS PER WEEK YOU HAVE A DRINK CONTAINING ALCOHOL: 0
TOTAL SCORE: 0
HAVE YOU EVER FELT YOU SHOULD CUT DOWN ON YOUR DRINKING: NO
TOTAL SCORE: 0
HOW MANY TIMES IN THE PAST YEAR HAVE YOU HAD 5 OR MORE DRINKS IN A DAY: 0
DO YOU DRINK ALCOHOL: NO
EVER HAD A DRINK FIRST THING IN THE MORNING TO STEADY YOUR NERVES TO GET RID OF A HANGOVER: NO

## 2023-03-04 ASSESSMENT — FIBROSIS 4 INDEX: FIB4 SCORE: 1.29

## 2023-03-04 NOTE — DISCHARGE INSTRUCTIONS
Follow-up with primary care.  If you do not have a primary care doctor I will have our  reach out to you to make a primary care appointment.  Follow-up with urology, Dr. Miner for difficulty urinating.  Follow-up with anticoagulation clinic to manage your anticoagulation.  Return to the ER for new medical problems or complaints.

## 2023-03-04 NOTE — ED PROVIDER NOTES
ED Provider Note    CHIEF COMPLAINT  Chief Complaint   Patient presents with    Medication Refill       EXTERNAL RECORDS REVIEWED  Previous ED records and outpatient labs for comparison.    HPI/ROS  LIMITATION TO HISTORY   Select: : None  OUTSIDE HISTORIAN(S):  None    Ajit Gregory is a 62 y.o. male who presents to the emergency department requesting a refill of Xarelto.  The patient states he has a history of a DVT and a blood clot in the vein in his abdomen.  He is supposed to be on Xarelto but is not taking it for several months.  He states he is concerned because he feels like he could die if he has another blood clot.  His only other complaint is urinary retention.  Patient states he cannot pee very well after straining to pee and some going on for some time and this also makes him concerned.  He is trying to establish with a primary care doctor but has been unable to do so.  Does not have a regular doctor.    The patient denies any chest pain or shortness of breath.  Denies any pain or swelling his legs.  Denies any fevers or chills.  No dysuria.  Denies any other acute concerns or complaints.    PAST MEDICAL HISTORY   has a past medical history of Diabetes ([facility]) and DVT (deep venous thrombosis) ([facility]).    SURGICAL HISTORY  patient denies any surgical history    FAMILY HISTORY  Family History   Problem Relation Age of Onset    No Known Problems Mother     No Known Problems Father        SOCIAL HISTORY  Social History     Tobacco Use    Smoking status: Every Day     Types: Cigars    Smokeless tobacco: Never   Vaping Use    Vaping Use: Never used   Substance and Sexual Activity    Alcohol use: Not Currently    Drug use: No    Sexual activity: Not on file       CURRENT MEDICATIONS  Home Medications    **Home medications have not yet been reviewed for this encounter**         ALLERGIES  No Known Allergies    PHYSICAL EXAM  VITAL SIGNS: BP (!) 154/93   Pulse 83   Temp 36.8 °C (98.2 °F) (Temporal)   Resp  "16   Ht 1.93 m (6' 4\")   Wt 112 kg (246 lb 14.6 oz)   SpO2 97%   BMI 30.06 kg/m²    Constitutional: Well developed, Well nourished, No acute distress, Non-toxic appearance.   HENT: Normocephalic, Atraumatic,  Eyes: PERRL, EOMI, Conjunctiva normal, No discharge.   Neck: Normal range of motion, No tenderness, Supple, No stridor.   Cardiovascular: Normal heart rate, Normal rhythm, No murmurs, No rubs, No gallops.   Thorax & Lungs: Normal breath sounds, No respiratory distress, No wheezing,  Abdomen: Bowel sounds normal, Soft, No tenderness  Skin: Warm, Dry, No erythema, No rash.   Musculoskeletal: Good range of motion in all major joints.  No asymmetric edema  Neurologic: Alert,No focal deficits noted.   Psychiatric: Affect normal      DIAGNOSTIC STUDIES / PROCEDURES    Results for orders placed or performed during the hospital encounter of 03/04/23   CBC WITH DIFFERENTIAL   Result Value Ref Range    WBC 5.4 4.8 - 10.8 K/uL    RBC 5.35 4.70 - 6.10 M/uL    Hemoglobin 17.3 14.0 - 18.0 g/dL    Hematocrit 48.4 42.0 - 52.0 %    MCV 90.5 81.4 - 97.8 fL    MCH 32.3 27.0 - 33.0 pg    MCHC 35.7 (H) 33.7 - 35.3 g/dL    RDW 48.9 35.9 - 50.0 fL    Platelet Count 111 (L) 164 - 446 K/uL    MPV 10.7 9.0 - 12.9 fL    Neutrophils-Polys 69.70 44.00 - 72.00 %    Lymphocytes 20.30 (L) 22.00 - 41.00 %    Monocytes 7.50 0.00 - 13.40 %    Eosinophils 1.90 0.00 - 6.90 %    Basophils 0.20 0.00 - 1.80 %    Immature Granulocytes 0.40 0.00 - 0.90 %    Nucleated RBC 0.00 /100 WBC    Neutrophils (Absolute) 3.74 1.82 - 7.42 K/uL    Lymphs (Absolute) 1.09 1.00 - 4.80 K/uL    Monos (Absolute) 0.40 0.00 - 0.85 K/uL    Eos (Absolute) 0.10 0.00 - 0.51 K/uL    Baso (Absolute) 0.01 0.00 - 0.12 K/uL    Immature Granulocytes (abs) 0.02 0.00 - 0.11 K/uL    NRBC (Absolute) 0.00 K/uL   COMP METABOLIC PANEL   Result Value Ref Range    Sodium 133 (L) 135 - 145 mmol/L    Potassium 4.4 3.6 - 5.5 mmol/L    Chloride 102 96 - 112 mmol/L    Co2 23 20 - 33 mmol/L    " Anion Gap 8.0 7.0 - 16.0    Glucose 263 (H) 65 - 99 mg/dL    Bun 7 (L) 8 - 22 mg/dL    Creatinine 0.88 0.50 - 1.40 mg/dL    Calcium 8.8 8.4 - 10.2 mg/dL    AST(SGOT) 16 12 - 45 U/L    ALT(SGPT) 29 2 - 50 U/L    Alkaline Phosphatase 155 (H) 30 - 99 U/L    Total Bilirubin 0.6 0.1 - 1.5 mg/dL    Albumin 4.0 3.2 - 4.9 g/dL    Total Protein 6.7 6.0 - 8.2 g/dL    Globulin 2.7 1.9 - 3.5 g/dL    A-G Ratio 1.5 g/dL   URINALYSIS CULTURE, IF INDICATED    Specimen: Urine, Clean Catch; Blood   Result Value Ref Range    Color Yellow     Character Clear     Specific Gravity 1.010 <1.035    Ph 6.0 5.0 - 8.0    Glucose >=1000 (A) Negative mg/dL    Ketones Negative Negative mg/dL    Protein Negative Negative mg/dL    Bilirubin Negative Negative    Nitrite Negative Negative    Leukocyte Esterase Negative Negative    Occult Blood Negative Negative    Micro Urine Req see below    APTT   Result Value Ref Range    APTT 28.7 24.7 - 36.0 sec   PROTHROMBIN TIME (INR)   Result Value Ref Range    PT 13.2 12.0 - 14.6 sec    INR 1.01 0.87 - 1.13   CORRECTED CALCIUM   Result Value Ref Range    Correct Calcium 8.8 8.5 - 10.5 mg/dL   ESTIMATED GFR   Result Value Ref Range    GFR (CKD-EPI) 98 >60 mL/min/1.73 m 2        RADIOLOGY  I have independently interpreted the diagnostic imaging associated with this visit and am waiting the final reading from the radiologist.   My preliminary interpretation is as follows: none  Radiologist interpretation:   No orders to display         COURSE & MEDICAL DECISION MAKING    ED Observation Status? Yes; I am placing the patient in to an observation status due to a diagnostic uncertainty as well as therapeutic intensity. Patient placed in observation status at 9:51 AM, 3/4/2023.     Observation plan is as follows: Patient is placed in ED observation will be evaluate his labs and urinary retention.    Upon Reevaluation, the patient's condition has: Improved; and will be discharged.    Patient discharged from ED  Observation status at 1220 (Time) 3/4 (Date).     INITIAL ASSESSMENT, COURSE AND PLAN  Care Narrative:     The patient presents to the emergency department for evaluation of difficulty urinating and requesting a medication refill for blood thinner as well as refill of Flomax.    The patient has a history of multiple blood clots.  He has had 2 blood clots 1 DVT in 1 mesenteric vein thrombus.  He supposed to be on blood thinners.  Not really sure how long he has somewhat been lost to follow-up.  He does not have any symptoms today but like to resume his blood thinner until he can follow-up.    Chart was reviewed he has no recent labs.  Unsure if he has renal problems or other contraindication to blood thinners so I have suggested repeat labs.    Renal function is fine.  Blood sugar is little bit high is counseled to follow-up for this.  CBC is normal.    Discussed his case with the pharmacist Mattie and she recommends just restart him at regular dose and not the twice daily dosing for not treating an acute clot but just resuming chronic anticoagulation.  Recommends referral to anticoagulation clinic which I have placed.    Patient is aware of this to return for signs of bleeding or other concerns.  Has no risks or signs of bleeding at this time.  Questions were answered he is agreeable to plan.    The patient states he is having a hard time emptying his bladder.  Postvoid residual with my ultrasound shows very minimal urine postvoid residual bladder scan produces a number of around 35 mL.    Urinalysis is normal.    Refer to primary care.  I have asked our  to try to arrange primary care follow-up for him.  The patient referred back to his doctor if he is able to see him.  For his symptoms of BPH and difficulty urinating I refilled his Flomax and referred him to the urologist and counseled him about the need to follow-up.  He is referred to anticoagulation services for his anticoagulation again back to primary  care.    At this point his questions were answered, he seems stable for discharge.  He is agreeable to plan and discharged in good condition.    Case coordination is confirmed that he can get his medications and we have arranged for him to pick his up at a local pharmacy.        HTN/IDDM FOLLOW UP:  The patient is referred to a primary physician for blood pressure management, diabetic screening, and for all other preventive health concerns      ADDITIONAL PROBLEM LIST  BPH  Possible diabetes  History of multiple blood clots.  DISPOSITION AND DISCUSSIONS  I have discussed management of the patient with the following physicians and BOZENA's: None    Discussion of management with other Q or appropriate source(s): Pharmacist Mattie, case management Stacey    Escalation of care considered, and ultimately not performed: Considered ultrasound but determined not necessary without symptoms.    Barriers to care at this time, including but not limited to: Patient does not have established PCP and Patient does not have insurance.       Ron Edouard M.D.  [address]  [place] [address]  [phone]          Andrew Miller M.D.  [address]  [place] [address]  [phone]    Schedule an appointment as soon as possible for a visit in 1 week          FINAL DIAGNOSIS  1. Medication refill    2. VTE (venous thromboembolism)    3. Urinary hesitancy    4. Superior mesenteric vein thrombosis (HCC)           Electronically signed by: Keven Morin M.D., 3/4/2023 9:49 AM

## 2023-03-04 NOTE — DISCHARGE PLANNING
note:  Gini at [facility] informed CM that Xarelto is not covered. Recommendation is for Eliqughazala however MD said that pt was on Xarelto so he prefers not to change the prescription.     CM called [facility] and CM will pay for the prescription under approved services $7.57 for 7 days. CM will work on PA.

## 2023-03-04 NOTE — ED NOTES
Pt is able to rest in no distress.  He denies any new needs. We are unable to collect urine sample presently.  Pt is aware of importance to obtain specimen when possible and to call for assistance prior to voiding.  Clean catch procedure has been reviewed; pt verbalizes understanding.

## 2023-03-04 NOTE — DISCHARGE PLANNING
Approved services faxed to [facility] for Xarelto $7.57.     Verified with Dr Morin that there are two prescriptions.

## 2023-03-04 NOTE — DISCHARGE PLANNING
note:  Pt being discharged with Xarelto.  Called pharmacist at [facility] and pharmacist said that insurance is saying non formulary.    CM asked MD to send prescriptions to [facility].

## 2023-03-04 NOTE — ED NOTES
"Presents requesting refill for Xarelto.  He reports a hx of recurring DVT affecting his left leg.  He has been non compliant with his prescription for the past 2 months.  Chief Complaint   Patient presents with    Medication Refill     BP (!) 154/93   Pulse 83   Temp 36.8 °C (98.2 °F) (Temporal)   Resp 16   Ht 1.93 m (6' 4\")   Wt 112 kg (246 lb 14.6 oz)   SpO2 97%   BMI 30.06 kg/m²   Has this patient been vaccinated for COVID NO  If not, would they like to be vaccinated while in the ER if eligible?  NO  Would the patient like to speak with the ERP about the possibility of receiving the COVID vaccine today before making a decision? NO    "

## 2023-03-05 NOTE — DISCHARGE PLANNING
Anticipated Discharge Disposition: Home    Action: Call to Gini at [facility] that approval received 3.5.23 to 3.5.24. She is able to confirm and run Sent to [facility] to scan to chart as well. PA 23 [phone] Pharmacy will update pt  as well. ER CM  called [phone] as well. He will work on transfering to [facility] in future, ER CM will update [facility] on transfer.    Barriers to Discharge: None    Plan: No further needs

## 2023-03-05 NOTE — DISCHARGE PLANNING
Covermymeds PA failed.  Hard copy fax for PA faxed to [facility] faxed #[phone]. CM will follow.

## 2023-03-06 ENCOUNTER — TELEPHONE (OUTPATIENT)
Dept: VASCULAR LAB | Facility: MEDICAL CENTER | Age: 63
End: 2023-03-06
Payer: COMMERCIAL

## 2023-03-06 NOTE — TELEPHONE ENCOUNTER
[facility]    Received anticoagulation referral     Pt previously declined our services in 2021.  S/w pt - he will be establishing w/ a new PCP and prefers to defer management to them but would like us to call him back next week     Insurance: Workers Comp  PCP: none  Locations to be seen: [address]    [facility] at [phone], fax [phone]    Sammi Tuttle, KrishnaD

## 2023-03-11 ENCOUNTER — PHARMACY VISIT (OUTPATIENT)
Dept: PHARMACY | Facility: MEDICAL CENTER | Age: 63
End: 2023-03-11
Payer: MEDICARE

## 2023-03-11 PROCEDURE — RXMED WILLOW AMBULATORY MEDICATION CHARGE: Performed by: EMERGENCY MEDICINE

## 2023-03-13 ENCOUNTER — DOCUMENTATION (OUTPATIENT)
Dept: VASCULAR LAB | Facility: MEDICAL CENTER | Age: 63
End: 2023-03-13
Payer: COMMERCIAL

## 2023-03-13 NOTE — PROGRESS NOTES
[facility]         Received anticoagulation referral for Xarelto.     Pt previously declined our services in 2021.  Patient will be establishing w/ a new PCP on 3/17/23, and prefers to defer management to them but would like us to call him back next week to be sure.      Insurance: Workers Comp  PCP: none  Locations to be seen: [facility] at [phone], fax [phone]    Suma KellerD

## 2023-03-16 SDOH — HEALTH STABILITY: PHYSICAL HEALTH: ON AVERAGE, HOW MANY DAYS PER WEEK DO YOU ENGAGE IN MODERATE TO STRENUOUS EXERCISE (LIKE A BRISK WALK)?: 2 DAYS

## 2023-03-16 SDOH — HEALTH STABILITY: PHYSICAL HEALTH: ON AVERAGE, HOW MANY MINUTES DO YOU ENGAGE IN EXERCISE AT THIS LEVEL?: 60 MIN

## 2023-03-16 SDOH — ECONOMIC STABILITY: INCOME INSECURITY: HOW HARD IS IT FOR YOU TO PAY FOR THE VERY BASICS LIKE FOOD, HOUSING, MEDICAL CARE, AND HEATING?: VERY HARD

## 2023-03-16 SDOH — ECONOMIC STABILITY: TRANSPORTATION INSECURITY
IN THE PAST 12 MONTHS, HAS THE LACK OF TRANSPORTATION KEPT YOU FROM MEDICAL APPOINTMENTS OR FROM GETTING MEDICATIONS?: NO

## 2023-03-16 SDOH — ECONOMIC STABILITY: HOUSING INSECURITY
IN THE LAST 12 MONTHS, WAS THERE A TIME WHEN YOU DID NOT HAVE A STEADY PLACE TO SLEEP OR SLEPT IN A SHELTER (INCLUDING NOW)?: NO

## 2023-03-16 SDOH — ECONOMIC STABILITY: FOOD INSECURITY: WITHIN THE PAST 12 MONTHS, YOU WORRIED THAT YOUR FOOD WOULD RUN OUT BEFORE YOU GOT MONEY TO BUY MORE.: SOMETIMES TRUE

## 2023-03-16 SDOH — HEALTH STABILITY: MENTAL HEALTH
STRESS IS WHEN SOMEONE FEELS TENSE, NERVOUS, ANXIOUS, OR CAN'T SLEEP AT NIGHT BECAUSE THEIR MIND IS TROUBLED. HOW STRESSED ARE YOU?: ONLY A LITTLE

## 2023-03-16 SDOH — ECONOMIC STABILITY: TRANSPORTATION INSECURITY
IN THE PAST 12 MONTHS, HAS LACK OF RELIABLE TRANSPORTATION KEPT YOU FROM MEDICAL APPOINTMENTS, MEETINGS, WORK OR FROM GETTING THINGS NEEDED FOR DAILY LIVING?: YES

## 2023-03-16 SDOH — ECONOMIC STABILITY: HOUSING INSECURITY: IN THE LAST 12 MONTHS, HOW MANY PLACES HAVE YOU LIVED?: 1

## 2023-03-16 SDOH — ECONOMIC STABILITY: INCOME INSECURITY: IN THE LAST 12 MONTHS, WAS THERE A TIME WHEN YOU WERE NOT ABLE TO PAY THE MORTGAGE OR RENT ON TIME?: YES

## 2023-03-16 SDOH — ECONOMIC STABILITY: FOOD INSECURITY: WITHIN THE PAST 12 MONTHS, THE FOOD YOU BOUGHT JUST DIDN'T LAST AND YOU DIDN'T HAVE MONEY TO GET MORE.: SOMETIMES TRUE

## 2023-03-16 SDOH — ECONOMIC STABILITY: TRANSPORTATION INSECURITY
IN THE PAST 12 MONTHS, HAS LACK OF TRANSPORTATION KEPT YOU FROM MEETINGS, WORK, OR FROM GETTING THINGS NEEDED FOR DAILY LIVING?: YES

## 2023-03-16 SDOH — ECONOMIC STABILITY: HOUSING INSECURITY
IN THE LAST 12 MONTHS, WAS THERE A TIME WHEN YOU DID NOT HAVE A STEADY PLACE TO SLEEP OR SLEPT IN A SHELTER (INCLUDING NOW)?: YES

## 2023-03-16 ASSESSMENT — SOCIAL DETERMINANTS OF HEALTH (SDOH)
HOW OFTEN DO YOU ATTENT MEETINGS OF THE CLUB OR ORGANIZATION YOU BELONG TO?: NEVER
HOW OFTEN DO YOU GET TOGETHER WITH FRIENDS OR RELATIVES?: PATIENT DECLINED
HOW OFTEN DO YOU ATTENT MEETINGS OF THE CLUB OR ORGANIZATION YOU BELONG TO?: NEVER
HOW OFTEN DO YOU GET TOGETHER WITH FRIENDS OR RELATIVES?: PATIENT DECLINED
WITHIN THE PAST 12 MONTHS, YOU WORRIED THAT YOUR FOOD WOULD RUN OUT BEFORE YOU GOT THE MONEY TO BUY MORE: SOMETIMES TRUE
DO YOU BELONG TO ANY CLUBS OR ORGANIZATIONS SUCH AS CHURCH GROUPS UNIONS, FRATERNAL OR ATHLETIC GROUPS, OR SCHOOL GROUPS?: NO
HOW OFTEN DO YOU ATTEND CHURCH OR RELIGIOUS SERVICES?: NEVER
HOW OFTEN DO YOU HAVE A DRINK CONTAINING ALCOHOL: MONTHLY OR LESS
HOW OFTEN DO YOU HAVE SIX OR MORE DRINKS ON ONE OCCASION: NEVER
DO YOU BELONG TO ANY CLUBS OR ORGANIZATIONS SUCH AS CHURCH GROUPS UNIONS, FRATERNAL OR ATHLETIC GROUPS, OR SCHOOL GROUPS?: NO
HOW MANY DRINKS CONTAINING ALCOHOL DO YOU HAVE ON A TYPICAL DAY WHEN YOU ARE DRINKING: 1 OR 2
IN A TYPICAL WEEK, HOW MANY TIMES DO YOU TALK ON THE PHONE WITH FAMILY, FRIENDS, OR NEIGHBORS?: MORE THAN THREE TIMES A WEEK
HOW OFTEN DO YOU ATTEND CHURCH OR RELIGIOUS SERVICES?: NEVER
HOW HARD IS IT FOR YOU TO PAY FOR THE VERY BASICS LIKE FOOD, HOUSING, MEDICAL CARE, AND HEATING?: VERY HARD
IN A TYPICAL WEEK, HOW MANY TIMES DO YOU TALK ON THE PHONE WITH FAMILY, FRIENDS, OR NEIGHBORS?: MORE THAN THREE TIMES A WEEK

## 2023-03-16 ASSESSMENT — LIFESTYLE VARIABLES
HOW MANY STANDARD DRINKS CONTAINING ALCOHOL DO YOU HAVE ON A TYPICAL DAY: 1 OR 2
HOW OFTEN DO YOU HAVE SIX OR MORE DRINKS ON ONE OCCASION: NEVER
HOW OFTEN DO YOU HAVE A DRINK CONTAINING ALCOHOL: MONTHLY OR LESS
SKIP TO QUESTIONS 9-10: 1
AUDIT-C TOTAL SCORE: 1

## 2023-03-17 ENCOUNTER — OFFICE VISIT (OUTPATIENT)
Dept: INTERNAL MEDICINE | Facility: OTHER | Age: 63
End: 2023-03-17
Payer: COMMERCIAL

## 2023-03-17 VITALS
HEIGHT: 76 IN | WEIGHT: 247.4 LBS | DIASTOLIC BLOOD PRESSURE: 78 MMHG | OXYGEN SATURATION: 97 % | BODY MASS INDEX: 30.13 KG/M2 | SYSTOLIC BLOOD PRESSURE: 132 MMHG | TEMPERATURE: 98.2 F | HEART RATE: 88 BPM

## 2023-03-17 DIAGNOSIS — Z13.228 SCREENING FOR METABOLIC DISORDER: ICD-10-CM

## 2023-03-17 DIAGNOSIS — D69.1 THROMBOCYTOPATHIA (HCC): ICD-10-CM

## 2023-03-17 DIAGNOSIS — Z11.59 NEED FOR HEPATITIS C SCREENING TEST: ICD-10-CM

## 2023-03-17 DIAGNOSIS — Z11.3 ROUTINE SCREENING FOR STI (SEXUALLY TRANSMITTED INFECTION): ICD-10-CM

## 2023-03-17 DIAGNOSIS — Z12.12 SCREENING FOR COLORECTAL CANCER: ICD-10-CM

## 2023-03-17 DIAGNOSIS — Z12.11 SCREENING FOR COLORECTAL CANCER: ICD-10-CM

## 2023-03-17 DIAGNOSIS — Z00.00 HEALTHCARE MAINTENANCE: ICD-10-CM

## 2023-03-17 DIAGNOSIS — R73.03 PREDIABETES: ICD-10-CM

## 2023-03-17 DIAGNOSIS — E66.9 OBESITY (BMI 30-39.9): ICD-10-CM

## 2023-03-17 DIAGNOSIS — Z87.891 HISTORY OF TOBACCO ABUSE: ICD-10-CM

## 2023-03-17 PROBLEM — M46.1 SACROILIITIS, NOT ELSEWHERE CLASSIFIED (HCC): Status: ACTIVE | Noted: 2023-03-17

## 2023-03-17 PROBLEM — M54.16 LUMBAR RADICULOPATHY: Status: ACTIVE | Noted: 2023-03-17

## 2023-03-17 PROCEDURE — 99204 OFFICE O/P NEW MOD 45 MIN: CPT | Mod: GC

## 2023-03-17 ASSESSMENT — ENCOUNTER SYMPTOMS
CONSTIPATION: 0
VOMITING: 0
WHEEZING: 0
BLURRED VISION: 0
NAUSEA: 0
ABDOMINAL PAIN: 0
SEIZURES: 0
HEARTBURN: 0
CHILLS: 0
DIZZINESS: 0
NERVOUS/ANXIOUS: 0
FEVER: 0
DEPRESSION: 0
COUGH: 0
BLOOD IN STOOL: 0
SHORTNESS OF BREATH: 0
FALLS: 0
LOSS OF CONSCIOUSNESS: 0

## 2023-03-17 ASSESSMENT — FIBROSIS 4 INDEX: FIB4 SCORE: 1.63

## 2023-03-17 ASSESSMENT — PATIENT HEALTH QUESTIONNAIRE - PHQ9: CLINICAL INTERPRETATION OF PHQ2 SCORE: 0

## 2023-03-17 NOTE — ASSESSMENT & PLAN NOTE
Patient has a history of prediabetes with a A1c of 6.0% on 2021.  Has not had repeat A1c since.  ED visit on 3/4 remarkable for BG of 263 glucosuria of 1000    -Ordered CMP and A1c to assess current status  -Reevaluate next visit

## 2023-03-17 NOTE — ASSESSMENT & PLAN NOTE
-Ordered Lipid profile to screen for dyslipidemia  -Ordered routine screening (HIV, syphilis)  -Ordered hepatitis C screening (antibodies)

## 2023-03-17 NOTE — ASSESSMENT & PLAN NOTE
Patient has a 60-pack-year history, quit at age 39.  However still smoking marijuana blunts twice a week, which contain tobacco in the wraps    -Ordered low-dose chest CT to screen for malignancy  -Reassess next visit

## 2023-03-17 NOTE — ASSESSMENT & PLAN NOTE
Has a history of provoked thromboses consisting of DVT of LLE in 2020 and superior mesenteric vein thrombosis in 2021.  Patient was sedentary at those times due to being on disability from lumbar back pain 2/2 degenerative disease and spondylolysis.  Has not been worked up for thrombophilia.  Interestingly, noted to have platelets of 111 when seen in ED on 3/4, and also reinitiated on Xarelto after being off of it for 7-8 months.     -Advised to continue Xarelto 20 mg p.o. every night  -Ordered repeat CBC to reassess platelet levels  -Factor V Leiden and prothrombin [id number] hypercoagulability work-up  -Order low-dose chest CT and refered to GI for colonoscopy to screen for malignancy  -Reevaluate next visit

## 2023-03-17 NOTE — PROGRESS NOTES
Ajit Gregory is a 62 y.o. male who presents today with the following:    CC: Establish Care with Primary Care Physician Kalyan Taylor D.O.    HPI:  Patient here to establish care with our clinic.  He is doing well, without any new complaints at this time.  Patient has a history of LLE DVT in 2020, followed by superior mesenteric vein thrombosis in 2021.  Patient was sedentary during this time due to being on disability because of lumbar back pain.  He states he had falls prior to both events, and believes those were inciting factors.  He was also recently off Xarelto for the past 7-8 months, finally was reinitiated in the ED on 3/4/2023.  Patient has not been worked up for hypercoagulability.  Last colonoscopy was 10 years ago.  He has otherwise been increasing physical activity, biking 2 miles on a stationary bike with weightlifting at least twice a week.  His diet however has been inconsistent (fruits, burritos, hot dogs), as he does not have a kitchen (currently lives in a motel).  He is a retired , and former 60-pack-year smoker, quit at age 39.    Review of Systems   Constitutional:  Negative for chills and fever.   HENT:  Negative for hearing loss.    Eyes:  Negative for blurred vision.   Respiratory:  Negative for cough, shortness of breath and wheezing.    Cardiovascular:  Negative for chest pain and leg swelling.   Gastrointestinal:  Negative for abdominal pain, blood in stool, constipation, heartburn, nausea and vomiting.   Genitourinary:  Negative for dysuria and hematuria.   Musculoskeletal:  Negative for falls.   Neurological:  Negative for dizziness, seizures and loss of consciousness.   Psychiatric/Behavioral:  Negative for depression. The patient is not nervous/anxious.      Past Medical History:   Diagnosis Date    Diabetes (HCC)     DVT (deep venous thrombosis) (HCC)      Past Surgical History:   Procedure Laterality Date    FUSION, SPINE, LUMBAR, PLIF  2022     "CERVICAL DISK AND FUSION ANTERIOR  2004     Family History   Problem Relation Age of Onset    Breast Cancer Mother      Social History     Tobacco Use    Smoking status: Former     Packs/day: 2.00     Years: 35.00     Pack years: 70.00     Types: Cigarettes    Smokeless tobacco: Never   Vaping Use    Vaping Use: Never used   Substance Use Topics    Alcohol use: Not Currently    Drug use: Yes     Types: Marijuana     Comment: Marijuana x2/ week     Current Outpatient Medications   Medication Sig Dispense Refill    rivaroxaban (XARELTO) 20 MG Tab tablet Take 1 Tablet by mouth with dinner. 30 Tablet 0    tamsulosin (FLOMAX) 0.4 MG capsule Take 1 Capsule by mouth 1/2 hour after breakfast. 30 Capsule 0     No current facility-administered medications for this visit.       /78 (BP Location: Left arm, Patient Position: Sitting, BP Cuff Size: Adult)   Pulse 88   Temp 36.8 °C (98.2 °F) (Temporal)   Ht 1.93 m (6' 4\")   Wt 112 kg (247 lb 6.4 oz)   SpO2 97%   BMI 30.11 kg/m²   Physical Exam  Constitutional:       General: He is not in acute distress.     Appearance: Normal appearance.   HENT:      Head: Normocephalic and atraumatic.   Eyes:      Extraocular Movements: Extraocular movements intact.      Conjunctiva/sclera: Conjunctivae normal.   Cardiovascular:      Rate and Rhythm: Normal rate and regular rhythm.      Pulses: Normal pulses.      Heart sounds: Normal heart sounds. No murmur heard.    No friction rub. No gallop.   Pulmonary:      Effort: Pulmonary effort is normal.      Breath sounds: Normal breath sounds. No wheezing, rhonchi or rales.   Abdominal:      General: Abdomen is flat. Bowel sounds are normal. There is no distension.      Palpations: Abdomen is soft.      Tenderness: There is no abdominal tenderness. There is no guarding or rebound.   Musculoskeletal:         General: No swelling, tenderness or deformity.      Right lower leg: No edema.      Left lower leg: No edema.   Skin:     General: " Skin is warm.   Neurological:      General: No focal deficit present.      Mental Status: He is alert and oriented to person, place, and time.      Cranial Nerves: No cranial nerve deficit.      Sensory: No sensory deficit.   Psychiatric:         Mood and Affect: Mood normal.         Behavior: Behavior normal.       Assessment and Plan:     Thrombocytopathia (HCC)  Has a history of provoked thromboses consisting of DVT of LLE in 2020 and superior mesenteric vein thrombosis in 2021.  Patient was sedentary at those times due to being on disability from lumbar back pain 2/2 degenerative disease and spondylolysis.  Has not been worked up for thrombophilia.  Interestingly, noted to have platelets of 111 when seen in ED on 3/4, and also reinitiated on Xarelto after being off of it for 7-8 months.     -Advised to continue Xarelto 20 mg p.o. every night  -Ordered repeat CBC to reassess platelet levels  -Factor V Leiden and prothrombin [id number] hypercoagulability work-up  -Order low-dose chest CT and refered to GI for colonoscopy to screen for malignancy  -Reevaluate next visit    Prediabetes  Patient has a history of prediabetes with a A1c of 6.0% on 2021.  Has not had repeat A1c since.  ED visit on 3/4 remarkable for BG of 263 glucosuria of 1000    -Ordered CMP and A1c to assess current status  -Reevaluate next visit    History of tobacco abuse  Patient has a 60-pack-year history, quit at age 39.  However still smoking marijuana blunts twice a week, which contain tobacco in the wraps    -Ordered low-dose chest CT to screen for malignancy  -Reassess next visit    Healthcare maintenance  -Ordered Lipid profile to screen for dyslipidemia  -Ordered routine screening (HIV, syphilis)  -Ordered hepatitis C screening (antibodies)      Orders Placed This Encounter    CT-CHEST LOW DOSE W/O    Comp Metabolic Panel    Lipid Profile    HEMOGLOBIN A1C    CBC WITH DIFFERENTIAL    HIV AG/AB COMBO ASSAY SCREENING    RPR (SYPHILIS)    HEP C  VIRUS ANTIBODY    FACTOR V LEIDEN MUTATION    MISCELLANEOUS LAB TEST ([facility]/Other)    Referral to GI for Colonoscopy    Patient identified as having weight management issue.  Appropriate orders and counseling given.       Return in about 6 weeks (around 4/28/2023).    Signed by: JACKIE Barnes, MARYSEYI, Internal Medicine  [facility]

## 2023-03-20 ENCOUNTER — TELEPHONE (OUTPATIENT)
Dept: VASCULAR LAB | Facility: MEDICAL CENTER | Age: 63
End: 2023-03-20
Payer: COMMERCIAL

## 2023-03-20 NOTE — TELEPHONE ENCOUNTER
S/w pt - PCP will manage anticoagulation.  Will remove pt from referral list.  Sammi Tuttle, PharmD

## 2023-05-12 DIAGNOSIS — K55.069 SUPERIOR MESENTERIC VEIN THROMBOSIS (HCC): ICD-10-CM

## 2023-05-12 NOTE — TELEPHONE ENCOUNTER
Patient called for his refill on Xarelto, he's asking if his Dr. Taylor could call his pharmacy to fill.    He only has two pills left and says it's urgent as it is a blood thinner.

## 2023-06-19 ENCOUNTER — OFFICE VISIT (OUTPATIENT)
Dept: INTERNAL MEDICINE | Facility: OTHER | Age: 63
End: 2023-06-19
Payer: COMMERCIAL

## 2023-06-19 VITALS
HEIGHT: 76 IN | SYSTOLIC BLOOD PRESSURE: 157 MMHG | BODY MASS INDEX: 28.25 KG/M2 | OXYGEN SATURATION: 94 % | DIASTOLIC BLOOD PRESSURE: 79 MMHG | HEART RATE: 94 BPM | TEMPERATURE: 97.8 F | WEIGHT: 232 LBS

## 2023-06-19 DIAGNOSIS — E11.65 UNCONTROLLED TYPE 2 DIABETES MELLITUS WITH HYPERGLYCEMIA (HCC): ICD-10-CM

## 2023-06-19 DIAGNOSIS — E11.9 CONTROLLED TYPE 2 DIABETES MELLITUS WITHOUT COMPLICATION, WITHOUT LONG-TERM CURRENT USE OF INSULIN (HCC): ICD-10-CM

## 2023-06-19 DIAGNOSIS — R73.09 ELEVATED GLUCOSE LEVEL: ICD-10-CM

## 2023-06-19 DIAGNOSIS — E11.9 TYPE 2 DIABETES MELLITUS WITHOUT COMPLICATION, WITHOUT LONG-TERM CURRENT USE OF INSULIN (HCC): ICD-10-CM

## 2023-06-19 DIAGNOSIS — I82.4Y9 ACUTE DEEP VEIN THROMBOSIS (DVT) OF PROXIMAL VEIN OF LOWER EXTREMITY, UNSPECIFIED LATERALITY (HCC): ICD-10-CM

## 2023-06-19 PROCEDURE — 99214 OFFICE O/P EST MOD 30 MIN: CPT | Mod: GC

## 2023-06-19 PROCEDURE — 3077F SYST BP >= 140 MM HG: CPT | Mod: GC

## 2023-06-19 PROCEDURE — 3078F DIAST BP <80 MM HG: CPT | Mod: GC

## 2023-06-19 RX ORDER — INSULIN GLARGINE 100 [IU]/ML
10 INJECTION, SOLUTION SUBCUTANEOUS EVERY EVENING
Qty: 3 ML | Refills: 0 | Status: SHIPPED | OUTPATIENT
Start: 2023-06-19 | End: 2023-06-30

## 2023-06-19 RX ORDER — LANCETS 30 GAUGE
EACH MISCELLANEOUS
Qty: 30 EACH | Refills: 3 | Status: SHIPPED | OUTPATIENT
Start: 2023-06-19 | End: 2023-07-18 | Stop reason: SDUPTHER

## 2023-06-19 RX ORDER — ATORVASTATIN CALCIUM 20 MG/1
20 TABLET, FILM COATED ORAL NIGHTLY
Qty: 30 TABLET | Refills: 3 | Status: SHIPPED | OUTPATIENT
Start: 2023-06-19 | End: 2023-07-28 | Stop reason: SDUPTHER

## 2023-06-19 RX ORDER — GLUCOSAMINE HCL/CHONDROITIN SU 500-400 MG
CAPSULE ORAL
Qty: 100 EACH | Refills: 0 | Status: SHIPPED | OUTPATIENT
Start: 2023-06-19 | End: 2023-09-01 | Stop reason: SDUPTHER

## 2023-06-19 ASSESSMENT — FIBROSIS 4 INDEX: FIB4 SCORE: 1.63

## 2023-06-19 NOTE — PROGRESS NOTES
Teaching Physician Attestation      Level of Participation    I have personally interviewed and examined the patient.  In addition, I discussed with the resident physician the patient's history, exam, assessment and plan in detail.  Topics listed in my addendum were the focus of the visit.  Healthcare maintenance was not addressed this visit unless listed as a topic in my addendum.  I agree with the plan as written along with the following additions/modifications:    New onset DM, type unclear  -Patient reports A1c of 12, on review of chart appears patient also had a blood sugar of 263 in March of this year, likely nonfasting.  Either way, patient meets criteria for diabetes.  He is also however a physically fit individual that is minimally overweight, however reports significant dietary discretions with sugar-containing drinks on a daily basis.    Recommendations:  -Begin metformin and titrate up.  Ideally would also go straight to insulin given highly elevated A1c,, although shared decision making per resident note.  -Start atorvastatin 20 mg daily, check lipid panel.  AST/ALT normal  -Check urine micro Beman to creatinine  -Counseled on annual eye and dental exams  -Discussed importance of foot exam, details per resident note  -Discussed offering PCV 20 today    Supportively counseled patient with situational stressors related to recent surgery/recovery, not otherwise addressed.    Need to clarify why patient is not on Xarelto, last clinic note indicates recommendation to be on Xarelto.    Return to clinic in 1 week

## 2023-06-19 NOTE — PATIENT INSTRUCTIONS
Labs done in next two weeks  Check on lab from [facility]  Start atorvastatin 20 mg  Start Metformin 500 mg once per day for seven days, then 500 mg twice per day for seven days, 1000 mg in morning (two pills) and 500 mg once per day for seven days, then 1000 mg in morning and 1000 mg in evening from then on  Write down fast blood sugars in notebook and then bring in visit in two weeks   Start lantus 10 units once per day, in evening

## 2023-06-19 NOTE — PROGRESS NOTES
Date of Service:  6/19/2023    CC: Elevated A1c reading prior to surgery    HPI:  Ajit Gregory  is a 62 y.o. male with a PMH of Deep vein thrombosis, sacroiliitis, history of tobacco use disorder, and abdominal pain. Patient was going to undergo a back surgery when he received word from his workman's comp representative that he has a 12.6% hemoglobin A1c.  Surgery was canceled. Patient was extremely concerned about the reading and immediately scheduled an appointment with the clinic.   Patient has noticed recently increased thirst. He is unsure if he has had increased urination because he takes tamsulosin for urinary retention. He has been trying to eat healthy but is currently in a living situation where he has a mini-fridge and a microwave. He has not been feeling well lately and has had increased fatigue. He has also been drinking more soda and sugary drinks. No reported changes in his vision, has had increased dryness in mouth. No burning, numbness or pain in his feet.   Patient has also stopped Xeralto for his DVTs.    Review of systems:  Review of Systems   Constitutional:  Positive for malaise/fatigue and weight loss. Negative for chills and fever.   HENT: Negative.     Eyes: Negative.  Negative for blurred vision and double vision.   Respiratory: Negative.     Cardiovascular: Negative.    Gastrointestinal: Negative.  Negative for nausea and vomiting.   Genitourinary:  Positive for frequency and urgency. Negative for dysuria, flank pain and hematuria.   Musculoskeletal:  Positive for back pain (Chronic after prior accident).   Skin: Negative.    Neurological:  Negative for dizziness, tingling, sensory change, focal weakness and headaches.   Psychiatric/Behavioral: Negative.          Past Medical History:  Patient Active Problem List    Diagnosis Date Noted    Type 2 diabetes mellitus without complication, without long-term current use of insulin ([facility]) 03/17/2023    Lumbar radiculopathy 03/17/2023     Sacroiliitis, not elsewhere classified ([facility]) 03/17/2023    Obesity (BMI 30-39.9) 03/17/2023    Healthcare maintenance 03/17/2023    Abdominal pain 09/23/2021    Constipation 09/23/2021    Thrombocytopathia (HCC) 09/22/2021    Superior mesenteric vein thrombosis (HCC) 09/22/2021    Urinary retention 09/22/2021    History of tobacco abuse 09/22/2021    Deep vein thrombosis (DVT) of lower extremity ([facility]) 12/03/2020       Past Surgical History:    has a past surgical history that includes cervical disk and fusion anterior (2004) and fusion, spine, lumbar, plif (2022).    Medications:  Current Outpatient Medications   Medication Sig Dispense Refill    magnesium citrate Solution Take 270 mL by mouth one time. gummies      metFORMIN (GLUCOPHAGE) 500 MG Tab Take 1 Tablet by mouth every day for 7 days, THEN 1 Tablet 2 times a day with meals for 7 days. 21 Tablet 0    Blood Glucose Meter Kit Test blood sugar as recommended by provider. Accucheck Ara blood glucose monitoring kit. 1 Kit 0    Blood Glucose Test Strips Use one Accucheck Ara strip to test blood sugar once daily early morning before first meal. 100 Strip 0    Alcohol Swabs Wipe site with prep pad prior to injection. 100 Each 0    metFORMIN (GLUCOPHAGE) 500 MG Tab Take 2 Tablets by mouth 2 times a day. One in morning, half in evening for seven days, increase to 1000 mg at morning, 1000 mg at night going forward 30 Tablet 1    insulin glargine (LANTUS SOLOSTAR) 100 UNIT/ML Solution Pen-injector injection Inject 10 Units under the skin every evening for 30 days. 3 mL 0    atorvastatin (LIPITOR) 20 MG Tab Take 1 Tablet by mouth every evening. 30 Tablet 3    Lancets Lancets order: Lancets for covered meter meter. Sig: use in morning and prn over 250 high or low sugar (70) 30 Each 3    rivaroxaban (XARELTO) 20 MG Tab tablet Take 1 Tablet by mouth with dinner. 30 Tablet 1    tamsulosin (FLOMAX) 0.4 MG capsule Take 1 Capsule by mouth 1/2 hour after breakfast. 30 Capsule  0     No current facility-administered medications for this visit.       Allergies:  No Known Allergies    Family History:   family history includes Breast Cancer in his mother.     Social History:    Social History     Tobacco Use    Smoking status: Former     Packs/day: 2.00     Years: 35.00     Pack years: 70.00     Types: Cigarettes    Smokeless tobacco: Never   Vaping Use    Vaping Use: Never used   Substance Use Topics    Alcohol use: Not Currently    Drug use: Yes     Types: Marijuana     Comment: Marijuana x2/ week     Employment: Disability  Activity Level: moderate  Living situation:  long-term hotel  Recent travel:  none  Other (stressors, spirituality, exposures):  none    Physical Exam:  Vitals:    06/19/23 1521   BP: (!) 157/79   Pulse: 94   Temp: 36.6 °C (97.8 °F)   SpO2: 94%     Body mass index is 28.24 kg/m².  Physical Exam  Constitutional:       Comments: Slightly overweight, but not obese. Muscular   HENT:      Mouth/Throat:      Pharynx: No oropharyngeal exudate or posterior oropharyngeal erythema.   Eyes:      Extraocular Movements: Extraocular movements intact.      Pupils: Pupils are equal, round, and reactive to light.   Cardiovascular:      Rate and Rhythm: Normal rate and regular rhythm.      Pulses: Normal pulses.      Heart sounds: No murmur heard.     No gallop.   Pulmonary:      Effort: Pulmonary effort is normal. No respiratory distress.      Breath sounds: No wheezing or rales.   Abdominal:      General: Abdomen is flat. Bowel sounds are normal. There is no distension.      Tenderness: There is no abdominal tenderness.   Musculoskeletal:      Cervical back: Normal range of motion.      Right lower leg: No edema.      Left lower leg: No edema.   Feet:      Comments: No decreased sensation  Skin:     General: Skin is warm and dry.      Findings: No lesion or rash.   Neurological:      General: No focal deficit present.      Mental Status: He is alert and oriented to person, place, and  time.   Psychiatric:         Mood and Affect: Mood normal.         Behavior: Behavior normal.          Labs:   12.6% A1c, confirmed from [facility] records    Imaging:  none    Assessment/Plan:  Type 2 diabetes mellitus without complication, without long-term current use of insulin (HCC)  Newly diagnosed Diabetes Mellitus Type 2. Patient had incidental finding of 12.6% A1c. Patient has been prediabetic in the past. Patient appears to be muscular and not obese. Had had negative changes in diet as of late.   Discussed importance of diet, exercise and following up with providers. Because of type on onset, with associated weight loss, concern for ANGELA.     Plan:  -Glucometer  -Lantus 10 units, nightly  -Atorvastatin 20 mg, daily  -Start metformin up taper: 500 mg one week, 500 mg twice per day one week, 1000 mg in the morning, 500 mg at night for one week, 1000 mg twice per day after   -Write down fasting blood sugars until next visit  -Diabetic educator referral  -Nutritionist referral  -C-peptide  -GAD65  -islet cells  -Microalbumin/Creatine ratio urine  -Follow up next week    Deep vein thrombosis (DVT) of lower extremity (HCC)  Patient has stopped Xeralto. Concern for cost. Unsure if he under took this with the guidance of a physician. Will address in more detail next visit.            All imaging results and lab results and consult notes are reviewed at this visit.  Follow up: Return in about 2 weeks (around 7/3/2023).    Caren Simmons MD  Internal Medicine PGY-2

## 2023-06-20 NOTE — TELEPHONE ENCOUNTER
Pharmacy comment: Alternative Requested:THE PRESCRIBED MEDICATION IS NOT COVERED BY INSURANCE. PLEASE CONSIDER CHANGING TO ONE OF THE SUGGESTED COVERED ALTERNATIVES.

## 2023-06-21 PROBLEM — R11.0 NAUSEA: Status: RESOLVED | Noted: 2021-09-23 | Resolved: 2023-06-21

## 2023-06-21 PROBLEM — E87.1 HYPONATREMIA: Status: RESOLVED | Noted: 2021-09-22 | Resolved: 2023-06-21

## 2023-06-21 PROBLEM — E11.9 TYPE 2 DIABETES MELLITUS WITHOUT COMPLICATION, WITHOUT LONG-TERM CURRENT USE OF INSULIN (HCC): Status: ACTIVE | Noted: 2023-03-17

## 2023-06-21 ASSESSMENT — ENCOUNTER SYMPTOMS
FOCAL WEAKNESS: 0
CARDIOVASCULAR NEGATIVE: 1
NAUSEA: 0
SENSORY CHANGE: 0
DIZZINESS: 0
GASTROINTESTINAL NEGATIVE: 1
VOMITING: 0
FEVER: 0
CHILLS: 0
RESPIRATORY NEGATIVE: 1
WEIGHT LOSS: 1
DOUBLE VISION: 0
BLURRED VISION: 0
PSYCHIATRIC NEGATIVE: 1
TINGLING: 0
BACK PAIN: 1
EYES NEGATIVE: 1
FLANK PAIN: 0
HEADACHES: 0

## 2023-06-22 NOTE — ASSESSMENT & PLAN NOTE
Newly diagnosed Diabetes Mellitus Type 2. Patient had incidental finding of 12.6% A1c. Patient has been prediabetic in the past. Patient appears to be muscular and not obese. Had had negative changes in diet as of late.   Discussed importance of diet, exercise and following up with providers. Because of type on onset, with associated weight loss, concern for ANGELA.     Plan:  -Glucometer  -Lantus 10 units, nightly  -Atorvastatin 20 mg, daily  -Start metformin up taper: 500 mg one week, 500 mg twice per day one week, 1000 mg in the morning, 500 mg at night for one week, 1000 mg twice per day after   -Write down fasting blood sugars until next visit  -Diabetic educator referral  -Nutritionist referral  -C-peptide  -GAD65  -islet cells  -Microalbumin/Creatine ratio urine  -Follow up next week

## 2023-06-22 NOTE — ASSESSMENT & PLAN NOTE
Patient has stopped Xeralto. Concern for cost. Unsure if he under took this with the guidance of a physician. Will address in more detail next visit.

## 2023-06-25 NOTE — PROGRESS NOTES
No chief complaint on file.      HPI: Ajit Gregory is a 62 y.o. male with past medical history as below who presented to the clinic for the following.    *Type 2 DM  -on metformin , insulin 10 units , A1C of 6 in 2021  12. Something recently - detected prior to surgery - which had to be cancelled.   Started on 10 units glargine and metformin titrated up with lab tests ordered   *Lumbar radiculopathy   *Sacroiliitis   *Obesity   *Abdominal pain   *Constipation   *Thrombocytopathia - low platelets 111  *Superior mesenteric vein thrombosis - on rivaroxaban   *Urinary retention-? On tamsulosin   *History of tobacco use   *DVT- on rivaroxaban    *Dyslipidemia -on atorvastatin  *Hyponatremia   *Elevated ALP with other enzymes normal     Elevated blood pressure noted on last vitals       Patient Active Problem List    Diagnosis Date Noted    Type 2 diabetes mellitus without complication, without long-term current use of insulin (HCC) 03/17/2023    Lumbar radiculopathy 03/17/2023    Sacroiliitis, not elsewhere classified (HCC) 03/17/2023    Obesity (BMI 30-39.9) 03/17/2023    Healthcare maintenance 03/17/2023    Abdominal pain 09/23/2021    Constipation 09/23/2021    Thrombocytopathia (HCC) 09/22/2021    Superior mesenteric vein thrombosis (HCC) 09/22/2021    Urinary retention 09/22/2021    History of tobacco abuse 09/22/2021    Deep vein thrombosis (DVT) of lower extremity ([facility]) 12/03/2020       Current Outpatient Medications   Medication Sig Dispense Refill    magnesium citrate Solution Take 270 mL by mouth one time. gummies      metFORMIN (GLUCOPHAGE) 500 MG Tab Take 1 Tablet by mouth every day for 7 days, THEN 1 Tablet 2 times a day with meals for 7 days. 21 Tablet 0    Blood Glucose Meter Kit Test blood sugar as recommended by provider. Accucheck Ara blood glucose monitoring kit. 1 Kit 0    Blood Glucose Test Strips Use one Accucheck Ara strip to test blood sugar once daily early morning before first  meal. 100 Strip 0    Alcohol Swabs Wipe site with prep pad prior to injection. 100 Each 0    metFORMIN (GLUCOPHAGE) 500 MG Tab Take 2 Tablets by mouth 2 times a day. One in morning, half in evening for seven days, increase to 1000 mg at morning, 1000 mg at night going forward 30 Tablet 1    insulin glargine (LANTUS SOLOSTAR) 100 UNIT/ML Solution Pen-injector injection Inject 10 Units under the skin every evening for 30 days. 3 mL 0    atorvastatin (LIPITOR) 20 MG Tab Take 1 Tablet by mouth every evening. 30 Tablet 3    Lancets Lancets order: Lancets for covered meter meter. Sig: use in morning and prn over 250 high or low sugar (70) 30 Each 3    rivaroxaban (XARELTO) 20 MG Tab tablet Take 1 Tablet by mouth with dinner. 30 Tablet 1    tamsulosin (FLOMAX) 0.4 MG capsule Take 1 Capsule by mouth 1/2 hour after breakfast. 30 Capsule 0     No current facility-administered medications for this visit.       ROS: As per HPI. Additional pertinent systems as noted below.  Constitutional:  Negative for fever, chills   HENT:  Negative for ear pain, sore throat, runny nose   Eyes:  Negative for blurred vision and double vision   Cardiovascular:  Negative for chest pain, palpitations   Respiratory:  Negative for cough, sputum production, shortness of breath   Gastrointestinal: Negative for abdominal pain, nausea, vomiting, diarrhea, or blood in stools   Genitourinary: Negative for dysuria, flank pain and hematuria  Skin: Negative for rash, itching  Extremities: Negative for leg swelling   Neurologic: Negative for headaches, dizziness, seizures, weakness   Endo/Heme/Allergies: Negative for polydipsia. Does not bruise/bleed easily  Psychiatric: Negative for suicidal or homicidal ideas     There were no vitals taken for this visit.    Physical Exam   Constitutional:  Well developed, well nourished. Not in acute distress   HENT:  Normocephalic, Atraumatic, Oropharynx is pink and moist. No oral exudates, Nose normal   Eyes:  EOMI,  Conjunctiva normal, No discharge. PERRLA   Neck:  Normal range of motion, No cervical lymphadenopathy. No thyromegaly.   Cardiovascular:  Regular rate and rhythm, No pedal edema, Intact distal pulses   Respiratory: Clear to auscultation bilaterally, No use of accessory muscles   Gastrointestinal: Bowel sounds normal, Soft, No tenderness, No palpable masses/hepatosplenomegaly   Skin: Warm, Dry, No erythema, No rash, no induration.   Musculoskeletal: No cyanosis or clubbing, normal ROM   Neurologic: Alert & oriented x 4, No focal deficits noted, cranial nerves II through X are grossly intact.   Psychiatric: Judgment normal, Mood normal, Memory normal     Note: I have reviewed all pertinent labs and diagnostic tests associated with this visit with specific comments listed under the assessment and plan below    Assessment and Plan  No problem-specific Assessment & Plan notes found for this encounter.      Followup: No follow-ups on file.        Signed by: Alvaro Stephenson M.D.

## 2023-06-26 ENCOUNTER — APPOINTMENT (OUTPATIENT)
Dept: INTERNAL MEDICINE | Facility: OTHER | Age: 63
End: 2023-06-26
Payer: COMMERCIAL

## 2023-06-26 ENCOUNTER — HOSPITAL ENCOUNTER (OUTPATIENT)
Dept: LAB | Facility: MEDICAL CENTER | Age: 63
End: 2023-06-26
Payer: COMMERCIAL

## 2023-06-26 DIAGNOSIS — E11.65 UNCONTROLLED TYPE 2 DIABETES MELLITUS WITH HYPERGLYCEMIA (HCC): ICD-10-CM

## 2023-06-26 LAB
CHOLEST SERPL-MCNC: 123 MG/DL (ref 100–199)
CREAT UR-MCNC: 56.56 MG/DL
HDLC SERPL-MCNC: 26 MG/DL
LDLC SERPL CALC-MCNC: 53 MG/DL
MICROALBUMIN UR-MCNC: <1.2 MG/DL
MICROALBUMIN/CREAT UR: NORMAL MG/G (ref 0–30)
TRIGL SERPL-MCNC: 222 MG/DL (ref 0–149)

## 2023-06-26 PROCEDURE — 86341 ISLET CELL ANTIBODY: CPT | Mod: 91

## 2023-06-26 PROCEDURE — 82043 UR ALBUMIN QUANTITATIVE: CPT

## 2023-06-26 PROCEDURE — 36415 COLL VENOUS BLD VENIPUNCTURE: CPT

## 2023-06-26 PROCEDURE — 82570 ASSAY OF URINE CREATININE: CPT

## 2023-06-26 PROCEDURE — 84681 ASSAY OF C-PEPTIDE: CPT

## 2023-06-26 PROCEDURE — 80061 LIPID PANEL: CPT

## 2023-06-28 LAB — C PEPTIDE SERPL-MCNC: 3.1 NG/ML (ref 0.5–3.3)

## 2023-06-28 RX ORDER — INSULIN GLARGINE 100 [IU]/ML
INJECTION, SOLUTION SUBCUTANEOUS
Refills: 0 | OUTPATIENT
Start: 2023-06-28

## 2023-06-28 NOTE — TELEPHONE ENCOUNTER
It says the order is a duplicate, however if there are issues with his previous prescription please let me know

## 2023-06-28 NOTE — TELEPHONE ENCOUNTER
The medication id not covered, please change it to something else.       Pharmacy comment: Alternative Requested:THE PRESCRIBED MEDICATION IS NOT COVERED BY INSURANCE. PLEASE CONSIDER CHANGING TO ONE OF THE SUGGESTED COVERED ALTERNATIVES.

## 2023-06-29 ENCOUNTER — HOSPITAL ENCOUNTER (EMERGENCY)
Facility: MEDICAL CENTER | Age: 63
End: 2023-06-29
Attending: EMERGENCY MEDICINE
Payer: COMMERCIAL

## 2023-06-29 VITALS
RESPIRATION RATE: 18 BRPM | BODY MASS INDEX: 30.82 KG/M2 | WEIGHT: 227.51 LBS | HEART RATE: 82 BPM | OXYGEN SATURATION: 96 % | SYSTOLIC BLOOD PRESSURE: 138 MMHG | TEMPERATURE: 97.5 F | DIASTOLIC BLOOD PRESSURE: 67 MMHG | HEIGHT: 72 IN

## 2023-06-29 DIAGNOSIS — L02.91 ABSCESS: ICD-10-CM

## 2023-06-29 LAB
GAD65 AB SER IA-ACNC: 8.8 IU/ML (ref 0–5)
ISLET CELL512 AB SER IA-ACNC: <5.4 U/ML (ref 0–7.4)
ZNT8 AB SERPL IA-ACNC: <10 U/ML (ref 0–15)

## 2023-06-29 PROCEDURE — 99283 EMERGENCY DEPT VISIT LOW MDM: CPT

## 2023-06-29 PROCEDURE — 303977 HCHG I & D

## 2023-06-29 PROCEDURE — 304217 HCHG IRRIGATION SYSTEM

## 2023-06-29 PROCEDURE — 700111 HCHG RX REV CODE 636 W/ 250 OVERRIDE (IP): Performed by: EMERGENCY MEDICINE

## 2023-06-29 PROCEDURE — 96372 THER/PROPH/DIAG INJ SC/IM: CPT

## 2023-06-29 PROCEDURE — 700101 HCHG RX REV CODE 250: Performed by: EMERGENCY MEDICINE

## 2023-06-29 RX ORDER — LIDOCAINE HYDROCHLORIDE AND EPINEPHRINE BITARTRATE 20; .01 MG/ML; MG/ML
10 INJECTION, SOLUTION SUBCUTANEOUS ONCE
Status: COMPLETED | OUTPATIENT
Start: 2023-06-29 | End: 2023-06-29

## 2023-06-29 RX ORDER — SULFAMETHOXAZOLE AND TRIMETHOPRIM 800; 160 MG/1; MG/1
1 TABLET ORAL 2 TIMES DAILY
Qty: 14 TABLET | Refills: 0 | Status: ACTIVE | OUTPATIENT
Start: 2023-06-29 | End: 2023-07-06

## 2023-06-29 RX ORDER — KETOROLAC TROMETHAMINE 30 MG/ML
15 INJECTION, SOLUTION INTRAMUSCULAR; INTRAVENOUS ONCE
Status: COMPLETED | OUTPATIENT
Start: 2023-06-29 | End: 2023-06-29

## 2023-06-29 RX ADMIN — LIDOCAINE HYDROCHLORIDE,EPINEPHRINE BITARTRATE 10 ML: 20; .01 INJECTION, SOLUTION INFILTRATION; PERINEURAL at 08:00

## 2023-06-29 RX ADMIN — KETOROLAC TROMETHAMINE 15 MG: 30 INJECTION, SOLUTION INTRAMUSCULAR; INTRAVENOUS at 07:46

## 2023-06-29 ASSESSMENT — FIBROSIS 4 INDEX: FIB4 SCORE: 1.63

## 2023-06-29 NOTE — ED PROVIDER NOTES
ED Provider Note    CHIEF COMPLAINT  Chief Complaint   Patient presents with    Abscess     Pt presents d/t potential abscess in right groin/buttock.        EXTERNAL RECORDS REVIEWED  Primary care visit 6/19/2023    HPI/ROS      Ajit Gregory is a 62 y.o. male who presents with concern about a possible abscess on his right buttock.  Patient reports that he was recently diagnosed with possible diabetes, and was started on metformin by his primary care physician.  Patient reports that after receiving this news he has been very aggressively exercising, cutting out excess sugar in his diet.  He did a very long hike recently, sat down on a rock to rest and reports he scraped his inner right thigh.  Patient reports that after this he developed a small rash and this is since worsened.  Patient denies any bloody bowel movements or discharge.  She denies any associated testicular pain.  Denies any dysuria urgency or frequency.  Patient denies any fevers or chills      PAST MEDICAL HISTORY   has a past medical history of Diabetes ([facility]), DVT (deep venous thrombosis) ([facility]), Hyponatremia (9/22/2021), and Nausea (9/23/2021).    SURGICAL HISTORY   has a past surgical history that includes cervical disk and fusion anterior (2004) and fusion, spine, lumbar, plif (2022).    FAMILY HISTORY  Family History   Problem Relation Age of Onset    Breast Cancer Mother        SOCIAL HISTORY  Social History     Tobacco Use    Smoking status: Former     Packs/day: 2.00     Years: 35.00     Pack years: 70.00     Types: Cigarettes    Smokeless tobacco: Never   Vaping Use    Vaping Use: Never used   Substance and Sexual Activity    Alcohol use: Not Currently    Drug use: Yes     Types: Marijuana     Comment: Marijuana x2/ week    Sexual activity: Not on file       CURRENT MEDICATIONS  Home Medications       Reviewed by Andrew Sepulveda R.N. (Registered Nurse) on 06/29/23 at 0708  Med List Status: Not Addressed     Medication Last Dose  Status   Alcohol Swabs  Active   atorvastatin (LIPITOR) 20 MG Tab  Active   Blood Glucose Meter Kit  Active   Blood Glucose Test Strips  Active   insulin glargine (LANTUS SOLOSTAR) 100 UNIT/ML Solution Pen-injector injection  Active   Lancets  Active   magnesium citrate Solution  Active   metFORMIN (GLUCOPHAGE) 500 MG Tab  Active   metFORMIN (GLUCOPHAGE) 500 MG Tab  Active   rivaroxaban (XARELTO) 20 MG Tab tablet  Active   tamsulosin (FLOMAX) 0.4 MG capsule  Active                    ALLERGIES  No Known Allergies    PHYSICAL EXAM  VITAL SIGNS: /69   Pulse 86   Temp 36.4 °C (97.5 °F) (Temporal)   Ht 1.829 m (6')   Wt 103 kg (227 lb 8.2 oz)   SpO2 95%   BMI 30.86 kg/m²    Physical Exam  Constitutional:       Appearance: Normal appearance.   HENT:      Mouth/Throat:      Mouth: Mucous membranes are moist.   Pulmonary:      Effort: Pulmonary effort is normal.   Genitourinary:     Comments: 3 cm fluctuant mass of the proximal medial right thigh.  It is widely clear of the testicles.  It is widely clear of the perineum.  There is overlying erythema of the fluctuant mass and tenderness.  Bedside ultrasound fails to reveal any associated vascular structures, there is no associated operable signals of the lesion.  Neurological:      General: No focal deficit present.      Mental Status: He is alert and oriented to person, place, and time.   Psychiatric:         Mood and Affect: Mood normal.           DIAGNOSTIC STUDIES / PROCEDURES        Incision and Drainage Procedure Note    Indication: Abscess    Procedure: The patient was positioned appropriately and the skin over the incision site was prepped with betadine and draped in a sterile fashion. Local anesthesia was obtained by infiltration using 1% Lidocaine with epinephrine.  An incision was then made over the apex of the lesion then foul smelling and purulent material was expressed. Loculations were broken up using a hemostat and more of the material was able  to be expressed. The drainage cavity was then irrigated and packed with sterile gauze.     The patient tolerated the procedure well.    Complications: None      COURSE & MEDICAL DECISION MAKING      INITIAL ASSESSMENT, COURSE AND PLAN  Care Narrative: Patient here with abscess of his proximal right thigh.  Will perform incision and drainage.  Patient will receive Toradol to help to get the pain of the procedure.  Patient without any extension to the perineum, there is no associated crepitus, no evidence of developing foreign years.  Patient is very well-appearing.  Vitals are very reassuring.  No underlying vascular structure on bedside ultrasound      DISPOSITION AND DISCUSSIONS      Escalation of care considered, and ultimately not performed: Patient without any overt evidence of sepsis, IV antibiotics and further laboratory work-up was therefore deferred.      FINAL DIAGNOSIS  1. Abscess

## 2023-06-29 NOTE — DISCHARGE INSTRUCTIONS
The location of your abscess is in a relatively precarious area.  If the symptoms do not improve, or you get any extension into your testicles, or towards the rectum return to the emergency room.  Unless your symptoms have vastly improved I would like you to return regardless to the emergency department in 2 days for a wound recheck

## 2023-06-29 NOTE — ED TRIAGE NOTES
Chief Complaint   Patient presents with    Abscess     Pt presents d/t potential abscess in right groin/buttock.      /69   Pulse 86   Temp 36.4 °C (97.5 °F) (Temporal)   Ht 1.829 m (6')   Wt 103 kg (227 lb 8.2 oz)   SpO2 95%   BMI 30.86 kg/m²

## 2023-06-30 PROBLEM — E11.65 UNCONTROLLED TYPE 2 DIABETES MELLITUS WITH HYPERGLYCEMIA (HCC): Status: ACTIVE | Noted: 2023-03-17

## 2023-06-30 NOTE — TELEPHONE ENCOUNTER
Please advise patient since glucometer is not covered, we will try weekly injections of Trulicity. I have ordered the medication, if there is insurance coverage issues please advise patient to schedule appointment with me next week. Thank you

## 2023-07-18 ENCOUNTER — OFFICE VISIT (OUTPATIENT)
Dept: INTERNAL MEDICINE | Facility: OTHER | Age: 63
End: 2023-07-18
Payer: COMMERCIAL

## 2023-07-18 VITALS
TEMPERATURE: 98.2 F | HEART RATE: 68 BPM | WEIGHT: 225.6 LBS | BODY MASS INDEX: 27.47 KG/M2 | OXYGEN SATURATION: 95 % | DIASTOLIC BLOOD PRESSURE: 63 MMHG | HEIGHT: 76 IN | SYSTOLIC BLOOD PRESSURE: 108 MMHG

## 2023-07-18 DIAGNOSIS — E13.9 LATENT AUTOIMMUNE DIABETES IN ADULTS (LADA), MANAGED AS TYPE 1 (HCC): ICD-10-CM

## 2023-07-18 DIAGNOSIS — Z00.00 HEALTHCARE MAINTENANCE: ICD-10-CM

## 2023-07-18 DIAGNOSIS — D69.1 THROMBOCYTOPATHIA (HCC): ICD-10-CM

## 2023-07-18 PROBLEM — E10.65 UNCONTROLLED TYPE 1 DIABETES MELLITUS WITH HYPERGLYCEMIA (HCC): Status: ACTIVE | Noted: 2023-03-17

## 2023-07-18 PROBLEM — N40.0 BENIGN PROSTATIC HYPERPLASIA: Status: ACTIVE | Noted: 2023-07-18

## 2023-07-18 PROBLEM — E10.65 UNCONTROLLED TYPE 1 DIABETES MELLITUS WITH HYPERGLYCEMIA (HCC): Status: RESOLVED | Noted: 2023-03-17 | Resolved: 2023-07-18

## 2023-07-18 PROCEDURE — 3074F SYST BP LT 130 MM HG: CPT | Mod: GC

## 2023-07-18 PROCEDURE — 99214 OFFICE O/P EST MOD 30 MIN: CPT | Mod: GC

## 2023-07-18 PROCEDURE — 3078F DIAST BP <80 MM HG: CPT | Mod: GC

## 2023-07-18 RX ORDER — LANCETS 30 GAUGE
EACH MISCELLANEOUS
Qty: 30 EACH | Refills: 3 | Status: SHIPPED | OUTPATIENT
Start: 2023-07-18 | End: 2023-09-01 | Stop reason: SDUPTHER

## 2023-07-18 ASSESSMENT — ENCOUNTER SYMPTOMS
NAUSEA: 0
PALPITATIONS: 0
SHORTNESS OF BREATH: 0
HEADACHES: 0
DEPRESSION: 0
NERVOUS/ANXIOUS: 1
SEIZURES: 0
FEVER: 0
DIZZINESS: 0
HEARTBURN: 0
VOMITING: 0
HEMOPTYSIS: 0
CHILLS: 0
ABDOMINAL PAIN: 0
LOSS OF CONSCIOUSNESS: 0

## 2023-07-18 ASSESSMENT — FIBROSIS 4 INDEX: FIB4 SCORE: 1.63

## 2023-07-18 NOTE — ASSESSMENT & PLAN NOTE
Recent A1c of 12.6 on 6/9/2023.  Autoimmune work-up demonstrating MISA antibody positivity at 8.8, C-peptide WNL, IA-2 autoantibody <5.4, zinc trasnporter 8 antibody <10.  Microalbumin/creatinine ratio unable to be calculated given microalbumin <1.2.  Patient counseled extensively need for insulin given likely ANGELA, type I.     -Prescribed insulin glargine 10 units nightly  -Prescribed glucometer with test trips and lancets with patient to check BG twice daily  -Reordered metformin at lower dose of 500 mg twice daily  -Patient has nutrition and diabetes education referral placed previously, provided contact information to schedule appointment.  Will schedule appointment upon checkout  -Will need reevaluation in 2 weeks for titration of insulin

## 2023-07-18 NOTE — PATIENT INSTRUCTIONS
Nutrition services can be scheduled with our     For diabetes education:    [facility]  [address]  [place] [address]  Phone: [phone]    For colonoscopy:    [facility]  [address] [place] [address]  Phone: [phone]    Please obtain all labs ordered two visits ago, minus getting lipid profile and hemoglobin A1C.    Please inject insulin glargine 10 units nightly. We will also begin metformin 500 mg twice daily. Check your blood sugars in the morning and the evening. Supplies were ordered

## 2023-07-18 NOTE — ASSESSMENT & PLAN NOTE
Has a history of provoked thromboses consisting of DVT of LLE in 2020 and superior mesenteric vein thrombosis in 2021.  Patient was sedentary at those times due to being on disability from lumbar back pain 2/2 degenerative disease and spondylolysis.  Has not been worked up for thrombophilia.  Interestingly, noted to have platelets of 111 when seen in ED on 3/4 and 112 on 6/9 labs.    -Continue Xarelto 20 mg p.o. every night  -Pending repeat CBC to reassess platelet levels  -Factor V Leiden and prothrombin [id number] hypercoagulability work-up  -Pending low-dose chest CT and referral to GI for colonoscopy.  Referral information provided  -Reevaluate next visit

## 2023-07-18 NOTE — PROGRESS NOTES
Established Patient    Patient Care Team:  Kalyan Taylor D.O. as PCP - General (Internal Medicine)    Ajit Gregory is a 62 y.o. 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    HPI:  Patient is here for routine follow-up visit as well as to discuss labs.  Recently diagnosed with new onset uncontrolled diabetes mellitus with A1c of 12.6 on outside labs (6/9). He was also ordered insulin glargine 10 units nightly alongside metformin given significantly elevated A1c, however glucometer and insulin were not covered by insurance and patient did not  metformin.  Given that patient is physically fit, work-up for ANGELA (type I) was ordered last visit as well, with MISA antibody positive at 8.8.  C-peptide was WNL, IA-2 autoantibody < 5.4, zinc transporter antibody < 8.  Microalbumin creatinine ratio unable to calculate due to very minimal microalbumin (< 1.2). Extensive counseling was provided to patient regarding need for insulin and glucometer checks at least twice daily.  Also amenable to begin metformin as well.  Was provided information on contacting nutrition and diabetes education, with appointment to be set up upon checkout.  Patient otherwise doing very well, has been physically active daily consisting mainly of aerobics (i.e. walks/runs).  Has also cut out sugars almost completely out of his diet.  Currently on Workmen's Compensation, following with neurosurgery for left SI joint fusion in the near future.  Of note, recent proximal right medial thigh abscess was noted after scraping his medial thigh while sitting on a rock.  Currently s/p bedside I&D by ED on 6/29/2023.  Currently completing course of antibiotics, with 2 days left of course.  Unsure what names of antibiotics are.    Review of Systems    Constitutional:  Negative for chills and fever.   Respiratory:  Negative for hemoptysis and shortness of breath.    Cardiovascular:  Negative for chest pain, palpitations and leg swelling.   Gastrointestinal:  Negative for abdominal pain, heartburn, nausea and vomiting.   Genitourinary:  Negative for dysuria and hematuria.   Neurological:  Negative for dizziness, seizures, loss of consciousness and headaches.   Psychiatric/Behavioral:  Negative for depression. The patient is nervous/anxious.        Past Medical History:   Diagnosis Date    Diabetes (HCC)     DVT (deep venous thrombosis) (HCC)     Hyponatremia 9/22/2021    Nausea 9/23/2021     Social History     Tobacco Use    Smoking status: Former     Packs/day: 2.00     Years: 35.00     Pack years: 70.00     Types: Cigarettes    Smokeless tobacco: Never   Vaping Use    Vaping Use: Never used   Substance Use Topics    Alcohol use: Not Currently    Drug use: Yes     Types: Marijuana     Comment: Marijuana x2/ week     Current Outpatient Medications   Medication Sig Dispense Refill    Blood Glucose Meter Kit Check blood sugar once in the morning and once in the evening. 1 Kit 0    Blood Glucose Test Strips Use one strip once in the morning and one in the evening. 100 Strip 0    Lancets Lancets order: Lancets for covered glucometer. Sig: use in morning and once in the evening 30 Each 3    metFORMIN (GLUCOPHAGE) 500 MG Tab Take 1 Tablet by mouth 2 times a day. 30 Tablet 1    insulin glargine 100 UNIT/ML SC SOPN injection Inject 10 Units under the skin every evening. 3 mL 1    magnesium citrate Solution Take 270 mL by mouth one time. gummies      Alcohol Swabs Wipe site with prep pad prior to injection. 100 Each 0    atorvastatin (LIPITOR) 20 MG Tab Take 1 Tablet by mouth every evening. 30 Tablet 3    rivaroxaban (XARELTO) 20 MG Tab tablet Take 1 Tablet by mouth with dinner. 30 Tablet 1    tamsulosin (FLOMAX) 0.4 MG capsule Take 1 Capsule by mouth 1/2 hour after  "breakfast. 30 Capsule 0     No current facility-administered medications for this visit.       /63 (BP Location: Right arm, Patient Position: Sitting, BP Cuff Size: Adult)   Pulse 68   Temp 36.8 °C (98.2 °F) (Temporal)   Ht 1.93 m (6' 4\")   Wt 102 kg (225 lb 9.6 oz)   SpO2 95%   BMI 27.46 kg/m²   Physical Exam  Constitutional:       General: He is not in acute distress.     Appearance: He is normal weight.   HENT:      Head: Normocephalic.      Nose: Nose normal.      Mouth/Throat:      Mouth: Mucous membranes are moist.   Eyes:      Conjunctiva/sclera: Conjunctivae normal.   Cardiovascular:      Rate and Rhythm: Normal rate and regular rhythm.      Pulses: Normal pulses.      Heart sounds: Normal heart sounds. No murmur heard.     No friction rub. No gallop.   Pulmonary:      Effort: Pulmonary effort is normal.      Breath sounds: Normal breath sounds. No wheezing, rhonchi or rales.   Abdominal:      General: Abdomen is flat.      Tenderness: There is no abdominal tenderness. There is no guarding or rebound.   Musculoskeletal:      Right lower leg: No edema.      Left lower leg: No edema.   Neurological:      General: No focal deficit present.      Mental Status: He is alert and oriented to person, place, and time.   Psychiatric:         Mood and Affect: Mood normal.         Behavior: Behavior normal.         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  Autoimmune work-up demonstrating MISA antibody positivity at 8.8, C-peptide WNL, IA-2 autoantibody <5.4, zinc trasnporter 8 antibody <10.  Microalbumin/creatinine ratio unable to be calculated given microalbumin <1.2.  Patient counseled extensively need for insulin given likely ANGELA, type I.     -Prescribed insulin glargine 10 units nightly  -Prescribed glucometer with test trips and lancets with patient to check BG twice daily  -Reordered metformin at lower dose of 500 mg " twice daily  -Patient has nutrition and diabetes education referral placed previously, provided contact information to schedule appointment.  Will schedule appointment upon checkout  -Will need reevaluation in 2 weeks for titration of insulin    Thrombocytopathia (HCC)  Has a history of provoked thromboses consisting of DVT of LLE in 2020 and superior mesenteric vein thrombosis in 2021.  Patient was sedentary at those times due to being on disability from lumbar back pain 2/2 degenerative disease and spondylolysis.  Has not been worked up for thrombophilia.  Interestingly, noted to have platelets of 111 when seen in ED on 3/4 and 112 on 6/9 labs.    -Continue Xarelto 20 mg p.o. every night  -Pending repeat CBC to reassess platelet levels  -Factor V Leiden and prothrombin [id number] hypercoagulability work-up  -Pending low-dose chest CT and referral to GI for colonoscopy.  Referral information provided  -Reevaluate next visit    Healthcare maintenance  Patient to also obtain hepatitis C viral antibodies, RPR, HIV, CMP which are pending orders from 2 visits ago      Orders Placed This Encounter    Blood Glucose Meter Kit    Blood Glucose Test Strips    Lancets    metFORMIN (GLUCOPHAGE) 500 MG Tab    insulin glargine 100 UNIT/ML SC SOPN injection       Return in about 2 weeks (around 8/1/2023).    Kalyan Taylor D.O. PGY II  Internal Medicine  [facility]

## 2023-07-18 NOTE — ASSESSMENT & PLAN NOTE
Patient to also obtain hepatitis C viral antibodies, RPR, HIV, CMP which are pending orders from 2 visits ago

## 2023-07-21 ENCOUNTER — TELEPHONE (OUTPATIENT)
Dept: INTERNAL MEDICINE | Facility: OTHER | Age: 63
End: 2023-07-21
Payer: COMMERCIAL

## 2023-07-21 NOTE — TELEPHONE ENCOUNTER
Emelina Brown, supervisor speaking to family at this time        Aliza Client, RN  01/15/18 [id number] Patient was seen here on 7/18 but is unable to  the insulin glargine from the pharmacy. Patient received the rest of the prescriptions besides that one. Please advise

## 2023-07-21 NOTE — TELEPHONE ENCOUNTER
Spoke with pharmacy regarding the insulin and it's on backed ordered. [facility] [address] and they were able to fill it. It should be ready in 1 hour, patient was notified

## 2023-07-24 ENCOUNTER — TELEPHONE (OUTPATIENT)
Dept: INTERNAL MEDICINE | Facility: OTHER | Age: 63
End: 2023-07-24
Payer: COMMERCIAL

## 2023-07-24 DIAGNOSIS — E13.9 LADA (LATENT AUTOIMMUNE DIABETES IN ADULTS), MANAGED AS TYPE 1 (HCC): ICD-10-CM

## 2023-07-24 NOTE — TELEPHONE ENCOUNTER
Patient went to  his insulin but the needles are not included and wants to know what his option is. The box of needles is $60 and he cant afford it.  He would like a call back from his MA for Brandon.

## 2023-07-25 DIAGNOSIS — K55.069 SUPERIOR MESENTERIC VEIN THROMBOSIS (HCC): ICD-10-CM

## 2023-07-25 RX ORDER — RIVAROXABAN 20 MG/1
20 TABLET, FILM COATED ORAL
Qty: 30 TABLET | Refills: 1 | Status: SHIPPED | OUTPATIENT
Start: 2023-07-25 | End: 2023-10-04

## 2023-07-27 ENCOUNTER — HOSPITAL ENCOUNTER (OUTPATIENT)
Dept: LAB | Facility: MEDICAL CENTER | Age: 63
End: 2023-07-27
Payer: COMMERCIAL

## 2023-07-27 DIAGNOSIS — Z13.228 SCREENING FOR METABOLIC DISORDER: ICD-10-CM

## 2023-07-27 DIAGNOSIS — Z11.59 NEED FOR HEPATITIS C SCREENING TEST: ICD-10-CM

## 2023-07-27 DIAGNOSIS — R73.03 PREDIABETES: ICD-10-CM

## 2023-07-27 DIAGNOSIS — Z11.3 ROUTINE SCREENING FOR STI (SEXUALLY TRANSMITTED INFECTION): ICD-10-CM

## 2023-07-27 DIAGNOSIS — D69.1 THROMBOCYTOPATHIA (HCC): ICD-10-CM

## 2023-07-27 LAB
ALBUMIN SERPL BCP-MCNC: 4.5 G/DL (ref 3.2–4.9)
ALBUMIN/GLOB SERPL: 1.6 G/DL
ALP SERPL-CCNC: 117 U/L (ref 30–99)
ALT SERPL-CCNC: 33 U/L (ref 2–50)
ANION GAP SERPL CALC-SCNC: 13 MMOL/L (ref 7–16)
AST SERPL-CCNC: 22 U/L (ref 12–45)
BASOPHILS # BLD AUTO: 0.4 % (ref 0–1.8)
BASOPHILS # BLD: 0.02 K/UL (ref 0–0.12)
BILIRUB SERPL-MCNC: 0.6 MG/DL (ref 0.1–1.5)
BUN SERPL-MCNC: 18 MG/DL (ref 8–22)
CALCIUM ALBUM COR SERPL-MCNC: 9.4 MG/DL (ref 8.5–10.5)
CALCIUM SERPL-MCNC: 9.8 MG/DL (ref 8.5–10.5)
CHLORIDE SERPL-SCNC: 102 MMOL/L (ref 96–112)
CHOLEST SERPL-MCNC: 72 MG/DL (ref 100–199)
CO2 SERPL-SCNC: 22 MMOL/L (ref 20–33)
CREAT SERPL-MCNC: 1.07 MG/DL (ref 0.5–1.4)
EOSINOPHIL # BLD AUTO: 0.09 K/UL (ref 0–0.51)
EOSINOPHIL NFR BLD: 1.9 % (ref 0–6.9)
ERYTHROCYTE [DISTWIDTH] IN BLOOD BY AUTOMATED COUNT: 50.3 FL (ref 35.9–50)
EST. AVERAGE GLUCOSE BLD GHB EST-MCNC: 260 MG/DL
GFR SERPLBLD CREATININE-BSD FMLA CKD-EPI: 79 ML/MIN/1.73 M 2
GLOBULIN SER CALC-MCNC: 2.9 G/DL (ref 1.9–3.5)
GLUCOSE SERPL-MCNC: 339 MG/DL (ref 65–99)
HBA1C MFR BLD: 10.7 % (ref 4–5.6)
HCT VFR BLD AUTO: 48.9 % (ref 42–52)
HCV AB SER QL: REACTIVE
HDLC SERPL-MCNC: 31 MG/DL
HGB BLD-MCNC: 16.4 G/DL (ref 14–18)
HIV 1+2 AB+HIV1 P24 AG SERPL QL IA: NORMAL
IMM GRANULOCYTES # BLD AUTO: 0 K/UL (ref 0–0.11)
IMM GRANULOCYTES NFR BLD AUTO: 0 % (ref 0–0.9)
LDLC SERPL CALC-MCNC: 9 MG/DL
LYMPHOCYTES # BLD AUTO: 1.13 K/UL (ref 1–4.8)
LYMPHOCYTES NFR BLD: 23.9 % (ref 22–41)
MCH RBC QN AUTO: 31.2 PG (ref 27–33)
MCHC RBC AUTO-ENTMCNC: 33.5 G/DL (ref 32.3–36.5)
MCV RBC AUTO: 93.1 FL (ref 81.4–97.8)
MONOCYTES # BLD AUTO: 0.24 K/UL (ref 0–0.85)
MONOCYTES NFR BLD AUTO: 5.1 % (ref 0–13.4)
NEUTROPHILS # BLD AUTO: 3.25 K/UL (ref 1.82–7.42)
NEUTROPHILS NFR BLD: 68.7 % (ref 44–72)
NRBC # BLD AUTO: 0 K/UL
NRBC BLD-RTO: 0 /100 WBC (ref 0–0.2)
PLATELET # BLD AUTO: 109 K/UL (ref 164–446)
PMV BLD AUTO: 11 FL (ref 9–12.9)
POTASSIUM SERPL-SCNC: 4.6 MMOL/L (ref 3.6–5.5)
PROT SERPL-MCNC: 7.4 G/DL (ref 6–8.2)
RBC # BLD AUTO: 5.25 M/UL (ref 4.7–6.1)
SODIUM SERPL-SCNC: 137 MMOL/L (ref 135–145)
T PALLIDUM AB SER QL IA: NORMAL
TRIGL SERPL-MCNC: 161 MG/DL (ref 0–149)
WBC # BLD AUTO: 4.7 K/UL (ref 4.8–10.8)

## 2023-07-27 PROCEDURE — 80053 COMPREHEN METABOLIC PANEL: CPT

## 2023-07-27 PROCEDURE — 36415 COLL VENOUS BLD VENIPUNCTURE: CPT

## 2023-07-27 PROCEDURE — 81241 F5 GENE: CPT

## 2023-07-27 PROCEDURE — 80061 LIPID PANEL: CPT

## 2023-07-27 PROCEDURE — 85025 COMPLETE CBC W/AUTO DIFF WBC: CPT

## 2023-07-27 PROCEDURE — 87389 HIV-1 AG W/HIV-1&-2 AB AG IA: CPT

## 2023-07-27 PROCEDURE — 83036 HEMOGLOBIN GLYCOSYLATED A1C: CPT

## 2023-07-27 PROCEDURE — 86780 TREPONEMA PALLIDUM: CPT

## 2023-07-27 PROCEDURE — 81240 F2 GENE: CPT

## 2023-07-27 PROCEDURE — 87522 HEPATITIS C REVRS TRNSCRPJ: CPT | Mod: XU

## 2023-07-27 PROCEDURE — 86803 HEPATITIS C AB TEST: CPT

## 2023-07-28 ENCOUNTER — OFFICE VISIT (OUTPATIENT)
Dept: INTERNAL MEDICINE | Facility: OTHER | Age: 63
End: 2023-07-28
Payer: COMMERCIAL

## 2023-07-28 VITALS
TEMPERATURE: 97.1 F | DIASTOLIC BLOOD PRESSURE: 54 MMHG | HEART RATE: 82 BPM | WEIGHT: 223 LBS | HEIGHT: 76 IN | OXYGEN SATURATION: 97 % | SYSTOLIC BLOOD PRESSURE: 92 MMHG | BODY MASS INDEX: 27.16 KG/M2

## 2023-07-28 DIAGNOSIS — R76.8 HCV ANTIBODY POSITIVE: ICD-10-CM

## 2023-07-28 DIAGNOSIS — E13.9 LATENT AUTOIMMUNE DIABETES IN ADULTS (LADA), MANAGED AS TYPE 1 (HCC): ICD-10-CM

## 2023-07-28 PROCEDURE — 99214 OFFICE O/P EST MOD 30 MIN: CPT | Mod: GC

## 2023-07-28 PROCEDURE — 3074F SYST BP LT 130 MM HG: CPT | Mod: GC

## 2023-07-28 PROCEDURE — 3078F DIAST BP <80 MM HG: CPT | Mod: GC

## 2023-07-28 RX ORDER — MAGNESIUM GLUCONATE 27 MG(500)
500 TABLET ORAL 3 TIMES DAILY
COMMUNITY

## 2023-07-28 RX ORDER — ATORVASTATIN CALCIUM 20 MG/1
20 TABLET, FILM COATED ORAL NIGHTLY
Qty: 30 TABLET | Refills: 3 | Status: SHIPPED | OUTPATIENT
Start: 2023-07-28 | End: 2023-11-21

## 2023-07-28 ASSESSMENT — ENCOUNTER SYMPTOMS
VOMITING: 0
BLOOD IN STOOL: 0
SEIZURES: 0
CHILLS: 0
DIZZINESS: 0
NERVOUS/ANXIOUS: 0
HEARTBURN: 0
NAUSEA: 0
HEADACHES: 0
PALPITATIONS: 0
ABDOMINAL PAIN: 0
FALLS: 0
COUGH: 0
LOSS OF CONSCIOUSNESS: 0
FEVER: 0
SHORTNESS OF BREATH: 0
DEPRESSION: 0

## 2023-07-28 ASSESSMENT — FIBROSIS 4 INDEX: FIB4 SCORE: 2.14

## 2023-07-28 NOTE — ASSESSMENT & PLAN NOTE
Recent diagnosis of ANGELA, type I (A1c of 10.7, MISA-7 positive).  Has been on insulin glargine 10 units nightly.  BG's have significantly proved typically between 120s-190s on twice daily checks, however noted to have 1 evening sugar of 279.    -Continue metformin 500 mg twice daily  -Increased insulin glargine to 12 units nightly  -Prescribed atorvastatin 20 mg nightly given patient is now diabetic  -Ophthalmology referral for routine retinal screenings

## 2023-07-28 NOTE — PATIENT INSTRUCTIONS
[facility]  [address] [place] [address]  Phone: [phone]    Please obtain Low dose chest CT     Referral for ophthalmology placed to check your eyes    Please obtain labs (hepatitis C RNA)    Increase insulin to 12 units nightly, however if you notice sugars below 70 please go back to 10 unitus

## 2023-07-28 NOTE — PROGRESS NOTES
Established Patient    Patient Care Team:  Kalyan Taylor D.O. as PCP - General (Internal Medicine)    Ajit Gregory is a 62 y.o. 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    HPI:  Patient here for follow-up visit.  History of recent ANGELA type I, SMV thrombosis and DVT LLE on Xarelto.  Before starting insulin on 7/25, BG's were in 200s-300s on checks to glucometer.  However after initiating, initially 200s on first day, followed by 120s-190s on the following days (however 1 measure in the evening was 279).  Also positive for hepatitis C.  Denies IV drug use, however does have a tattoo on his back, with high risk sexual encounters (no protection) with 3 different partners in the past 4 years.  Denies any hepatobiliary symptoms at this time.  Pending obtaining colonoscopy through GI referral.  Interested in establishing with ophthalmology for routine retinal screening given new onset diabetes    Review of Systems   Constitutional:  Negative for chills and fever.   Respiratory:  Negative for cough and shortness of breath.    Cardiovascular:  Negative for chest pain, palpitations and leg swelling.   Gastrointestinal:  Negative for abdominal pain, blood in stool, heartburn, nausea and vomiting.   Genitourinary:  Negative for dysuria and hematuria.   Musculoskeletal:  Negative for falls.   Neurological:  Negative for dizziness, seizures, loss of consciousness and headaches.   Psychiatric/Behavioral:  Negative for depression. The patient is not nervous/anxious.        Past Medical History:   Diagnosis Date    Diabetes (HCC)     DVT (deep venous thrombosis) (HCC)     Hyponatremia 9/22/2021    Nausea 9/23/2021     Social History     Tobacco Use    Smoking status: Former     Packs/day: 2.00     Years: 35.00     Pack years: 70.00     Types:  "Cigarettes    Smokeless tobacco: Never   Vaping Use    Vaping Use: Never used   Substance Use Topics    Alcohol use: Not Currently    Drug use: Yes     Types: Marijuana     Comment: Marijuana x2/ week     Current Outpatient Medications   Medication Sig Dispense Refill    magnesium gluconate (MAG-G) 500 MG tablet Take 500 mg by mouth 3 times a day.      insulin glargine 100 UNIT/ML SC SOPN injection Inject 12 Units under the skin every evening. 3 mL 1    atorvastatin (LIPITOR) 20 MG Tab Take 1 Tablet by mouth every evening. 30 Tablet 3    XARELTO 20 MG Tab tablet TAKE 1 TABLET BY MOUTH EVERY DAY WITH DINNER 30 Tablet 1    Insulin Pen Needle 32 G x 4 mm Use one pen needle in pen device to inject insulin once daily. 100 Each 0    Blood Glucose Meter Kit Check blood sugar once in the morning and once in the evening. 1 Kit 0    Blood Glucose Test Strips Use one strip once in the morning and one in the evening. 100 Strip 0    Lancets Lancets order: Lancets for covered glucometer. Sig: use in morning and once in the evening 30 Each 3    metFORMIN (GLUCOPHAGE) 500 MG Tab Take 1 Tablet by mouth 2 times a day. 30 Tablet 1    Alcohol Swabs Wipe site with prep pad prior to injection. 100 Each 0    tamsulosin (FLOMAX) 0.4 MG capsule Take 1 Capsule by mouth 1/2 hour after breakfast. 30 Capsule 0     No current facility-administered medications for this visit.       BP 92/54 (BP Location: Left arm, Patient Position: Sitting, BP Cuff Size: Adult)   Pulse 82   Temp 36.2 °C (97.1 °F) (Temporal)   Ht 1.93 m (6' 4\")   Wt 101 kg (223 lb)   SpO2 97%   BMI 27.14 kg/m²   Physical Exam  Constitutional:       General: He is not in acute distress.     Appearance: He is normal weight.   HENT:      Head: Normocephalic and atraumatic.      Nose: Nose normal.      Mouth/Throat:      Mouth: Mucous membranes are moist.   Eyes:      Conjunctiva/sclera: Conjunctivae normal.   Cardiovascular:      Rate and Rhythm: Normal rate and regular rhythm. "      Pulses: Normal pulses.      Heart sounds: No murmur heard.     No friction rub. No gallop.   Pulmonary:      Effort: Pulmonary effort is normal.      Breath sounds: Normal breath sounds. No wheezing, rhonchi or rales.   Abdominal:      General: Abdomen is flat.      Palpations: Abdomen is soft.      Tenderness: There is no abdominal tenderness. There is no guarding or rebound.   Musculoskeletal:      Right lower leg: No edema.      Left lower leg: No edema.   Skin:     General: Skin is warm.   Neurological:      General: No focal deficit present.      Mental Status: He is alert and oriented to person, place, and time.   Psychiatric:         Mood and Affect: Mood normal.         Behavior: Behavior normal.     Monofilament testing with a 10 gram force: sensation intact: intact bilaterally  Visual Inspection: Feet without maceration, ulcers, fissures.  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  Has been on insulin glargine 10 units nightly.  BG's have significantly proved typically between 120s-190s on twice daily checks, however noted to have 1 evening sugar of 279.    -Continue metformin 500 mg twice daily  -Increased insulin glargine to 12 units nightly  -Prescribed atorvastatin 20 mg nightly given patient is now diabetic  -Ophthalmology referral for routine retinal screenings    HCV antibody positive  Noted to be hepatitis C viral antibody positive.  No history of IVDU, however does have tattoo on back, as well as high risk sexual behaviors (no protection) with 3 partners in the past 4 years.  No history of or current hepatobiliary symptoms.    -Obtaining HCV viral RNA with reflex genotype  -Reevaluate at next visit      Orders Placed This Encounter    Diabetic Monofilament LE Exam    HCV RNA PCR-GENOTYPE REFLEX    Referral to Ophthalmology    magnesium  gluconate (MAG-G) 500 MG tablet    insulin glargine 100 UNIT/ML SC SOPN injection    atorvastatin (LIPITOR) 20 MG Tab       Return in about 6 weeks (around 9/8/2023).    Kalyan Taylor D.O. PGY II  Internal Medicine  [facility]

## 2023-07-28 NOTE — ASSESSMENT & PLAN NOTE
Noted to be hepatitis C viral antibody positive.  No history of IVDU, however does have tattoo on back, as well as high risk sexual behaviors (no protection) with 3 partners in the past 4 years.  No history of or current hepatobiliary symptoms.    -Obtaining HCV viral RNA with reflex genotype  -Reevaluate at next visit

## 2023-07-29 LAB
HCV RNA SERPL NAA+PROBE-ACNC: NOT DETECTED IU/ML
HCV RNA SERPL NAA+PROBE-LOG IU: NOT DETECTED LOG IU/ML
HCV RNA SERPL QL NAA+PROBE: NOT DETECTED

## 2023-07-31 ENCOUNTER — TELEPHONE (OUTPATIENT)
Dept: INTERNAL MEDICINE | Facility: OTHER | Age: 63
End: 2023-07-31
Payer: COMMERCIAL

## 2023-07-31 DIAGNOSIS — E83.40: ICD-10-CM

## 2023-07-31 LAB — F5 P.R506Q BLD/T QL: ABNORMAL

## 2023-07-31 NOTE — TELEPHONE ENCOUNTER
Patient called about his Magnesium Gluconate. The pharmacy said his Lipitor is available, but that the Mag-G is too early to fill. He was just seen July 28th with Dr. Taylor and isn't sure whether or not he needs to be taking the Mag-G 500mg 3 times daily, when that seems like an awful lot. He would appreciate if someone could call him to clarify these details.

## 2023-08-01 NOTE — TELEPHONE ENCOUNTER
Please let patient know that the magnesium supplementation with 250 mg BID is appropriate. I have gone ahead and ordered a magnesium lab for patient to obtain prior to his follow-up.

## 2023-08-02 LAB — F2 C.20210G>A GENO BLD/T: NEGATIVE

## 2023-08-21 DIAGNOSIS — R39.11 URINARY HESITANCY: ICD-10-CM

## 2023-08-21 DIAGNOSIS — E13.9 LATENT AUTOIMMUNE DIABETES IN ADULTS (LADA), MANAGED AS TYPE 1 (HCC): ICD-10-CM

## 2023-08-21 DIAGNOSIS — E13.9 LADA (LATENT AUTOIMMUNE DIABETES IN ADULTS), MANAGED AS TYPE 1 (HCC): ICD-10-CM

## 2023-08-21 RX ORDER — TAMSULOSIN HYDROCHLORIDE 0.4 MG/1
0.4 CAPSULE ORAL
Qty: 30 CAPSULE | Refills: 0 | Status: SHIPPED | OUTPATIENT
Start: 2023-08-21

## 2023-08-21 NOTE — TELEPHONE ENCOUNTER
[facility] not heard anything from the office regarding refills of his test strips. He only has enough for one more day and would like refills sent

## 2023-09-01 ENCOUNTER — OFFICE VISIT (OUTPATIENT)
Dept: INTERNAL MEDICINE | Facility: OTHER | Age: 63
End: 2023-09-01
Payer: COMMERCIAL

## 2023-09-01 VITALS
OXYGEN SATURATION: 96 % | TEMPERATURE: 98 F | DIASTOLIC BLOOD PRESSURE: 67 MMHG | BODY MASS INDEX: 28.29 KG/M2 | HEART RATE: 81 BPM | SYSTOLIC BLOOD PRESSURE: 142 MMHG | WEIGHT: 232.4 LBS

## 2023-09-01 DIAGNOSIS — D68.51 FACTOR V LEIDEN (HCC): ICD-10-CM

## 2023-09-01 DIAGNOSIS — E13.9 LATENT AUTOIMMUNE DIABETES IN ADULTS (LADA), MANAGED AS TYPE 1 (HCC): ICD-10-CM

## 2023-09-01 DIAGNOSIS — Z00.00 HEALTHCARE MAINTENANCE: ICD-10-CM

## 2023-09-01 PROCEDURE — 3077F SYST BP >= 140 MM HG: CPT | Mod: GC

## 2023-09-01 PROCEDURE — 3078F DIAST BP <80 MM HG: CPT | Mod: GC

## 2023-09-01 PROCEDURE — 99214 OFFICE O/P EST MOD 30 MIN: CPT | Mod: GC

## 2023-09-01 RX ORDER — GLUCOSAMINE HCL/CHONDROITIN SU 500-400 MG
CAPSULE ORAL
Qty: 100 EACH | Refills: 0 | Status: SHIPPED | OUTPATIENT
Start: 2023-09-01

## 2023-09-01 RX ORDER — CALCIUM CITRATE/VITAMIN D3 200MG-6.25
TABLET ORAL
COMMUNITY
Start: 2023-08-21 | End: 2023-10-23

## 2023-09-01 RX ORDER — LANCETS 30 GAUGE
EACH MISCELLANEOUS
Qty: 30 EACH | Refills: 3 | Status: SHIPPED | OUTPATIENT
Start: 2023-09-01

## 2023-09-01 ASSESSMENT — ENCOUNTER SYMPTOMS
FEVER: 0
VOMITING: 0
PALPITATIONS: 0
CONSTIPATION: 0
SHORTNESS OF BREATH: 0
DIZZINESS: 0
CHILLS: 0
BLOOD IN STOOL: 0
LOSS OF CONSCIOUSNESS: 0
DEPRESSION: 0
SEIZURES: 0
NAUSEA: 0
ABDOMINAL PAIN: 0
HEARTBURN: 0
COUGH: 0
HEADACHES: 0
FALLS: 0
NERVOUS/ANXIOUS: 0

## 2023-09-01 ASSESSMENT — FIBROSIS 4 INDEX: FIB4 SCORE: 2.14

## 2023-09-01 NOTE — PROGRESS NOTES
Established Patient    Patient Care Team:  Kalyan Taylor D.O. as PCP - General (Internal Medicine)    Ajit Gregory is a 62 y.o. 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    HPI:  Mr. Gregory is a 62-year-old male here for routine follow-up visit.  Recent testing demonstrated that patient is a heterozygote for factor V Leiden, explaining his previous DVT of LLE (2020), and SMV thrombosis (2021).  Currently on Xarelto, tolerating without issues at this time.  Patient has been continually physically active, walking 7-8000 steps on average daily, running 2-3 miles daily.  Has been having recurrent lower back and left buttock pain secondary to degenerative disease (status post posterior lumbar fusion, right SI joint fusion).  Also recent diagnosis of Angela type I, with patient compliant with insulin glargine 12 units q. evening.  Blood sugars have been ranging between 100s-180s on average.  Has had 2 Premeal sugars in the 80s, however denying overt hypoglycemia symptoms at this time.  Also is not interested in continuous glucose monitoring (i.e. Dexcom/maryann).  Has scheduled second opinion with a different orthopedic surgeon on October 12 regarding necessity for left SI joint fusion.    Review of Systems   Constitutional:  Negative for chills and fever.   HENT:  Negative for hearing loss.    Respiratory:  Negative for cough and shortness of breath.    Cardiovascular:  Negative for chest pain and palpitations.   Gastrointestinal:  Negative for abdominal pain, blood in stool, constipation, heartburn, nausea and vomiting.   Genitourinary:  Negative for dysuria and hematuria.   Musculoskeletal:  Negative for falls.   Neurological:  Negative for dizziness, seizures, loss of consciousness and headaches.    Psychiatric/Behavioral:  Negative for depression. The patient is not nervous/anxious.        Past Medical History:   Diagnosis Date    Diabetes (HCC)     DVT (deep venous thrombosis) (HCC)     Hyponatremia 9/22/2021    Nausea 9/23/2021     Social History     Tobacco Use    Smoking status: Former     Current packs/day: 2.00     Average packs/day: 2.0 packs/day for 35.0 years (70.0 ttl pk-yrs)     Types: Cigarettes    Smokeless tobacco: Never   Vaping Use    Vaping Use: Never used   Substance Use Topics    Alcohol use: Not Currently    Drug use: Yes     Types: Marijuana, Inhaled     Comment: daily     Current Outpatient Medications   Medication Sig Dispense Refill    Alcohol Swabs Wipe site with prep pad prior to injection. 100 Each 0    Lancets Lancets order: Lancets for covered glucometer. Sig: use in morning and once in the evening 30 Each 3    Blood Glucose Test Strips Use one strip once in the morning and one in the evening. 100 Strip 0    Insulin Pen Needle 32 G x 4 mm Use one pen needle in pen device to inject insulin once daily. 100 Each 0    tamsulosin (FLOMAX) 0.4 MG capsule Take 1 Capsule by mouth 1/2 hour after breakfast. 30 Capsule 0    magnesium gluconate (MAG-G) 500 MG tablet Take 500 mg by mouth 3 times a day.      insulin glargine 100 UNIT/ML SC SOPN injection Inject 12 Units under the skin every evening. 3 mL 1    atorvastatin (LIPITOR) 20 MG Tab Take 1 Tablet by mouth every evening. 30 Tablet 3    XARELTO 20 MG Tab tablet TAKE 1 TABLET BY MOUTH EVERY DAY WITH DINNER 30 Tablet 1    Blood Glucose Meter Kit Check blood sugar once in the morning and once in the evening. 1 Kit 0    metFORMIN (GLUCOPHAGE) 500 MG Tab Take 1 Tablet by mouth 2 times a day. 30 Tablet 1    TRUE METRIX BLOOD GLUCOSE TEST strip USE ONE STRIP ONCE IN THE MORNING AND ONE IN THE EVENING.       No current facility-administered medications for this visit.       BP (!) 142/67 (BP Location: Right arm, Patient Position: Sitting, BP  Cuff Size: Adult)   Pulse 81   Temp 36.7 °C (98 °F) (Temporal)   Wt 105 kg (232 lb 6.4 oz)   SpO2 96%   BMI 28.29 kg/m²   Physical Exam  Constitutional:       General: He is not in acute distress.     Appearance: He is normal weight.   HENT:      Head: Normocephalic and atraumatic.      Mouth/Throat:      Mouth: Mucous membranes are moist.   Eyes:      Conjunctiva/sclera: Conjunctivae normal.   Cardiovascular:      Rate and Rhythm: Normal rate and regular rhythm.      Pulses: Normal pulses.      Heart sounds: Normal heart sounds. No murmur heard.     No friction rub. No gallop.   Pulmonary:      Effort: Pulmonary effort is normal.      Breath sounds: Normal breath sounds. No wheezing, rhonchi or rales.   Abdominal:      General: Abdomen is flat. Bowel sounds are normal.      Palpations: Abdomen is soft.      Tenderness: There is no abdominal tenderness. There is no guarding or rebound.   Musculoskeletal:      Right lower leg: No edema.      Left lower leg: No edema.   Neurological:      General: No focal deficit present.      Mental Status: He is alert and oriented to person, place, and time.   Psychiatric:         Mood and Affect: Mood normal.         Behavior: Behavior normal.         Assessment and Plan:     Factor V Leiden ([facility])  Heterozygote for factor V Leiden per hypercoagulable work-up.  Currently on Xarelto given previous DVT LLE (2020) SMV thrombosis (2021).  Tolerating medication well without complaints, no signs/symptoms of repeat VTE.    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  Currently being managed with insulin glargine 12 units nightly and metformin 500 mg twice daily.  BG is well controlled and ranged between 100s-180s regularly.Last A1c 10.7% (7/27/23).    -Continue insulin glargine 12 units nightly  -Continue metformin 500  milligram twice daily  -Continue twice daily blood sugar checks with glucometer  -Discussed continuous glucose monitoring with patient, uninterested at this time.  We will reassess in following visits    Healthcare maintenance  -Advised to follow-up with GI for colonoscopy, referral placed in previous visit with department phone number/information provided      Orders Placed This Encounter    Alcohol Swabs    Lancets    TRUE METRIX BLOOD GLUCOSE TEST strip       Return in about 3 months (around 12/1/2023).    Kalyan Taylor D.O. PGY II  Internal Medicine  [facility]

## 2023-09-01 NOTE — ASSESSMENT & PLAN NOTE
MISA-7 antibody positive, lot of type I.  Currently being managed with insulin glargine 12 units nightly and metformin 500 mg twice daily.  BG is well controlled and ranged between 100s-180s regularly.Last A1c 10.7% (7/27/23).    -Continue insulin glargine 12 units nightly  -Continue metformin 500 milligram twice daily  -Continue twice daily blood sugar checks with glucometer  -Discussed continuous glucose monitoring with patient, uninterested at this time.  We will reassess in following visits

## 2023-09-01 NOTE — ASSESSMENT & PLAN NOTE
-Advised to follow-up with GI for colonoscopy, referral placed in previous visit with department phone number/information provided

## 2023-09-01 NOTE — ASSESSMENT & PLAN NOTE
Heterozygote for factor V Leiden per hypercoagulable work-up.  Currently on Xarelto given previous DVT LLE (2020) SMV thrombosis (2021).  Tolerating medication well without complaints, no signs/symptoms of repeat VTE.    -Continue Xarelto 20 mg daily indefinitely  -We will continue to closely monitor for recurrent VTE

## 2023-09-01 NOTE — PATIENT INSTRUCTIONS
Gastroenterology:     [facility]  [address] [place] [address]  Phone: [phone]

## 2023-10-03 DIAGNOSIS — K55.069 SUPERIOR MESENTERIC VEIN THROMBOSIS (HCC): ICD-10-CM

## 2023-10-04 RX ORDER — RIVAROXABAN 20 MG/1
20 TABLET, FILM COATED ORAL
Qty: 30 TABLET | Refills: 1 | Status: SHIPPED | OUTPATIENT
Start: 2023-10-04 | End: 2023-12-19

## 2023-10-23 RX ORDER — CALCIUM CITRATE/VITAMIN D3 200MG-6.25
TABLET ORAL
Qty: 50 STRIP | Refills: 1 | Status: SHIPPED | OUTPATIENT
Start: 2023-10-23 | End: 2023-12-18

## 2023-11-21 DIAGNOSIS — E13.9 LATENT AUTOIMMUNE DIABETES IN ADULTS (LADA), MANAGED AS TYPE 1 (HCC): ICD-10-CM

## 2023-11-21 RX ORDER — ATORVASTATIN CALCIUM 20 MG/1
20 TABLET, FILM COATED ORAL EVERY EVENING
Qty: 30 TABLET | Refills: 3 | Status: SHIPPED | OUTPATIENT
Start: 2023-11-21

## 2023-12-01 ENCOUNTER — OFFICE VISIT (OUTPATIENT)
Dept: INTERNAL MEDICINE | Facility: OTHER | Age: 63
End: 2023-12-01
Payer: COMMERCIAL

## 2023-12-01 VITALS
SYSTOLIC BLOOD PRESSURE: 141 MMHG | DIASTOLIC BLOOD PRESSURE: 78 MMHG | HEART RATE: 84 BPM | TEMPERATURE: 98.4 F | OXYGEN SATURATION: 97 % | WEIGHT: 227 LBS | BODY MASS INDEX: 27.64 KG/M2 | HEIGHT: 76 IN

## 2023-12-01 DIAGNOSIS — Z87.891 HISTORY OF TOBACCO ABUSE: ICD-10-CM

## 2023-12-01 DIAGNOSIS — I15.2 HYPERTENSION DUE TO ENDOCRINE DISORDER: ICD-10-CM

## 2023-12-01 DIAGNOSIS — E13.9 LATENT AUTOIMMUNE DIABETES IN ADULTS (LADA), MANAGED AS TYPE 1 (HCC): ICD-10-CM

## 2023-12-01 PROBLEM — I10 HYPERTENSION: Status: ACTIVE | Noted: 2023-12-01

## 2023-12-01 LAB
HBA1C MFR BLD: 5.7 % (ref ?–5.8)
POCT INT CON NEG: NEGATIVE
POCT INT CON POS: POSITIVE

## 2023-12-01 PROCEDURE — 99214 OFFICE O/P EST MOD 30 MIN: CPT | Mod: GC

## 2023-12-01 PROCEDURE — 3077F SYST BP >= 140 MM HG: CPT

## 2023-12-01 PROCEDURE — 3078F DIAST BP <80 MM HG: CPT

## 2023-12-01 PROCEDURE — 83036 HEMOGLOBIN GLYCOSYLATED A1C: CPT

## 2023-12-01 RX ORDER — LISINOPRIL 10 MG/1
5 TABLET ORAL DAILY
Qty: 30 TABLET | Refills: 1 | Status: SHIPPED
Start: 2023-12-01

## 2023-12-01 RX ORDER — LISINOPRIL 10 MG/1
5 TABLET ORAL DAILY
Qty: 30 TABLET | Refills: 1 | Status: SHIPPED | OUTPATIENT
Start: 2023-12-01 | End: 2023-12-01 | Stop reason: SDUPTHER

## 2023-12-01 ASSESSMENT — ENCOUNTER SYMPTOMS
BLOOD IN STOOL: 0
HEADACHES: 0
PALPITATIONS: 0
FEVER: 0
LOSS OF CONSCIOUSNESS: 0
DIZZINESS: 0
CHILLS: 0
BLURRED VISION: 0
DEPRESSION: 0
SHORTNESS OF BREATH: 0
HEARTBURN: 0
SEIZURES: 0
ABDOMINAL PAIN: 0
NAUSEA: 0
VOMITING: 0
FALLS: 0
NERVOUS/ANXIOUS: 0

## 2023-12-01 ASSESSMENT — FIBROSIS 4 INDEX: FIB4 SCORE: 2.14

## 2023-12-01 NOTE — PATIENT INSTRUCTIONS
Please obtain BP monitor, begin recording a couple times a week in the morning. We started lisinopril 5 mg daily for your for your blood pressure. Limit your salt intake to a quarter teaspoon daily.    Please continue taking your lipitor

## 2023-12-01 NOTE — ASSESSMENT & PLAN NOTE
History of ANGELA Type 1 (MISA-7 positive).  Previously on metformin 500 mg twice daily, insulin 12 units every evening.  Was told A1c went from the tens down to the fours by his orthopedic surgeon, therefore stopped taking his metformin.  Has not followed up with an eye doctor for retinal screening, nor has had his teeth checked. POCT A1c 5.7% down from 10.7%.    -Decreasing insulin glargine to 10 units every evening  -Discontinue metformin  -Continue daily BG checks  -Added lisinopril for nephro-protective benefits  -Referred to ophthalmology for retinal screening  -Advised to follow up with dentist  -CMP, microalbumin:cr ratio ordered  -Declining monofilament exam this visit

## 2023-12-01 NOTE — PROGRESS NOTES
Established Patient    Patient Care Team:  Kalyan Taylor D.O. as PCP - General (Internal Medicine)    Ajit Gregory is a 62 y.o. 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  Patient presenting for routine follow-up visit.  Recently had his left SI joint fused on 11/3/2023 with orthopedic surgery.  No complaints at this time.  Blood sugars have primarily been in the low 100s every day, with 2 bouts of blood sugars reaching high 80s.  Was told by orthopedic surgery that his A1c was in the high fours on their laboratory check.  POCT A1c demonstrating A1c of 5.7%.  Patient has discontinued use of his metformin, and has not been taking his Lipitor either.  No other complaints at this time.    Review of Systems   Constitutional:  Negative for chills and fever.   HENT:  Negative for hearing loss.    Eyes:  Negative for blurred vision.   Respiratory:  Negative for shortness of breath.    Cardiovascular:  Negative for chest pain and palpitations.   Gastrointestinal:  Negative for abdominal pain, blood in stool, heartburn, melena, nausea and vomiting.   Genitourinary:  Negative for dysuria and hematuria.   Musculoskeletal:  Negative for falls.   Neurological:  Negative for dizziness, seizures, loss of consciousness and headaches.   Psychiatric/Behavioral:  Negative for depression. The patient is not nervous/anxious.        Past Medical History:   Diagnosis Date    Diabetes (HCC)     DVT (deep venous thrombosis) (HCC)     Hyponatremia 9/22/2021    Nausea 9/23/2021     Social History     Tobacco Use    Smoking status: Former     Current packs/day: 2.00  "    Average packs/day: 2.0 packs/day for 35.0 years (70.0 ttl pk-yrs)     Types: Cigarettes    Smokeless tobacco: Never   Vaping Use    Vaping Use: Never used   Substance Use Topics    Alcohol use: Not Currently    Drug use: Yes     Types: Marijuana, Inhaled     Comment: daily     Current Outpatient Medications   Medication Sig Dispense Refill    lisinopril (PRINIVIL) 10 MG Tab Take 0.5 Tablets by mouth every day. 30 Tablet 1    insulin glargine 100 UNIT/ML SC SOPN injection Inject 10 Units under the skin every evening. 3 mL 1    TRUE METRIX BLOOD GLUCOSE TEST strip USE ONE STRIP ONCE IN THE MORNING AND ONE IN THE EVENING. 50 Strip 1    XARELTO 20 MG Tab tablet TAKE 1 TABLET BY MOUTH EVERY DAY WITH DINNER 30 Tablet 1    Alcohol Swabs Wipe site with prep pad prior to injection. 100 Each 0    Lancets Lancets order: Lancets for covered glucometer. Sig: use in morning and once in the evening 30 Each 3    Blood Glucose Test Strips Use one strip once in the morning and one in the evening. 100 Strip 0    Insulin Pen Needle 32 G x 4 mm Use one pen needle in pen device to inject insulin once daily. 100 Each 0    tamsulosin (FLOMAX) 0.4 MG capsule Take 1 Capsule by mouth 1/2 hour after breakfast. 30 Capsule 0    magnesium gluconate (MAG-G) 500 MG tablet Take 500 mg by mouth 3 times a day.      Blood Glucose Meter Kit Check blood sugar once in the morning and once in the evening. 1 Kit 0    atorvastatin (LIPITOR) 20 MG Tab TAKE 1 TABLET BY MOUTH EVERY DAY IN THE EVENING (Patient not taking: Reported on 12/1/2023) 30 Tablet 3     No current facility-administered medications for this visit.       BP (!) 141/78 (BP Location: Left arm, Patient Position: Sitting, BP Cuff Size: Adult)   Pulse 84   Temp 36.9 °C (98.4 °F) (Temporal)   Ht 1.93 m (6' 4\")   Wt 103 kg (227 lb)   SpO2 97%   BMI 27.63 kg/m²   Physical Exam  Constitutional:       General: He is not in acute distress.  HENT:      Head: Atraumatic.      Mouth/Throat:     "  Mouth: Mucous membranes are moist.   Eyes:      Conjunctiva/sclera: Conjunctivae normal.   Cardiovascular:      Rate and Rhythm: Normal rate and regular rhythm.      Pulses: Normal pulses.      Heart sounds: No murmur heard.     No friction rub. No gallop.   Pulmonary:      Effort: Pulmonary effort is normal.      Breath sounds: Normal breath sounds. No wheezing, rhonchi or rales.   Abdominal:      General: Abdomen is flat. Bowel sounds are normal.      Palpations: Abdomen is soft.      Tenderness: There is no abdominal tenderness. There is no guarding or rebound.   Musculoskeletal:      Right lower leg: No edema.      Left lower leg: No edema.   Skin:     General: Skin is warm.   Neurological:      General: No focal deficit present.      Mental Status: He is alert and oriented to person, place, and time.   Psychiatric:         Mood and Affect: Mood normal.         Behavior: Behavior normal.         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  Previously on metformin 500 mg twice daily, insulin 12 units every evening.  Was told A1c went from the tens down to the fours by his orthopedic surgeon, therefore stopped taking his metformin.  Has not followed up with an eye doctor for retinal screening, nor has had his teeth checked. POCT A1c 5.7% down from 10.7%.    -Decreasing insulin glargine to 10 units every evening  -Discontinue metformin  -Continue daily BG checks  -Added lisinopril for nephro-protective benefits  -Referred to ophthalmology for retinal screening  -Advised to follow up with dentist  -CMP, microalbumin:cr ratio ordered  -Declining monofilament exam this visit    Hypertension  /78 this vist, SBP last visit also in the 140s, likely secondary to ANGELA type 1.     -Begun on lisinopril 5 mg daily  -CMP in 1 week   -Low sodium limit to 1.5 g daily  -Advised to obtain BP monitor and routinely check     History of  tobacco abuse  70 pack year Hx, quit 10 years ago.    -Referred to lung cancer screening program       Orders Placed This Encounter    MICROALBUMIN CREAT RATIO URINE    Comp Metabolic Panel    REFERRAL TO LUNG CANCER SCREENING PROGRAM    Referral to Ophthalmology    POCT  A1C    DISCONTD: lisinopril (PRINIVIL) 10 MG Tab    lisinopril (PRINIVIL) 10 MG Tab    insulin glargine 100 UNIT/ML SC SOPN injection       Return in about 5 weeks (around 1/5/2024).    Kalyan Taylor D.O. PGY II  Internal Medicine  [facility]

## 2023-12-01 NOTE — ASSESSMENT & PLAN NOTE
/78 this vist, SBP last visit also in the 140s, likely secondary to ANGELA type 1.     -Begun on lisinopril 5 mg daily  -CMP in 1 week   -Low sodium limit to 1.5 g daily  -Advised to obtain BP monitor and routinely check

## 2023-12-18 DIAGNOSIS — K55.069 SUPERIOR MESENTERIC VEIN THROMBOSIS (HCC): ICD-10-CM

## 2023-12-18 RX ORDER — CALCIUM CITRATE/VITAMIN D3 200MG-6.25
TABLET ORAL
Qty: 50 STRIP | Refills: 1 | Status: SHIPPED | OUTPATIENT
Start: 2023-12-18

## 2023-12-19 RX ORDER — RIVAROXABAN 20 MG/1
20 TABLET, FILM COATED ORAL
Qty: 30 TABLET | Refills: 1 | Status: SHIPPED | OUTPATIENT
Start: 2023-12-19 | End: 2024-03-01

## 2024-02-28 DIAGNOSIS — K55.069 SUPERIOR MESENTERIC VEIN THROMBOSIS (HCC): ICD-10-CM

## 2024-02-29 NOTE — TELEPHONE ENCOUNTER
Received request via: Pharmacy    Was the patient seen in the last year in this department? Yes    Does the patient have an active prescription (recently filled or refills available) for medication(s) requested? No    Ph[facility] Name[facility]    Does the patient have [facility] and need 100 day supply (blood pressure, diabetes and cholesterol meds only)? Patient does not have SCP

## 2024-03-01 RX ORDER — RIVAROXABAN 20 MG/1
20 TABLET, FILM COATED ORAL
Qty: 30 TABLET | Refills: 1 | Status: SHIPPED | OUTPATIENT
Start: 2024-03-01 | End: 2024-03-27

## 2024-03-08 ENCOUNTER — HOSPITAL ENCOUNTER (EMERGENCY)
Facility: MEDICAL CENTER | Age: 64
End: 2024-03-08
Attending: EMERGENCY MEDICINE | Admitting: HOSPITALIST
Payer: COMMERCIAL

## 2024-03-08 ENCOUNTER — APPOINTMENT (OUTPATIENT)
Dept: RADIOLOGY | Facility: MEDICAL CENTER | Age: 64
End: 2024-03-08
Attending: EMERGENCY MEDICINE
Payer: COMMERCIAL

## 2024-03-08 VITALS
DIASTOLIC BLOOD PRESSURE: 90 MMHG | HEART RATE: 83 BPM | HEIGHT: 76 IN | RESPIRATION RATE: 18 BRPM | BODY MASS INDEX: 27.76 KG/M2 | SYSTOLIC BLOOD PRESSURE: 143 MMHG | WEIGHT: 227.96 LBS | OXYGEN SATURATION: 98 % | TEMPERATURE: 98.4 F

## 2024-03-08 DIAGNOSIS — L02.91 ABSCESS: ICD-10-CM

## 2024-03-08 DIAGNOSIS — L03.818 CELLULITIS OF OTHER SPECIFIED SITE: ICD-10-CM

## 2024-03-08 DIAGNOSIS — E13.69 OTHER SPECIFIED DIABETES MELLITUS WITH OTHER SPECIFIED COMPLICATION, WITHOUT LONG-TERM CURRENT USE OF INSULIN (HCC): ICD-10-CM

## 2024-03-08 DIAGNOSIS — Z79.01 CHRONIC ANTICOAGULATION: ICD-10-CM

## 2024-03-08 PROBLEM — R74.8 ELEVATED LIVER ENZYMES: Status: ACTIVE | Noted: 2024-03-08

## 2024-03-08 PROBLEM — L02.214 GROIN ABSCESS: Status: ACTIVE | Noted: 2024-03-08

## 2024-03-08 LAB
ALBUMIN SERPL BCP-MCNC: 4.1 G/DL (ref 3.2–4.9)
ALBUMIN/GLOB SERPL: 1.5 G/DL
ALP SERPL-CCNC: 149 U/L (ref 30–99)
ALT SERPL-CCNC: 54 U/L (ref 2–50)
ANION GAP SERPL CALC-SCNC: 9 MMOL/L (ref 7–16)
AST SERPL-CCNC: 99 U/L (ref 12–45)
BASOPHILS # BLD AUTO: 0.1 % (ref 0–1.8)
BASOPHILS # BLD: 0.01 K/UL (ref 0–0.12)
BILIRUB SERPL-MCNC: 0.4 MG/DL (ref 0.1–1.5)
BUN SERPL-MCNC: 14 MG/DL (ref 8–22)
CALCIUM ALBUM COR SERPL-MCNC: 8.8 MG/DL (ref 8.5–10.5)
CALCIUM SERPL-MCNC: 8.9 MG/DL (ref 8.4–10.2)
CHLORIDE SERPL-SCNC: 101 MMOL/L (ref 96–112)
CO2 SERPL-SCNC: 24 MMOL/L (ref 20–33)
CREAT SERPL-MCNC: 0.95 MG/DL (ref 0.5–1.4)
EOSINOPHIL # BLD AUTO: 0.12 K/UL (ref 0–0.51)
EOSINOPHIL NFR BLD: 1.7 % (ref 0–6.9)
ERYTHROCYTE [DISTWIDTH] IN BLOOD BY AUTOMATED COUNT: 50.4 FL (ref 35.9–50)
GFR SERPLBLD CREATININE-BSD FMLA CKD-EPI: 90 ML/MIN/1.73 M 2
GLOBULIN SER CALC-MCNC: 2.8 G/DL (ref 1.9–3.5)
GLUCOSE SERPL-MCNC: 129 MG/DL (ref 65–99)
HCT VFR BLD AUTO: 46.6 % (ref 42–52)
HGB BLD-MCNC: 16.8 G/DL (ref 14–18)
IMM GRANULOCYTES # BLD AUTO: 0.03 K/UL (ref 0–0.11)
IMM GRANULOCYTES NFR BLD AUTO: 0.4 % (ref 0–0.9)
LYMPHOCYTES # BLD AUTO: 1.27 K/UL (ref 1–4.8)
LYMPHOCYTES NFR BLD: 18.1 % (ref 22–41)
MCH RBC QN AUTO: 33.5 PG (ref 27–33)
MCHC RBC AUTO-ENTMCNC: 36.1 G/DL (ref 32.3–36.5)
MCV RBC AUTO: 93 FL (ref 81.4–97.8)
MONOCYTES # BLD AUTO: 0.43 K/UL (ref 0–0.85)
MONOCYTES NFR BLD AUTO: 6.1 % (ref 0–13.4)
NEUTROPHILS # BLD AUTO: 5.14 K/UL (ref 1.82–7.42)
NEUTROPHILS NFR BLD: 73.6 % (ref 44–72)
NRBC # BLD AUTO: 0 K/UL
NRBC BLD-RTO: 0 /100 WBC (ref 0–0.2)
PLATELET # BLD AUTO: 134 K/UL (ref 164–446)
PMV BLD AUTO: 10.5 FL (ref 9–12.9)
POTASSIUM SERPL-SCNC: 4.6 MMOL/L (ref 3.6–5.5)
PROT SERPL-MCNC: 6.9 G/DL (ref 6–8.2)
RBC # BLD AUTO: 5.01 M/UL (ref 4.7–6.1)
SODIUM SERPL-SCNC: 134 MMOL/L (ref 135–145)
WBC # BLD AUTO: 7 K/UL (ref 4.8–10.8)

## 2024-03-08 PROCEDURE — 80053 COMPREHEN METABOLIC PANEL: CPT

## 2024-03-08 PROCEDURE — 72193 CT PELVIS W/DYE: CPT

## 2024-03-08 PROCEDURE — 700101 HCHG RX REV CODE 250: Performed by: EMERGENCY MEDICINE

## 2024-03-08 PROCEDURE — 700117 HCHG RX CONTRAST REV CODE 255: Performed by: EMERGENCY MEDICINE

## 2024-03-08 PROCEDURE — 85025 COMPLETE CBC W/AUTO DIFF WBC: CPT

## 2024-03-08 PROCEDURE — 99223 1ST HOSP IP/OBS HIGH 75: CPT | Performed by: HOSPITALIST

## 2024-03-08 PROCEDURE — 96365 THER/PROPH/DIAG IV INF INIT: CPT | Mod: XU

## 2024-03-08 PROCEDURE — 303977 HCHG I & D

## 2024-03-08 PROCEDURE — 700111 HCHG RX REV CODE 636 W/ 250 OVERRIDE (IP): Mod: JZ | Performed by: EMERGENCY MEDICINE

## 2024-03-08 PROCEDURE — 36415 COLL VENOUS BLD VENIPUNCTURE: CPT

## 2024-03-08 PROCEDURE — 700105 HCHG RX REV CODE 258: Performed by: EMERGENCY MEDICINE

## 2024-03-08 PROCEDURE — 99284 EMERGENCY DEPT VISIT MOD MDM: CPT

## 2024-03-08 RX ORDER — PROCHLORPERAZINE EDISYLATE 5 MG/ML
5-10 INJECTION INTRAMUSCULAR; INTRAVENOUS EVERY 4 HOURS PRN
Status: DISCONTINUED | OUTPATIENT
Start: 2024-03-08 | End: 2024-03-08 | Stop reason: HOSPADM

## 2024-03-08 RX ORDER — PROMETHAZINE HYDROCHLORIDE 25 MG/1
12.5-25 TABLET ORAL EVERY 4 HOURS PRN
Status: DISCONTINUED | OUTPATIENT
Start: 2024-03-08 | End: 2024-03-08 | Stop reason: HOSPADM

## 2024-03-08 RX ORDER — LABETALOL HYDROCHLORIDE 5 MG/ML
10 INJECTION, SOLUTION INTRAVENOUS EVERY 4 HOURS PRN
Status: DISCONTINUED | OUTPATIENT
Start: 2024-03-08 | End: 2024-03-08 | Stop reason: HOSPADM

## 2024-03-08 RX ORDER — ONDANSETRON 2 MG/ML
4 INJECTION INTRAMUSCULAR; INTRAVENOUS EVERY 4 HOURS PRN
Status: DISCONTINUED | OUTPATIENT
Start: 2024-03-08 | End: 2024-03-08 | Stop reason: HOSPADM

## 2024-03-08 RX ORDER — TAMSULOSIN HYDROCHLORIDE 0.4 MG/1
0.4 CAPSULE ORAL
Status: DISCONTINUED | OUTPATIENT
Start: 2024-03-09 | End: 2024-03-08 | Stop reason: HOSPADM

## 2024-03-08 RX ORDER — LIDOCAINE HYDROCHLORIDE 20 MG/ML
20 INJECTION, SOLUTION INFILTRATION; PERINEURAL ONCE
Status: COMPLETED | OUTPATIENT
Start: 2024-03-08 | End: 2024-03-08

## 2024-03-08 RX ORDER — ATORVASTATIN CALCIUM 10 MG/1
20 TABLET, FILM COATED ORAL EVERY EVENING
Status: DISCONTINUED | OUTPATIENT
Start: 2024-03-08 | End: 2024-03-08 | Stop reason: HOSPADM

## 2024-03-08 RX ORDER — OXYCODONE HYDROCHLORIDE 5 MG/1
2.5 TABLET ORAL
Status: DISCONTINUED | OUTPATIENT
Start: 2024-03-08 | End: 2024-03-08 | Stop reason: HOSPADM

## 2024-03-08 RX ORDER — SODIUM CHLORIDE 9 MG/ML
INJECTION, SOLUTION INTRAVENOUS CONTINUOUS
Status: DISCONTINUED | OUTPATIENT
Start: 2024-03-08 | End: 2024-03-08 | Stop reason: HOSPADM

## 2024-03-08 RX ORDER — SULFAMETHOXAZOLE AND TRIMETHOPRIM 800; 160 MG/1; MG/1
1 TABLET ORAL ONCE
Status: DISCONTINUED | OUTPATIENT
Start: 2024-03-08 | End: 2024-03-08 | Stop reason: HOSPADM

## 2024-03-08 RX ORDER — LISINOPRIL 5 MG/1
5 TABLET ORAL DAILY
Status: DISCONTINUED | OUTPATIENT
Start: 2024-03-09 | End: 2024-03-08 | Stop reason: HOSPADM

## 2024-03-08 RX ORDER — OXYCODONE HYDROCHLORIDE 5 MG/1
5 TABLET ORAL
Status: DISCONTINUED | OUTPATIENT
Start: 2024-03-08 | End: 2024-03-08 | Stop reason: HOSPADM

## 2024-03-08 RX ORDER — LIDOCAINE HYDROCHLORIDE AND EPINEPHRINE 10; 10 MG/ML; UG/ML
10 INJECTION, SOLUTION INFILTRATION; PERINEURAL ONCE
Status: DISCONTINUED | OUTPATIENT
Start: 2024-03-08 | End: 2024-03-08 | Stop reason: HOSPADM

## 2024-03-08 RX ORDER — ONDANSETRON 4 MG/1
4 TABLET, ORALLY DISINTEGRATING ORAL EVERY 4 HOURS PRN
Status: DISCONTINUED | OUTPATIENT
Start: 2024-03-08 | End: 2024-03-08 | Stop reason: HOSPADM

## 2024-03-08 RX ORDER — ACETAMINOPHEN 325 MG/1
650 TABLET ORAL EVERY 6 HOURS PRN
Status: DISCONTINUED | OUTPATIENT
Start: 2024-03-08 | End: 2024-03-08 | Stop reason: HOSPADM

## 2024-03-08 RX ORDER — SULFAMETHOXAZOLE AND TRIMETHOPRIM 800; 160 MG/1; MG/1
1 TABLET ORAL 2 TIMES DAILY
Qty: 14 TABLET | Refills: 0 | Status: ACTIVE | OUTPATIENT
Start: 2024-03-08 | End: 2024-03-15

## 2024-03-08 RX ORDER — POLYETHYLENE GLYCOL 3350 17 G/17G
1 POWDER, FOR SOLUTION ORAL
Status: DISCONTINUED | OUTPATIENT
Start: 2024-03-08 | End: 2024-03-08 | Stop reason: HOSPADM

## 2024-03-08 RX ORDER — PROMETHAZINE HYDROCHLORIDE 25 MG/1
12.5-25 SUPPOSITORY RECTAL EVERY 4 HOURS PRN
Status: DISCONTINUED | OUTPATIENT
Start: 2024-03-08 | End: 2024-03-08 | Stop reason: HOSPADM

## 2024-03-08 RX ORDER — AMOXICILLIN AND CLAVULANATE POTASSIUM 875; 125 MG/1; MG/1
1 TABLET, FILM COATED ORAL 2 TIMES DAILY
Qty: 14 TABLET | Refills: 0 | Status: ACTIVE | OUTPATIENT
Start: 2024-03-08 | End: 2024-03-15

## 2024-03-08 RX ORDER — HYDROMORPHONE HYDROCHLORIDE 1 MG/ML
0.25 INJECTION, SOLUTION INTRAMUSCULAR; INTRAVENOUS; SUBCUTANEOUS
Status: DISCONTINUED | OUTPATIENT
Start: 2024-03-08 | End: 2024-03-08 | Stop reason: HOSPADM

## 2024-03-08 RX ORDER — AMOXICILLIN 250 MG
2 CAPSULE ORAL EVERY EVENING
Status: DISCONTINUED | OUTPATIENT
Start: 2024-03-08 | End: 2024-03-08 | Stop reason: HOSPADM

## 2024-03-08 RX ADMIN — LIDOCAINE HYDROCHLORIDE 20 ML: 20 INJECTION, SOLUTION INFILTRATION; PERINEURAL at 14:30

## 2024-03-08 RX ADMIN — IOHEXOL 100 ML: 350 INJECTION, SOLUTION INTRAVENOUS at 16:43

## 2024-03-08 RX ADMIN — AMPICILLIN AND SULBACTAM 3 G: 1; 2 INJECTION, POWDER, FOR SOLUTION INTRAMUSCULAR; INTRAVENOUS at 14:46

## 2024-03-08 ASSESSMENT — ENCOUNTER SYMPTOMS
BRUISES/BLEEDS EASILY: 0
PSYCHIATRIC NEGATIVE: 1
LOSS OF CONSCIOUSNESS: 0
DIARRHEA: 0
EYES NEGATIVE: 1
NEUROLOGICAL NEGATIVE: 1
SEIZURES: 0
GASTROINTESTINAL NEGATIVE: 1
PALPITATIONS: 0
WHEEZING: 0
NERVOUS/ANXIOUS: 0
RESPIRATORY NEGATIVE: 1
HEADACHES: 0
FEVER: 0
DOUBLE VISION: 0
CARDIOVASCULAR NEGATIVE: 1
CONSTIPATION: 0
HEMOPTYSIS: 0
COUGH: 0
NAUSEA: 0
VOMITING: 0
ABDOMINAL PAIN: 0
DIZZINESS: 0
CHILLS: 0
MYALGIAS: 1
BACK PAIN: 1
FOCAL WEAKNESS: 0
BLOOD IN STOOL: 0
DIAPHORESIS: 0
CONSTITUTIONAL NEGATIVE: 1
HEARTBURN: 0

## 2024-03-08 ASSESSMENT — FIBROSIS 4 INDEX: FIB4 SCORE: 2.18

## 2024-03-08 ASSESSMENT — VISUAL ACUITY: OU: 1

## 2024-03-08 NOTE — ED TRIAGE NOTES
Pt ambulates to triage with c/o an abscess along his right thigh and groin. He states a year ago he had an I&D performed on this area and is in today concerned that it's come back.

## 2024-03-08 NOTE — ED PROVIDER NOTES
ED Provider Note    CHIEF COMPLAINT  Chief Complaint   Patient presents with    Wound Check       EXTERNAL RECORDS REVIEWED  Reviewed previous ED note or abscess was incised and drained.    HPI/ROS  LIMITATION TO HISTORY   Select: : None  OUTSIDE HISTORIAN(S):  None    Ajit Gregory is a 63 y.o. male who presents to the emergency department complaining of a painful swollen area of infection in the right thigh.  The patient states he has had pain and swelling in the right eye before and had an abscess.  He states he was doing fairly well but has been exercising a lot in physical therapy and now feels like it infection has returned.  The patient denies any fevers or chills.  Discussed complaints of pain in the medial thigh tracks up his legs.  Denies any other aggravating leaving factors or associated complaints.  The patient reports history of diabetes not taking medications.    PAST MEDICAL HISTORY   has a past medical history of Diabetes ([facility]), DVT (deep venous thrombosis) ([facility]), Hyponatremia (9/22/2021), and Nausea (9/23/2021).    SURGICAL HISTORY   has a past surgical history that includes cervical disk and fusion anterior (2004); fusion, spine, lumbar, plif (2022); and fusion (Bilateral).    FAMILY HISTORY  Family History   Problem Relation Age of Onset    Breast Cancer Mother        SOCIAL HISTORY  Social History     Tobacco Use    Smoking status: Former     Current packs/day: 2.00     Average packs/day: 2.0 packs/day for 35.0 years (70.0 ttl pk-yrs)     Types: Cigarettes    Smokeless tobacco: Never   Vaping Use    Vaping Use: Never used   Substance and Sexual Activity    Alcohol use: Not Currently    Drug use: Yes     Types: Marijuana, Inhaled     Comment: daily    Sexual activity: Not on file       CURRENT MEDICATIONS  Home Medications    **Home medications have not yet been reviewed for this encounter**         ALLERGIES  No Known Allergies    PHYSICAL EXAM  VITAL SIGNS: BP (!) 143/87   Pulse 91    "Temp 36.7 °C (98 °F) (Temporal)   Resp 18   Ht 1.93 m (6' 4\")   Wt 103 kg (227 lb 15.3 oz)   SpO2 98%   BMI 27.75 kg/m²    Constitutional: Well developed, Well nourished, No acute distress, Non-toxic appearance.   HENT: Normocephalic, Atraumatic, Bilateral external ears normal, Oropharynx moist  Neck: Normal range of motion, No tenderness, Supple, No stridor.   Cardiovascular: Normal heart rate, Normal rhythm, No murmurs, No rubs, No gallops.   Thorax & Lungs: Normal breath sounds, No respiratory distress, No wheezing,  Abdomen: Soft, No tenderness  Skin: Warm, Dry, No erythema, No rash.  Musculoskeletal: Good range of motion in all major joints.  Area of induration swelling and fluctuance to the right thigh.  This is not in the scrotum or perineum.  To the proximal thigh.  The most posterior suspect is fluctuant.  There is induration more proximally.  Neurologic: Alert, , No focal deficits noted.   Psychiatric: Affect normal      DIAGNOSTIC STUDIES / PROCEDURES  Results for orders placed or performed during the hospital encounter of 03/08/24   CBC WITH DIFFERENTIAL   Result Value Ref Range    WBC 7.0 4.8 - 10.8 K/uL    RBC 5.01 4.70 - 6.10 M/uL    Hemoglobin 16.8 14.0 - 18.0 g/dL    Hematocrit 46.6 42.0 - 52.0 %    MCV 93.0 81.4 - 97.8 fL    MCH 33.5 (H) 27.0 - 33.0 pg    MCHC 36.1 32.3 - 36.5 g/dL    RDW 50.4 (H) 35.9 - 50.0 fL    Platelet Count 134 (L) 164 - 446 K/uL    MPV 10.5 9.0 - 12.9 fL    Neutrophils-Polys 73.60 (H) 44.00 - 72.00 %    Lymphocytes 18.10 (L) 22.00 - 41.00 %    Monocytes 6.10 0.00 - 13.40 %    Eosinophils 1.70 0.00 - 6.90 %    Basophils 0.10 0.00 - 1.80 %    Immature Granulocytes 0.40 0.00 - 0.90 %    Nucleated RBC 0.00 0.00 - 0.20 /100 WBC    Neutrophils (Absolute) 5.14 1.82 - 7.42 K/uL    Lymphs (Absolute) 1.27 1.00 - 4.80 K/uL    Monos (Absolute) 0.43 0.00 - 0.85 K/uL    Eos (Absolute) 0.12 0.00 - 0.51 K/uL    Baso (Absolute) 0.01 0.00 - 0.12 K/uL    Immature Granulocytes (abs) 0.03 " 0.00 - 0.11 K/uL    NRBC (Absolute) 0.00 K/uL   COMP METABOLIC PANEL   Result Value Ref Range    Sodium 134 (L) 135 - 145 mmol/L    Potassium 4.6 3.6 - 5.5 mmol/L    Chloride 101 96 - 112 mmol/L    Co2 24 20 - 33 mmol/L    Anion Gap 9.0 7.0 - 16.0    Glucose 129 (H) 65 - 99 mg/dL    Bun 14 8 - 22 mg/dL    Creatinine 0.95 0.50 - 1.40 mg/dL    Calcium 8.9 8.4 - 10.2 mg/dL    Correct Calcium 8.8 8.5 - 10.5 mg/dL    AST(SGOT) 99 (H) 12 - 45 U/L    ALT(SGPT) 54 (H) 2 - 50 U/L    Alkaline Phosphatase 149 (H) 30 - 99 U/L    Total Bilirubin 0.4 0.1 - 1.5 mg/dL    Albumin 4.1 3.2 - 4.9 g/dL    Total Protein 6.9 6.0 - 8.2 g/dL    Globulin 2.8 1.9 - 3.5 g/dL    A-G Ratio 1.5 g/dL   ESTIMATED GFR   Result Value Ref Range    GFR (CKD-EPI) 90 >60 mL/min/1.73 m 2        RADIOLOGY  I have independently interpreted the diagnostic imaging associated with this visit and am waiting the final reading from the radiologist.   My preliminary interpretation is as follows: I have reviewed the images and agree with radiologist interpretation    Radiologist interpretation:     CT-PELVIS WITH   Final Result         1.  Abscess in the proximal medial right thigh/inferior gluteal region measuring about 2.4 x 1.7 x 5.4 cm with associated cellulitis.   2.  No evidence of tract or association with the perianal region.   3.  Colonic diverticulosis without acute diverticulitis.            COURSE & MEDICAL DECISION MAKING    ED Observation Status? No; Patient does not meet criteria for ED Observation.     INITIAL ASSESSMENT, COURSE AND PLAN  Care Narrative:     63-year-old male presents with pain and swelling in the right medial thigh consistent with an area of infection and abscess.  He has had this in the past.  Seems to be more cellulitis and induration this was described in the past does not appear to be an isolated abscess.  The patient reports a history of diabetes.  He is not on any antihyperglycemic medications.  Could be a significant  infection.  He will be worked up with labs and imaging.      The patient's blood sugar relatively normal despite history of diabetes and not taking anything for hyperglycemia.    CT shows cellulitis and abscess.    After this workup I reassessed the patient and discussed performing incision and drainage of this abscess and he was agreeable.  He states he had it drained in the past.  He was agreeable with the plan.  Of also explained to the patient that he has cellulitis that would benefit from hospitalization he also has significant purulence and looks like tunneling and more than 1 abscess in the groin area that may require hospitalization.  He was agreeable to this plan as well.        Incision and Drainage Procedure Note    Indication: Abscess    Procedure: The patient was positioned appropriately and the skin over the incision site was prepped with Chloraprep. Local anesthesia was obtained by infiltration using 2% Lidocaine without epinephrine.  An incision was then made over the apex of the lesion and approximately 10 cc of thick material was expressed. Loculations were broken up using forceps and more of the material was able to be expressed. The drainage cavity was then packed with sterile gauze. The patient’s tetanus status was up to date and did not require a booster dose.  Second portion of the abscess is also fluctuant was anesthetized in the same fashion drained with an 11 blade and loculations were broken up and again this is packed with quarter inch plain packing gauze.    Patient was on Xarelto.  He had minimal bleeding the procedure.  Pressure was applied for 3 minutes and bleeding was controlled.  He had no bleeding through the dressing noted after several minutes.  No active hemorrhage.    The patient tolerated the procedure well..    Complications: None           After incision and drainage of the abscess I spoke with the surgeon Dr. Chatterjee.  I had the patient to be hospitalized for IV antibiotics  and recheck by the surgeon.  Is a fairly complicated infection with what appears to be more than 1 abscess.  Think he would benefit from hospitalization and IV antibiotics and possible surgical intervention if it does not have significant proved by the morning.  Dr. Chatterjee states he will see the patient tomorrow this is reasonable.    I spoke with the hospitalist Dr. Guzman and he went and saw the patient and apparently the patient developed some frustrations with staying in the hospital on leave AMA.        Back to reassess the patient he was very unhappy.  He did not express any unhappiness or concerns about staying in the hospital during my previous evaluations.  He was not happy with the procedure but now he wants to leave.    Explained to the patient that he should stay in the hospital because he has a significant infection may require further procedures and would benefit from IV antibiotics this could become a life or limb threatening infection.  The patient verbalized understanding states he does not want to stay.    Lengthy discussion with him.  I ordered an IV dose of antibiotics and an oral dose of Septra since he refuses to stay for Vanco.  The patient is refusing to stay.  Will put him on outpatient antibiotics.  He will be put on Augmentin because the proximity of the perineum and Septra.  This does not appear to be rectal or perirectal in nature.  It is not a perineal or scrotal abscess.    Despite lengthy discussion the patient remains agitated and does not want to stay in the hospital any further.  He signed out AGAINST MEDICAL ADVICE.  Is made aware that he can return if he would like further workup to complete his evaluation.  He states he understands.  He is advised to follow-up with his doctor return for symptoms or other concerns.  He is AGAINST MEDICAL ADVICE.    Patient was given discharge instructions and antibiotic prescriptions as well    ADDITIONAL PROBLEM LIST  Diabetes,  not currently on  medications.  Hypertension  DISPOSITION AND DISCUSSIONS  I have discussed management of the patient with the following physicians and BOZENA's: Surgery, hospitalist.      FINAL DIAGNOSIS  1. Cellulitis of other specified site    2. Abscess    3. Other specified diabetes mellitus with other specified complication, without long-term current use of insulin (HCC)    4. Chronic anticoagulation    5.  AGAINST MEDICAL ADVICE.       Electronically signed by: Keven Morin M.D., 3/8/2024 2:09 PM

## 2024-03-08 NOTE — ED NOTES
Called pt to room them in the appropriate room and they were no where to be found. Will try again shortly.

## 2024-03-09 NOTE — ASSESSMENT & PLAN NOTE
Secondary to hepatitis C most likely the patient has close elevated liver enzymes.  He denies alcohol use  Obtain a current hepatitis screen screen.

## 2024-03-09 NOTE — PROGRESS NOTES
In spite of multiple people explaining to the patient the treatment plan and the reason for treatment the patient decided to leave AGAINST MEDICAL ADVICE.  He was advised that this could lead to his demise and in spite of that he was willing to leave.  We told him if he should decide to change his mind he is welcome to come back.

## 2024-03-09 NOTE — ASSESSMENT & PLAN NOTE
Previously positive for hepatitis C not sure if the patient was actually treated for it as an outpatient

## 2024-03-09 NOTE — ASSESSMENT & PLAN NOTE
Hyponatremia most likely secondary to the infection  This is mild and indicative of dehydration at 134.  Give fluid resuscitation monitor sodium levels

## 2024-03-09 NOTE — PROGRESS NOTES
Pharmacy Vancomycin Kinetics Note for 3/8/2024     63 y.o. male on Vancomycin day # 1     Vancomycin Indication (AUC Dosing): Skin/skin structure infection      Provider specified end date: 03/15/24    Active Antibiotics (From admission, onward)      Ordered     Ordering Provider       Fri Mar 8, 2024  5:57 PM    03/08/24 1757  vancomycin 1500 mg/250mL NS IVPB premix  (vancomycin (VANCOCIN) IV (LD + Maintenance))  EVERY 12 HOURS         Mary Samson M.D.       Fri Mar 8, 2024  5:48 PM    03/08/24 1748  ampicillin/sulbactam (Unasyn) 3 g in  mL IVPB  EVERY 6 HOURS         Mary Samson M.D.    03/08/24 1748  MD Alert...Vancomycin per Pharmacy  PHARMACY TO DOSE        Question:  Indication(s) for vancomycin?  Answer:  Skin and soft tissue infection    Mary Samson M.D.       Fri Mar 8, 2024  5:43 PM    03/08/24 1743  vancomycin 2500 mg/500mL NS IVPB premix  (vancomycin (VANCOCIN) IV (LD + Maintenance))  ONCE         Keven Morin M.D.       Fri Mar 8, 2024  2:09 PM    03/08/24 1409  ampicillin/sulbactam (Unasyn) 3 g in  mL IVPB  ONCE         Keven Morin M.D.            Dosing Weight: 103 kg (227 lb 1.2 oz)      Admission History: Admitted on 3/8/2024 for Groin abscess [L02.214]  Pertinent history: Patient with abscess near groin with associated cellulitis. Empiric vancomycin and Unasyn initiated.    Allergies:     Patient has no known allergies.     Pertinent cultures to date:     Results       Procedure Component Value Units Date/Time    BLOOD CULTURE [[id number]]     Order Status: Sent Specimen: Blood from Peripheral     BLOOD CULTURE [[id number]]     Order Status: Sent Specimen: Blood from Peripheral     CULTURE WOUND W/ GRAM STAIN [[id number]]     Order Status: Sent Specimen: Wound from Abscess             Labs:     Estimated Creatinine Clearance: 99 mL/min (by C-G formula based on SCr of 0.95 mg/dL).  Recent Labs     03/08/24  1445   WBC 7.0   NEUTSPOLYS 73.60*     Recent Labs      "24  1445   BUN 14   CREATININE 0.95   ALBUMIN 4.1       Intake/Output Summary (Last 24 hours) at 3/8/2024 1757  Last data filed at 3/8/2024 1739  Gross per 24 hour   Intake 576.67 ml   Output --   Net 576.67 ml      BP (!) 143/87   Pulse 91   Temp 36.7 °C (98 °F) (Temporal)   Resp 18   Ht 1.93 m (6' 4\")   Wt 103 kg (227 lb 15.3 oz)   SpO2 98%  Temp (24hrs), Av.7 °C (98 °F), Min:36.7 °C (98 °F), Max:36.7 °C (98 °F)      List concerns for Vancomycin clearance:     Receipt of contrast dye    Pharmacokinetics: AUC Dosing    AUC kinetics:   Ke (hr ^-1): 0.0866 hr^-1  Half life: 8 hr  Clearance: 5.798  Estimated TDD: 2899  Estimated Dose: 1208  Estimated interval: 10      A/P:     -  Vancomycin dose: 1500mg q12h     -  Next vancomycin level(s): Not currently scheduled, consider after 4th maintenance dose.       -  Predicted vancomycin AUC from initial AUC test calculator: 517 mg·hr/L    -  Comments: MRSA nares ordered. Please continue to monitor renal function, urine output and vancomycin levels for dosing adjustments. Please also follow cultures and signs of clinical cure for de-escalation of therapy.       Kaylynn Martinez, PharmD    "

## 2024-03-09 NOTE — ASSESSMENT & PLAN NOTE
History of tobacco use denies current use.  He says he does use marijuana on a daily basis several times  Counseled on cessation

## 2024-03-09 NOTE — H&P
Hospital Medicine History & Physical Note    Date of Service  3/8/2024    Primary Care Physician  Kalyan Taylor D.O.    Consultants  general surgery    Specialist Names: Dr. Chatterjee    Code Status  Full Code    Chief Complaint  Chief Complaint   Patient presents with    Wound Check       History of Presenting Illness  Ajit Gregory is a 63 y.o. male who presented 3/8/2024 with pain in the right groin.  He says it began last Thursday initially after he had his physical therapy for his back which she had surgery in November.  The patient now says that the abscess has slowly developed and the pain has increased.  The abscess was incised and drained in the emergency room.  Patient will need blood cultures and wound cultures.  He will need IV antibiotics with Unasyn vancomycin.  Patient has a history of being diabetic.  Patient will need pain management fluid resuscitation as well.    I discussed the plan of care with patient, bedside RN, and emergency room physician Dr. Keven Mackey .    Review of Systems  Review of Systems   Constitutional: Negative.  Negative for chills, diaphoresis and fever.   HENT: Negative.     Eyes: Negative.  Negative for double vision.   Respiratory: Negative.  Negative for cough, hemoptysis and wheezing.    Cardiovascular: Negative.  Negative for chest pain, palpitations and leg swelling.   Gastrointestinal: Negative.  Negative for abdominal pain, blood in stool, constipation, diarrhea, heartburn, nausea and vomiting.   Genitourinary: Negative.  Negative for frequency, hematuria and urgency.   Musculoskeletal:  Positive for back pain and myalgias (In the groin). Negative for joint pain.   Skin: Negative.  Negative for itching and rash.   Neurological: Negative.  Negative for dizziness, focal weakness, seizures, loss of consciousness and headaches.   Endo/Heme/Allergies: Negative.  Does not bruise/bleed easily.   Psychiatric/Behavioral: Negative.  Negative for suicidal ideas. The  patient is not nervous/anxious.    All other systems reviewed and are negative.      Past Medical History   has a past medical history of Diabetes (HCC), DVT (deep venous thrombosis) ([facility]), Hyponatremia (9/22/2021), and Nausea (9/23/2021).    Surgical History   has a past surgical history that includes cervical disk and fusion anterior (2004); fusion, spine, lumbar, plif (2022); and fusion (Bilateral).     Family History  family history includes Breast Cancer in his mother.   Family history reviewed with patient. There is no family history that is pertinent to the chief complaint.     Social History   reports that he has quit smoking. His smoking use included cigarettes. He has a 70 pack-year smoking history. He has never used smokeless tobacco. He reports that he does not currently use alcohol. He reports current drug use. Drugs: Marijuana and Inhaled.    Allergies  No Known Allergies    Medications  Prior to Admission Medications   Prescriptions Last Dose Informant Patient Reported? Taking?   Alcohol Swabs   No No   Sig: Wipe site with prep pad prior to injection.   Blood Glucose Meter Kit   No No   Sig: Check blood sugar once in the morning and once in the evening.   Blood Glucose Test Strips   No No   Sig: Use one strip once in the morning and one in the evening.   Insulin Pen Needle 32 G x 4 mm   No No   Sig: Use one pen needle in pen device to inject insulin once daily.   Lancets   No No   Sig: Lancets order: Lancets for covered glucometer. Sig: use in morning and once in the evening   TRUE METRIX BLOOD GLUCOSE TEST strip   No No   Sig: USE ONE STRIP ONCE IN THE MORNING AND ONE IN THE EVENING.   XARELTO 20 MG Tab tablet   No No   Sig: TAKE 1 TABLET BY MOUTH EVERY DAY WITH DINNER   atorvastatin (LIPITOR) 20 MG Tab   No No   Sig: TAKE 1 TABLET BY MOUTH EVERY DAY IN THE EVENING   Patient not taking: Reported on 12/1/2023   insulin glargine 100 UNIT/ML SC SOPN injection   No No   Sig: Inject 10 Units under the  skin every evening.   lisinopril (PRINIVIL) 10 MG Tab   No No   Sig: Take 0.5 Tablets by mouth every day.   magnesium gluconate (MAG-G) 500 MG tablet   Yes No   Sig: Take 500 mg by mouth 3 times a day.   tamsulosin (FLOMAX) 0.4 MG capsule   No No   Sig: Take 1 Capsule by mouth 1/2 hour after breakfast.      Facility-Administered Medications: None       Physical Exam  Temp:  [36.7 °C (98 °F)-36.9 °C (98.4 °F)] 36.9 °C (98.4 °F)  Pulse:  [83-91] 83  Resp:  [18] 18  BP: (143)/(87-90) 143/90  SpO2:  [98 %] 98 %  Blood Pressure: (!) 143/90   Temperature: 36.9 °C (98.4 °F)   Pulse: 83   Respiration: 18   Pulse Oximetry: 98 %       Physical Exam  Vitals and nursing note reviewed. Exam conducted with a chaperone present.   Constitutional:       General: He is awake.      Appearance: He is well-developed, well-groomed and normal weight. He is ill-appearing. He is not diaphoretic.   HENT:      Head: Normocephalic and atraumatic.      Jaw: There is normal jaw occlusion. No trismus.      Salivary Glands: Right salivary gland is not tender. Left salivary gland is not tender.      Right Ear: External ear normal.      Left Ear: External ear normal.      Nose: Nose normal.      Mouth/Throat:      Mouth: Mucous membranes are moist.      Pharynx: Oropharynx is clear.   Eyes:      General: Lids are normal. Vision grossly intact.         Right eye: No discharge.         Left eye: No discharge.      Extraocular Movements: Extraocular movements intact.      Conjunctiva/sclera: Conjunctivae normal.      Right eye: Right conjunctiva is not injected. No exudate.     Left eye: Left conjunctiva is not injected. No exudate.     Pupils: Pupils are equal, round, and reactive to light.   Neck:      Thyroid: No thyroid mass.      Vascular: No carotid bruit, hepatojugular reflux or JVD.      Trachea: No abnormal tracheal secretions or tracheal deviation.   Cardiovascular:      Rate and Rhythm: Regular rhythm. Tachycardia present. Occasional  Extrasystoles are present.     Pulses: Normal pulses.      Heart sounds: Normal heart sounds.   Pulmonary:      Effort: Pulmonary effort is normal.      Breath sounds: Normal breath sounds.   Abdominal:      General: Abdomen is flat. Bowel sounds are normal.      Palpations: Abdomen is soft.      Tenderness: There is no abdominal tenderness. There is no right CVA tenderness or left CVA tenderness.      Hernia: No hernia is present.   Musculoskeletal:         General: Normal range of motion.      Cervical back: Full passive range of motion without pain.      Right lower leg: No edema.      Left lower leg: No edema.   Lymphadenopathy:      Head:      Right side of head: No submental adenopathy.      Left side of head: No submental adenopathy.      Cervical: No cervical adenopathy.      Right cervical: No superficial cervical adenopathy.     Left cervical: No superficial cervical adenopathy.      Upper Body:      Right upper body: No supraclavicular adenopathy.      Left upper body: No supraclavicular adenopathy.   Skin:     General: Skin is warm and moist.      Capillary Refill: Capillary refill takes 2 to 3 seconds.      Coloration: Skin is not cyanotic.      Findings: Erythema and lesion (In the right groin) present. No abrasion.   Neurological:      General: No focal deficit present.      Mental Status: He is alert and oriented to person, place, and time. Mental status is at baseline.   Psychiatric:         Attention and Perception: Perception normal. He is inattentive.         Mood and Affect: Mood normal. Affect is angry.         Speech: Speech is rapid and pressured and tangential.         Behavior: Behavior is uncooperative, agitated and aggressive.         Thought Content: Thought content is paranoid.         Cognition and Memory: Cognition and memory normal.         Judgment: Judgment is impulsive.         Laboratory:  Recent Labs     03/08/24  1445   WBC 7.0   RBC 5.01   HEMOGLOBIN 16.8   HEMATOCRIT 46.6  "  MCV 93.0   MCH 33.5*   MCHC 36.1   RDW 50.4*   PLATELETCT 134*   MPV 10.5     Recent Labs     03/08/24  1445   SODIUM 134*   POTASSIUM 4.6   CHLORIDE 101   CO2 24   GLUCOSE 129*   BUN 14   CREATININE 0.95   CALCIUM 8.9     Recent Labs     03/08/24  1445   ALTSGPT 54*   ASTSGOT 99*   ALKPHOSPHAT 149*   TBILIRUBIN 0.4   GLUCOSE 129*         No results for input(s): \"NTPROBNP\" in the last 72 hours.      No results for input(s): \"TROPONINT\" in the last 72 hours.    Imaging:  CT-PELVIS WITH   Final Result         1.  Abscess in the proximal medial right thigh/inferior gluteal region measuring about 2.4 x 1.7 x 5.4 cm with associated cellulitis.   2.  No evidence of tract or association with the perianal region.   3.  Colonic diverticulosis without acute diverticulitis.          X-Ray:  I have personally reviewed the images and compared with prior images.  EKG:  I have personally reviewed the images and compared with prior images.    Assessment/Plan:  Justification for Admission Status  I anticipate this patient will require at least two midnights for appropriate medical management, necessitating inpatient admission because patient has an abscess in the right groin.  Patient used to be diabetic he says he got his hemoglobin A1c down to under 4 with diet change and exercise.  He will require at least 48 hours of inpatient IV antibiotics to get rid of this abscess.  He may need additional surgery as well    Patient will need a Med/Surg bed on MEDICAL service .  The need is secondary to right groin abscess.    * Groin abscess- (present on admission)  Assessment & Plan  Patient comes in with a 8-day complaint of a groin lesion which turns out to be an abscess.  In the emergency room abscess was incised and drained.  2 drains were left in place  Given that he is diabetic this will be a festering infection will need blood and wound cultures  Initiate Unasyn and vancomycin        Elevated liver enzymes- (present on " admission)  Assessment & Plan  Secondary to hepatitis C most likely the patient has close elevated liver enzymes.  He denies alcohol use  Obtain a current hepatitis screen screen.    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  Give fluid resuscitation monitor sodium levels    Benign prostatic hyperplasia- (present on admission)  Assessment & Plan  Chronic benign prostatic hypertrophy he is supposed to be on Flomax not sure if he is taking it    History of tobacco abuse- (present on admission)  Assessment & Plan  History of tobacco use denies current use.  He says he does use marijuana on a daily basis several times  Counseled on cessation    Thrombocytopathia (HCC)- (present on admission)  Assessment & Plan  Mild thrombocytopenia at 134  Continue to monitor dyspnea is most likely medication induced        VTE prophylaxis: SCDs/TEDs

## 2024-03-09 NOTE — ASSESSMENT & PLAN NOTE
Patient comes in with a 8-day complaint of a groin lesion which turns out to be an abscess.  In the emergency room abscess was incised and drained.  2 drains were left in place  Given that he is diabetic this will be a festering infection will need blood and wound cultures  Initiate Unasyn and vancomycin

## 2024-03-09 NOTE — DISCHARGE INSTRUCTIONS
Antibiotics as prescribed.  Return to the emergency department in 48 hours for packing removal.  There is a packing and wound is to be removed and that she is to be rechecked.  Return sooner for increasing pain, swelling, redness, fever or other concerns.    You are leaving as medical advice.  This infection should be hospitalized for IV antibiotics.  Return if you would like to complete your workup be hospitalized.  This could get significantly worse resulting in loss of tissue, or serious disability or death.  Follow-up with your doctor.

## 2024-03-10 ENCOUNTER — HOSPITAL ENCOUNTER (EMERGENCY)
Facility: MEDICAL CENTER | Age: 64
End: 2024-03-10
Attending: EMERGENCY MEDICINE
Payer: COMMERCIAL

## 2024-03-10 VITALS
SYSTOLIC BLOOD PRESSURE: 111 MMHG | HEART RATE: 74 BPM | DIASTOLIC BLOOD PRESSURE: 64 MMHG | HEIGHT: 76 IN | WEIGHT: 231.26 LBS | OXYGEN SATURATION: 97 % | RESPIRATION RATE: 16 BRPM | BODY MASS INDEX: 28.16 KG/M2 | TEMPERATURE: 97.4 F

## 2024-03-10 DIAGNOSIS — Z51.89 ENCOUNTER FOR WOUND RE-CHECK: ICD-10-CM

## 2024-03-10 PROCEDURE — 99282 EMERGENCY DEPT VISIT SF MDM: CPT

## 2024-03-10 ASSESSMENT — FIBROSIS 4 INDEX: FIB4 SCORE: 6.23

## 2024-03-10 NOTE — ED TRIAGE NOTES
"Chief Complaint   Patient presents with    Wound Check     Reports abscess to R groin/buttock, seen here and I&D done recently. Pt. States he feels that this is improving but was asked to return for wound check \"because they wanted me to stay and I couldn't\". Denies fevers/chills.     Physical Exam  Pulmonary:      Effort: Pulmonary effort is normal.   Skin:     General: Skin is warm and dry.   Neurological:      Mental Status: He is alert.         "

## 2024-03-10 NOTE — ED PROVIDER NOTES
"ED Provider Note    CHIEF COMPLAINT  Chief Complaint   Patient presents with    Wound Check     Reports abscess to R groin/buttock, seen here and I&D done recently. Pt. States he feels that this is improving but was asked to return for wound check \"because they wanted me to stay and I couldn't\". Denies fevers/chills.       EXTERNAL RECORDS REVIEWED  Outpatient Notes I reviewed   the visit from March 8, 2024, 2 days ago the patient had an I&D of his abscess    HPI/ROS  LIMITATION TO HISTORY   Select: : None  OUTSIDE HISTORIAN(S):  None    Ajit Gregory is a 63 y.o. male who presents for check of his post I&D abscess.  The patient reports that the packing came out when he removed the bandage.  He denies fever, he denies pain, he denies any systemic symptoms.  He reports that he \"feels 100% better\".  The patient was to be admitted on his last visit but signed out AGAINST MEDICAL ADVICE.    PAST MEDICAL HISTORY   has a past medical history of Diabetes ([facility]), DVT (deep venous thrombosis) ([facility]), Hyponatremia (9/22/2021), and Nausea (9/23/2021).    SURGICAL HISTORY   has a past surgical history that includes cervical disk and fusion anterior (2004); fusion, spine, lumbar, plif (2022); and fusion (Bilateral).    FAMILY HISTORY  Family History   Problem Relation Age of Onset    Breast Cancer Mother        SOCIAL HISTORY  Social History     Tobacco Use    Smoking status: Former     Current packs/day: 2.00     Average packs/day: 2.0 packs/day for 35.0 years (70.0 ttl pk-yrs)     Types: Cigarettes    Smokeless tobacco: Never   Vaping Use    Vaping Use: Never used   Substance and Sexual Activity    Alcohol use: Not Currently    Drug use: Yes     Types: Marijuana, Inhaled     Comment: daily    Sexual activity: Not on file       CURRENT MEDICATIONS  Home Medications       Reviewed by Charlene Taylor R.N. (Registered Nurse) on 03/10/24 at 1200  Med List Status: Not Addressed     Medication Last Dose Status   Alcohol Swabs " " Active   amoxicillin-clavulanate (AUGMENTIN) 875-125 MG Tab  Active   atorvastatin (LIPITOR) 20 MG Tab  Active   Blood Glucose Meter Kit  Active   Blood Glucose Test Strips  Active   insulin glargine 100 UNIT/ML SC SOPN injection  Active   Insulin Pen Needle 32 G x 4 mm  Active   Lancets  Active   lisinopril (PRINIVIL) 10 MG Tab  Active   magnesium gluconate (MAG-G) 500 MG tablet  Active   sulfamethoxazole-trimethoprim (BACTRIM DS) 800-160 MG tablet  Active   tamsulosin (FLOMAX) 0.4 MG capsule  Active   TRUE METRIX BLOOD GLUCOSE TEST strip  Active   XARELTO 20 MG Tab tablet  Active                    ALLERGIES  No Known Allergies    PHYSICAL EXAM  VITAL SIGNS: /81   Pulse 88   Temp 36.3 °C (97.4 °F) (Temporal)   Resp 20   Ht 1.93 m (6' 4\")   Wt 105 kg (231 lb 4.2 oz)   SpO2 95%   BMI 28.15 kg/m²      Buttocks: There is no erythema, no drainage.  The area does not look infected at this time.        COURSE & MEDICAL DECISION MAKING    ED Observation Status? No; Patient does not meet criteria for ED Observation.     INITIAL ASSESSMENT, COURSE AND PLAN  Care Narrative:     The patient presents for wound recheck, his wound looks very good.  He will return for any fever vomiting worsening symptoms.            ADDITIONAL PROBLEM LIST  History of diabetes  DISPOSITION AND DISCUSSIONS  I have discussed management of the patient with the following physicians and BOZENA's: None    Discussion of management with other QHP or appropriate source(s): None     Escalation of care considered, and ultimately not performed:blood analysis and diagnostic imaging    Barriers to care at this time, including but not limited to: The patient signed out AGAINST MEDICAL ADVICE on his last visit    Decision tools and prescription drugs considered including, but not limited to:  Patient will continue his p.o. antibiotics.     The patient will return for new or worsening symptoms and is stable at the time of discharge.    The patient is " referred to a primary physician for blood pressure management, diabetic screening, and for all other preventative health concerns.        DISPOSITION:  Patient will be discharged home in stable condition.    FOLLOW UP:  [facility], Emergency Dept  [address]  [place] [id number] [phone]    If symptoms worsen      OUTPATIENT MEDICATIONS:  Discharge Medication List as of 3/10/2024  1:29 PM            FINAL DIAGNOSIS  1. Encounter for wound re-check           Electronically signed by: Bryan Trejo M.D., 3/10/2024 1:20 PM

## 2024-03-27 ENCOUNTER — TELEPHONE (OUTPATIENT)
Dept: INTERNAL MEDICINE | Facility: OTHER | Age: 64
End: 2024-03-27
Payer: COMMERCIAL

## 2024-03-27 DIAGNOSIS — Z86.718 HISTORY OF DVT (DEEP VEIN THROMBOSIS): ICD-10-CM

## 2024-03-27 DIAGNOSIS — K55.069 SUPERIOR MESENTERIC VEIN THROMBOSIS (HCC): ICD-10-CM

## 2025-03-11 ENCOUNTER — APPOINTMENT (OUTPATIENT)
Dept: RADIOLOGY | Facility: MEDICAL CENTER | Age: 65
DRG: 603 | End: 2025-03-11
Attending: EMERGENCY MEDICINE
Payer: COMMERCIAL

## 2025-03-11 ENCOUNTER — HOSPITAL ENCOUNTER (INPATIENT)
Facility: MEDICAL CENTER | Age: 65
LOS: 1 days | DRG: 603 | End: 2025-03-12
Attending: EMERGENCY MEDICINE | Admitting: STUDENT IN AN ORGANIZED HEALTH CARE EDUCATION/TRAINING PROGRAM
Payer: COMMERCIAL

## 2025-03-11 DIAGNOSIS — L02.415 ABSCESS OF RIGHT THIGH: ICD-10-CM

## 2025-03-11 DIAGNOSIS — L03.115 CELLULITIS OF RIGHT THIGH: ICD-10-CM

## 2025-03-11 PROBLEM — L02.419 CELLULITIS AND ABSCESS OF LEG: Status: ACTIVE | Noted: 2025-03-11

## 2025-03-11 PROBLEM — L03.119 CELLULITIS AND ABSCESS OF LEG: Status: ACTIVE | Noted: 2025-03-11

## 2025-03-11 LAB
ALBUMIN SERPL BCP-MCNC: 3.9 G/DL (ref 3.2–4.9)
ALBUMIN/GLOB SERPL: 1.4 G/DL
ALP SERPL-CCNC: 126 U/L (ref 30–99)
ALT SERPL-CCNC: 32 U/L (ref 2–50)
ANION GAP SERPL CALC-SCNC: 10 MMOL/L (ref 7–16)
AST SERPL-CCNC: 25 U/L (ref 12–45)
BASOPHILS # BLD AUTO: 0.3 % (ref 0–1.8)
BASOPHILS # BLD: 0.02 K/UL (ref 0–0.12)
BILIRUB SERPL-MCNC: 0.9 MG/DL (ref 0.1–1.5)
BUN SERPL-MCNC: 11 MG/DL (ref 8–22)
CALCIUM ALBUM COR SERPL-MCNC: 9.2 MG/DL (ref 8.5–10.5)
CALCIUM SERPL-MCNC: 9.1 MG/DL (ref 8.5–10.5)
CHLORIDE SERPL-SCNC: 104 MMOL/L (ref 96–112)
CO2 SERPL-SCNC: 22 MMOL/L (ref 20–33)
CREAT SERPL-MCNC: 0.98 MG/DL (ref 0.5–1.4)
EOSINOPHIL # BLD AUTO: 0.06 K/UL (ref 0–0.51)
EOSINOPHIL NFR BLD: 0.9 % (ref 0–6.9)
ERYTHROCYTE [DISTWIDTH] IN BLOOD BY AUTOMATED COUNT: 46.8 FL (ref 35.9–50)
GFR SERPLBLD CREATININE-BSD FMLA CKD-EPI: 87 ML/MIN/1.73 M 2
GLOBULIN SER CALC-MCNC: 2.8 G/DL (ref 1.9–3.5)
GLUCOSE BLD STRIP.AUTO-MCNC: 125 MG/DL (ref 65–99)
GLUCOSE SERPL-MCNC: 98 MG/DL (ref 65–99)
GRAM STN SPEC: NORMAL
HCT VFR BLD AUTO: 44.7 % (ref 42–52)
HGB BLD-MCNC: 15.5 G/DL (ref 14–18)
IMM GRANULOCYTES # BLD AUTO: 0.02 K/UL (ref 0–0.11)
IMM GRANULOCYTES NFR BLD AUTO: 0.3 % (ref 0–0.9)
LACTATE SERPL-SCNC: 1.3 MMOL/L (ref 0.5–2)
LYMPHOCYTES # BLD AUTO: 0.87 K/UL (ref 1–4.8)
LYMPHOCYTES NFR BLD: 13 % (ref 22–41)
MAGNESIUM SERPL-MCNC: 2 MG/DL (ref 1.5–2.5)
MCH RBC QN AUTO: 31.3 PG (ref 27–33)
MCHC RBC AUTO-ENTMCNC: 34.7 G/DL (ref 32.3–36.5)
MCV RBC AUTO: 90.3 FL (ref 81.4–97.8)
MONOCYTES # BLD AUTO: 0.5 K/UL (ref 0–0.85)
MONOCYTES NFR BLD AUTO: 7.5 % (ref 0–13.4)
NEUTROPHILS # BLD AUTO: 5.21 K/UL (ref 1.82–7.42)
NEUTROPHILS NFR BLD: 78 % (ref 44–72)
NRBC # BLD AUTO: 0 K/UL
NRBC BLD-RTO: 0 /100 WBC (ref 0–0.2)
PHOSPHATE SERPL-MCNC: 2.6 MG/DL (ref 2.5–4.5)
PLATELET # BLD AUTO: 117 K/UL (ref 164–446)
PMV BLD AUTO: 10.3 FL (ref 9–12.9)
POTASSIUM SERPL-SCNC: 4.2 MMOL/L (ref 3.6–5.5)
PROT SERPL-MCNC: 6.7 G/DL (ref 6–8.2)
RBC # BLD AUTO: 4.95 M/UL (ref 4.7–6.1)
SCCMEC + MECA PNL NOSE NAA+PROBE: NEGATIVE
SIGNIFICANT IND 70042: NORMAL
SITE SITE: NORMAL
SODIUM SERPL-SCNC: 136 MMOL/L (ref 135–145)
SOURCE SOURCE: NORMAL
WBC # BLD AUTO: 6.7 K/UL (ref 4.8–10.8)

## 2025-03-11 PROCEDURE — A9270 NON-COVERED ITEM OR SERVICE: HCPCS | Performed by: STUDENT IN AN ORGANIZED HEALTH CARE EDUCATION/TRAINING PROGRAM

## 2025-03-11 PROCEDURE — 87076 CULTURE ANAEROBE IDENT EACH: CPT

## 2025-03-11 PROCEDURE — 700111 HCHG RX REV CODE 636 W/ 250 OVERRIDE (IP): Mod: UD | Performed by: EMERGENCY MEDICINE

## 2025-03-11 PROCEDURE — 87070 CULTURE OTHR SPECIMN AEROBIC: CPT

## 2025-03-11 PROCEDURE — 96365 THER/PROPH/DIAG IV INF INIT: CPT

## 2025-03-11 PROCEDURE — 700105 HCHG RX REV CODE 258: Mod: UD | Performed by: EMERGENCY MEDICINE

## 2025-03-11 PROCEDURE — 700101 HCHG RX REV CODE 250: Mod: UD | Performed by: EMERGENCY MEDICINE

## 2025-03-11 PROCEDURE — 36415 COLL VENOUS BLD VENIPUNCTURE: CPT

## 2025-03-11 PROCEDURE — 87641 MR-STAPH DNA AMP PROBE: CPT

## 2025-03-11 PROCEDURE — 99223 1ST HOSP IP/OBS HIGH 75: CPT | Performed by: STUDENT IN AN ORGANIZED HEALTH CARE EDUCATION/TRAINING PROGRAM

## 2025-03-11 PROCEDURE — 96375 TX/PRO/DX INJ NEW DRUG ADDON: CPT

## 2025-03-11 PROCEDURE — 87205 SMEAR GRAM STAIN: CPT

## 2025-03-11 PROCEDURE — 80053 COMPREHEN METABOLIC PANEL: CPT

## 2025-03-11 PROCEDURE — 84100 ASSAY OF PHOSPHORUS: CPT

## 2025-03-11 PROCEDURE — 0J9L00Z DRAINAGE OF RIGHT UPPER LEG SUBCUTANEOUS TISSUE AND FASCIA WITH DRAINAGE DEVICE, OPEN APPROACH: ICD-10-PCS | Performed by: EMERGENCY MEDICINE

## 2025-03-11 PROCEDURE — 90471 IMMUNIZATION ADMIN: CPT

## 2025-03-11 PROCEDURE — 3E02340 INTRODUCTION OF INFLUENZA VACCINE INTO MUSCLE, PERCUTANEOUS APPROACH: ICD-10-PCS | Performed by: STUDENT IN AN ORGANIZED HEALTH CARE EDUCATION/TRAINING PROGRAM

## 2025-03-11 PROCEDURE — 96367 TX/PROPH/DG ADDL SEQ IV INF: CPT

## 2025-03-11 PROCEDURE — 90656 IIV3 VACC NO PRSV 0.5 ML IM: CPT | Performed by: STUDENT IN AN ORGANIZED HEALTH CARE EDUCATION/TRAINING PROGRAM

## 2025-03-11 PROCEDURE — 700111 HCHG RX REV CODE 636 W/ 250 OVERRIDE (IP): Performed by: STUDENT IN AN ORGANIZED HEALTH CARE EDUCATION/TRAINING PROGRAM

## 2025-03-11 PROCEDURE — 83605 ASSAY OF LACTIC ACID: CPT

## 2025-03-11 PROCEDURE — 87077 CULTURE AEROBIC IDENTIFY: CPT

## 2025-03-11 PROCEDURE — 303977 HCHG I & D

## 2025-03-11 PROCEDURE — 700102 HCHG RX REV CODE 250 W/ 637 OVERRIDE(OP): Performed by: STUDENT IN AN ORGANIZED HEALTH CARE EDUCATION/TRAINING PROGRAM

## 2025-03-11 PROCEDURE — 770006 HCHG ROOM/CARE - MED/SURG/GYN SEMI*

## 2025-03-11 PROCEDURE — 87040 BLOOD CULTURE FOR BACTERIA: CPT

## 2025-03-11 PROCEDURE — 72193 CT PELVIS W/DYE: CPT

## 2025-03-11 PROCEDURE — 83735 ASSAY OF MAGNESIUM: CPT

## 2025-03-11 PROCEDURE — 85025 COMPLETE CBC W/AUTO DIFF WBC: CPT

## 2025-03-11 PROCEDURE — 82962 GLUCOSE BLOOD TEST: CPT

## 2025-03-11 PROCEDURE — 99285 EMERGENCY DEPT VISIT HI MDM: CPT

## 2025-03-11 PROCEDURE — 700117 HCHG RX CONTRAST REV CODE 255: Mod: UD | Performed by: EMERGENCY MEDICINE

## 2025-03-11 RX ORDER — METRONIDAZOLE 500 MG/100ML
500 INJECTION, SOLUTION INTRAVENOUS ONCE
Status: COMPLETED | OUTPATIENT
Start: 2025-03-11 | End: 2025-03-11

## 2025-03-11 RX ORDER — ATORVASTATIN CALCIUM 20 MG/1
20 TABLET, FILM COATED ORAL EVERY EVENING
Status: DISCONTINUED | OUTPATIENT
Start: 2025-03-11 | End: 2025-03-11

## 2025-03-11 RX ORDER — LORAZEPAM 2 MG/ML
1 INJECTION INTRAMUSCULAR ONCE
Status: COMPLETED | OUTPATIENT
Start: 2025-03-12 | End: 2025-03-12

## 2025-03-11 RX ORDER — ACETAMINOPHEN 325 MG/1
650 TABLET ORAL EVERY 6 HOURS PRN
Status: DISCONTINUED | OUTPATIENT
Start: 2025-03-11 | End: 2025-03-12 | Stop reason: HOSPADM

## 2025-03-11 RX ORDER — PROMETHAZINE HYDROCHLORIDE 25 MG/1
12.5-25 TABLET ORAL EVERY 4 HOURS PRN
Status: DISCONTINUED | OUTPATIENT
Start: 2025-03-11 | End: 2025-03-12 | Stop reason: HOSPADM

## 2025-03-11 RX ORDER — INSULIN LISPRO 100 [IU]/ML
1-6 INJECTION, SOLUTION INTRAVENOUS; SUBCUTANEOUS
Status: DISCONTINUED | OUTPATIENT
Start: 2025-03-11 | End: 2025-03-12 | Stop reason: HOSPADM

## 2025-03-11 RX ORDER — TAMSULOSIN HYDROCHLORIDE 0.4 MG/1
0.4 CAPSULE ORAL
Status: DISCONTINUED | OUTPATIENT
Start: 2025-03-12 | End: 2025-03-12 | Stop reason: HOSPADM

## 2025-03-11 RX ORDER — HYDROCODONE BITARTRATE AND ACETAMINOPHEN 5; 325 MG/1; MG/1
1 TABLET ORAL EVERY 6 HOURS PRN
Refills: 0 | Status: DISCONTINUED | OUTPATIENT
Start: 2025-03-11 | End: 2025-03-12 | Stop reason: HOSPADM

## 2025-03-11 RX ORDER — CEFTRIAXONE 2 G/1
2000 INJECTION, POWDER, FOR SOLUTION INTRAMUSCULAR; INTRAVENOUS ONCE
Status: COMPLETED | OUTPATIENT
Start: 2025-03-11 | End: 2025-03-11

## 2025-03-11 RX ORDER — ONDANSETRON 4 MG/1
4 TABLET, ORALLY DISINTEGRATING ORAL EVERY 4 HOURS PRN
Status: DISCONTINUED | OUTPATIENT
Start: 2025-03-11 | End: 2025-03-12 | Stop reason: HOSPADM

## 2025-03-11 RX ORDER — LABETALOL HYDROCHLORIDE 5 MG/ML
10 INJECTION, SOLUTION INTRAVENOUS EVERY 4 HOURS PRN
Status: DISCONTINUED | OUTPATIENT
Start: 2025-03-11 | End: 2025-03-12 | Stop reason: HOSPADM

## 2025-03-11 RX ORDER — ONDANSETRON 2 MG/ML
4 INJECTION INTRAMUSCULAR; INTRAVENOUS EVERY 4 HOURS PRN
Status: DISCONTINUED | OUTPATIENT
Start: 2025-03-11 | End: 2025-03-12 | Stop reason: HOSPADM

## 2025-03-11 RX ORDER — LIDOCAINE HYDROCHLORIDE AND EPINEPHRINE BITARTRATE 20; .01 MG/ML; MG/ML
10 INJECTION, SOLUTION SUBCUTANEOUS ONCE
Status: COMPLETED | OUTPATIENT
Start: 2025-03-11 | End: 2025-03-11

## 2025-03-11 RX ORDER — ONDANSETRON 2 MG/ML
4 INJECTION INTRAMUSCULAR; INTRAVENOUS ONCE
Status: COMPLETED | OUTPATIENT
Start: 2025-03-11 | End: 2025-03-11

## 2025-03-11 RX ORDER — PROMETHAZINE HYDROCHLORIDE 12.5 MG/1
12.5-25 SUPPOSITORY RECTAL EVERY 4 HOURS PRN
Status: DISCONTINUED | OUTPATIENT
Start: 2025-03-11 | End: 2025-03-12 | Stop reason: HOSPADM

## 2025-03-11 RX ORDER — MORPHINE SULFATE 4 MG/ML
4 INJECTION INTRAVENOUS ONCE
Status: COMPLETED | OUTPATIENT
Start: 2025-03-11 | End: 2025-03-11

## 2025-03-11 RX ORDER — AMOXICILLIN 250 MG
2 CAPSULE ORAL EVERY EVENING
Status: DISCONTINUED | OUTPATIENT
Start: 2025-03-11 | End: 2025-03-12 | Stop reason: HOSPADM

## 2025-03-11 RX ORDER — DEXTROSE MONOHYDRATE 25 G/50ML
25 INJECTION, SOLUTION INTRAVENOUS
Status: DISCONTINUED | OUTPATIENT
Start: 2025-03-11 | End: 2025-03-12 | Stop reason: HOSPADM

## 2025-03-11 RX ORDER — LISINOPRIL 5 MG/1
5 TABLET ORAL DAILY
Status: DISCONTINUED | OUTPATIENT
Start: 2025-03-11 | End: 2025-03-12 | Stop reason: HOSPADM

## 2025-03-11 RX ORDER — PROCHLORPERAZINE EDISYLATE 5 MG/ML
5-10 INJECTION INTRAMUSCULAR; INTRAVENOUS EVERY 4 HOURS PRN
Status: DISCONTINUED | OUTPATIENT
Start: 2025-03-11 | End: 2025-03-12 | Stop reason: HOSPADM

## 2025-03-11 RX ORDER — METRONIDAZOLE 500 MG/1
500 TABLET ORAL EVERY 12 HOURS
Status: DISCONTINUED | OUTPATIENT
Start: 2025-03-12 | End: 2025-03-12 | Stop reason: HOSPADM

## 2025-03-11 RX ORDER — POLYETHYLENE GLYCOL 3350 17 G/17G
1 POWDER, FOR SOLUTION ORAL
Status: DISCONTINUED | OUTPATIENT
Start: 2025-03-11 | End: 2025-03-12 | Stop reason: HOSPADM

## 2025-03-11 RX ADMIN — IOHEXOL 85 ML: 350 INJECTION, SOLUTION INTRAVENOUS at 17:00

## 2025-03-11 RX ADMIN — ONDANSETRON 4 MG: 2 INJECTION INTRAMUSCULAR; INTRAVENOUS at 15:07

## 2025-03-11 RX ADMIN — LIDOCAINE HYDROCHLORIDE AND EPINEPHRINE 10 ML: 10; 20 INJECTION, SOLUTION INFILTRATION; PERINEURAL at 17:30

## 2025-03-11 RX ADMIN — MORPHINE SULFATE 4 MG: 4 INJECTION, SOLUTION INTRAMUSCULAR; INTRAVENOUS at 15:07

## 2025-03-11 RX ADMIN — HYDROCODONE BITARTRATE AND ACETAMINOPHEN 1 TABLET: 5; 325 TABLET ORAL at 21:18

## 2025-03-11 RX ADMIN — METRONIDAZOLE 500 MG: 5 INJECTION, SOLUTION INTRAVENOUS at 15:18

## 2025-03-11 RX ADMIN — CEFTRIAXONE SODIUM 2000 MG: 2 INJECTION, POWDER, FOR SOLUTION INTRAMUSCULAR; INTRAVENOUS at 15:18

## 2025-03-11 RX ADMIN — INFLUENZA A VIRUS A/VICTORIA/4897/2022 IVR-238 (H1N1) ANTIGEN (FORMALDEHYDE INACTIVATED), INFLUENZA A VIRUS A/CALIFORNIA/122/2022 SAN-022 (H3N2) ANTIGEN (FORMALDEHYDE INACTIVATED), AND INFLUENZA B VIRUS B/MICHIGAN/01/2021 ANTIGEN (FORMALDEHYDE INACTIVATED) 0.5 ML: 15; 15; 15 INJECTION, SUSPENSION INTRAMUSCULAR at 22:43

## 2025-03-11 RX ADMIN — SODIUM CHLORIDE 2500 MG: 9 INJECTION, SOLUTION INTRAVENOUS at 17:34

## 2025-03-11 RX ADMIN — SENNOSIDES AND DOCUSATE SODIUM 2 TABLET: 50; 8.6 TABLET ORAL at 21:19

## 2025-03-11 SDOH — ECONOMIC STABILITY: TRANSPORTATION INSECURITY
IN THE PAST 12 MONTHS, HAS THE LACK OF TRANSPORTATION KEPT YOU FROM MEDICAL APPOINTMENTS OR FROM GETTING MEDICATIONS?: YES

## 2025-03-11 ASSESSMENT — LIFESTYLE VARIABLES
AVERAGE NUMBER OF DAYS PER WEEK YOU HAVE A DRINK CONTAINING ALCOHOL: 0
EVER FELT BAD OR GUILTY ABOUT YOUR DRINKING: NO
HAVE PEOPLE ANNOYED YOU BY CRITICIZING YOUR DRINKING: NO
HOW MANY TIMES IN THE PAST YEAR HAVE YOU HAD 5 OR MORE DRINKS IN A DAY: 0
TOTAL SCORE: 0
ON A TYPICAL DAY WHEN YOU DRINK ALCOHOL HOW MANY DRINKS DO YOU HAVE: 0
TOTAL SCORE: 0
EVER HAD A DRINK FIRST THING IN THE MORNING TO STEADY YOUR NERVES TO GET RID OF A HANGOVER: NO
CONSUMPTION TOTAL: NEGATIVE
DOES PATIENT WANT TO STOP DRINKING: NO
TOTAL SCORE: 0
ALCOHOL_USE: NO
HAVE YOU EVER FELT YOU SHOULD CUT DOWN ON YOUR DRINKING: NO

## 2025-03-11 ASSESSMENT — COGNITIVE AND FUNCTIONAL STATUS - GENERAL
WALKING IN HOSPITAL ROOM: A LITTLE
CLIMB 3 TO 5 STEPS WITH RAILING: A LITTLE
DAILY ACTIVITIY SCORE: 23
SUGGESTED CMS G CODE MODIFIER DAILY ACTIVITY: CI
MOBILITY SCORE: 22
SUGGESTED CMS G CODE MODIFIER MOBILITY: CJ
DRESSING REGULAR LOWER BODY CLOTHING: A LITTLE

## 2025-03-11 ASSESSMENT — PATIENT HEALTH QUESTIONNAIRE - PHQ9
4. FEELING TIRED OR HAVING LITTLE ENERGY: SEVERAL DAYS
1. LITTLE INTEREST OR PLEASURE IN DOING THINGS: MORE THAN HALF THE DAYS
SUM OF ALL RESPONSES TO PHQ9 QUESTIONS 1 AND 2: 3
7. TROUBLE CONCENTRATING ON THINGS, SUCH AS READING THE NEWSPAPER OR WATCHING TELEVISION: NOT AT ALL
8. MOVING OR SPEAKING SO SLOWLY THAT OTHER PEOPLE COULD HAVE NOTICED. OR THE OPPOSITE, BEING SO FIGETY OR RESTLESS THAT YOU HAVE BEEN MOVING AROUND A LOT MORE THAN USUAL: NOT AT ALL
2. FEELING DOWN, DEPRESSED, IRRITABLE, OR HOPELESS: SEVERAL DAYS
SUM OF ALL RESPONSES TO PHQ QUESTIONS 1-9: 6
3. TROUBLE FALLING OR STAYING ASLEEP OR SLEEPING TOO MUCH: SEVERAL DAYS
5. POOR APPETITE OR OVEREATING: SEVERAL DAYS
9. THOUGHTS THAT YOU WOULD BE BETTER OFF DEAD, OR OF HURTING YOURSELF: NOT AT ALL
6. FEELING BAD ABOUT YOURSELF - OR THAT YOU ARE A FAILURE OR HAVE LET YOURSELF OR YOUR FAMILY DOWN: NOT AL ALL

## 2025-03-11 ASSESSMENT — SOCIAL DETERMINANTS OF HEALTH (SDOH)
WITHIN THE LAST YEAR, HAVE YOU BEEN KICKED, HIT, SLAPPED, OR OTHERWISE PHYSICALLY HURT BY YOUR PARTNER OR EX-PARTNER?: NO
WITHIN THE PAST 12 MONTHS, THE FOOD YOU BOUGHT JUST DIDN'T LAST AND YOU DIDN'T HAVE MONEY TO GET MORE: OFTEN TRUE
WITHIN THE PAST 12 MONTHS, YOU WORRIED THAT YOUR FOOD WOULD RUN OUT BEFORE YOU GOT THE MONEY TO BUY MORE: OFTEN TRUE
WITHIN THE LAST YEAR, HAVE YOU BEEN AFRAID OF YOUR PARTNER OR EX-PARTNER?: NO
WITHIN THE LAST YEAR, HAVE YOU BEEN HUMILIATED OR EMOTIONALLY ABUSED IN OTHER WAYS BY YOUR PARTNER OR EX-PARTNER?: NO
IN THE PAST 12 MONTHS, HAS THE ELECTRIC, GAS, OIL, OR WATER COMPANY THREATENED TO SHUT OFF SERVICE IN YOUR HOME?: NO
WITHIN THE LAST YEAR, HAVE TO BEEN RAPED OR FORCED TO HAVE ANY KIND OF SEXUAL ACTIVITY BY YOUR PARTNER OR EX-PARTNER?: NO

## 2025-03-11 ASSESSMENT — PAIN DESCRIPTION - PAIN TYPE
TYPE: ACUTE PAIN

## 2025-03-11 ASSESSMENT — FIBROSIS 4 INDEX
FIB4 SCORE: 6.33
FIB4 SCORE: 2.38

## 2025-03-11 NOTE — ED PROVIDER NOTES
ED Provider Note    CHIEF COMPLAINT  Chief Complaint   Patient presents with    Abscess     R groin abscess.   Hx of same. drained twice        EXTERNAL RECORDS REVIEWED  Outpatient Notes 3/8/2024 cellulitis, abscess right medial thigh status post incision and drainage in the ED, admitted for IV antibiotics, cellulitis and observation.  But left AGAINST MEDICAL ADVICE.  Sick/29/23 abscess right groin and buttock local incision and drainage in the ED, discharged with antibiotics.    HPI/ROS  LIMITATION TO HISTORY   Select: : None  OUTSIDE HISTORIAN(S):  None    Ajit Gregory is a 64 y.o. male who presents to the emergency department through triage for right medial thigh pain, swelling and believed abscess.  Patient states he has history of the same twice previously requiring I&D.  Once previously requiring hospitalization, none requiring surgery.  Progressive over the last few days, now pain with ambulation, swelling noted as well.  No abdominal pain.  No  dysuria, hematuria or frequency.  No fever.    PAST MEDICAL HISTORY   has a past medical history of Diabetes ([facility]), DVT (deep venous thrombosis) ([facility]), Hyponatremia (9/22/2021), and Nausea (9/23/2021).    SURGICAL HISTORY   has a past surgical history that includes cervical disk and fusion anterior (2004); fusion, spine, lumbar, plif (2022); and fusion (Bilateral).    FAMILY HISTORY  Family History   Problem Relation Age of Onset    Breast Cancer Mother        SOCIAL HISTORY  Social History     Tobacco Use    Smoking status: Former     Current packs/day: 2.00     Average packs/day: 2.0 packs/day for 35.0 years (70.0 ttl pk-yrs)     Types: Cigarettes    Smokeless tobacco: Never   Vaping Use    Vaping status: Never Used   Substance and Sexual Activity    Alcohol use: Not Currently    Drug use: Yes     Types: Marijuana, Inhaled     Comment: daily    Sexual activity: Not on file       CURRENT MEDICATIONS  Home Medications       Reviewed by Ro Tierney,  "ADALGISA (Registered Nurse) on 03/11/25 at 1237  Med List Status: Not Addressed     Medication Last Dose Status   Alcohol Swabs  Active   apixaban (ELIQUIS) 5mg Tab  Active   atorvastatin (LIPITOR) 20 MG Tab  Active   Blood Glucose Meter Kit  Active   Blood Glucose Test Strips  Active   insulin glargine 100 UNIT/ML SC SOPN injection  Active   Insulin Pen Needle 32 G x 4 mm  Active   Lancets  Active   lisinopril (PRINIVIL) 10 MG Tab  Active   magnesium gluconate (MAG-G) 500 MG tablet  Active   tamsulosin (FLOMAX) 0.4 MG capsule  Active   TRUE METRIX BLOOD GLUCOSE TEST strip  Active                  Audit from Redirected Encounters    **Home medications have not yet been reviewed for this encounter**         ALLERGIES  No Known Allergies    PHYSICAL EXAM  VITAL SIGNS: BP (!) 152/72   Pulse 88   Temp 36.7 °C (98 °F) (Temporal)   Resp 16   Ht 1.93 m (6' 4\")   Wt 104 kg (229 lb 0.9 oz)   SpO2 95%   BMI 27.88 kg/m²    Pulse ox interpretation: I interpret this pulse ox as normal.  Constitutional: Alert in no apparent distress.  HENT: Normocephalic, atraumatic. Bilateral external ears normal, Nose normal.   Eyes: Conjunctiva normal.   Neck: Normal range of motion  Cardiovascular: Regular rate and rhythm, no murmurs. Distal pulses intact.    Thorax & Lungs: Normal breath sounds.  No wheezing/rales/ronchi. No increased work of breathing  Abdomen: Soft, non-distended, non-tender to palpation.   : Right medial thigh with warmth, erythema extending toward the perineum.  Induration with large area of fluctuance at the very medial thigh.  No crepitus.  No scrotal swelling or discoloration.  Skin: Warm, Dry, No erythema, No rash.   Musculoskeletal: Good range of motion in all major joints.   Neurologic: Alert and orient x 4.  Speech clear and cohesive.  Psychiatric: Affect normal, Judgment normal, Mood normal.       EKG/LABS  Results for orders placed or performed during the hospital encounter of 03/11/25   Lactic Acid    " Collection Time: 03/11/25  2:56 PM   Result Value Ref Range    Lactic Acid 1.3 0.5 - 2.0 mmol/L   CBC with Differential    Collection Time: 03/11/25  2:56 PM   Result Value Ref Range    WBC 6.7 4.8 - 10.8 K/uL    RBC 4.95 4.70 - 6.10 M/uL    Hemoglobin 15.5 14.0 - 18.0 g/dL    Hematocrit 44.7 42.0 - 52.0 %    MCV 90.3 81.4 - 97.8 fL    MCH 31.3 27.0 - 33.0 pg    MCHC 34.7 32.3 - 36.5 g/dL    RDW 46.8 35.9 - 50.0 fL    Platelet Count 117 (L) 164 - 446 K/uL    MPV 10.3 9.0 - 12.9 fL    Neutrophils-Polys 78.00 (H) 44.00 - 72.00 %    Lymphocytes 13.00 (L) 22.00 - 41.00 %    Monocytes 7.50 0.00 - 13.40 %    Eosinophils 0.90 0.00 - 6.90 %    Basophils 0.30 0.00 - 1.80 %    Immature Granulocytes 0.30 0.00 - 0.90 %    Nucleated RBC 0.00 0.00 - 0.20 /100 WBC    Neutrophils (Absolute) 5.21 1.82 - 7.42 K/uL    Lymphs (Absolute) 0.87 (L) 1.00 - 4.80 K/uL    Monos (Absolute) 0.50 0.00 - 0.85 K/uL    Eos (Absolute) 0.06 0.00 - 0.51 K/uL    Baso (Absolute) 0.02 0.00 - 0.12 K/uL    Immature Granulocytes (abs) 0.02 0.00 - 0.11 K/uL    NRBC (Absolute) 0.00 K/uL   Complete Metabolic Panel    Collection Time: 03/11/25  2:56 PM   Result Value Ref Range    Sodium 136 135 - 145 mmol/L    Potassium 4.2 3.6 - 5.5 mmol/L    Chloride 104 96 - 112 mmol/L    Co2 22 20 - 33 mmol/L    Anion Gap 10.0 7.0 - 16.0    Glucose 98 65 - 99 mg/dL    Bun 11 8 - 22 mg/dL    Creatinine 0.98 0.50 - 1.40 mg/dL    Calcium 9.1 8.5 - 10.5 mg/dL    Correct Calcium 9.2 8.5 - 10.5 mg/dL    AST(SGOT) 25 12 - 45 U/L    ALT(SGPT) 32 2 - 50 U/L    Alkaline Phosphatase 126 (H) 30 - 99 U/L    Total Bilirubin 0.9 0.1 - 1.5 mg/dL    Albumin 3.9 3.2 - 4.9 g/dL    Total Protein 6.7 6.0 - 8.2 g/dL    Globulin 2.8 1.9 - 3.5 g/dL    A-G Ratio 1.4 g/dL   ESTIMATED GFR    Collection Time: 03/11/25  2:56 PM   Result Value Ref Range    GFR (CKD-EPI) 87 >60 mL/min/1.73 m 2     RADIOLOGY/PROCEDURES     Radiologist interpretation:  CT-PELVIS WITH   Final Result      1.  Right inferior  gluteal fold/pudendal rim-enhancing fluid collection measuring 5 x 2 cm in size with surrounding inflammatory change/cellulitis consistent with abscess.      2.  No evidence of intrapelvic fluid collection or inflammatory change.      3.  Sigmoid diverticulosis        PROCEDURE  INCISION AND DRAINAGE PROCEDURE NOTE:  Patient identification was confirmed and consent was obtained.  This procedure was performed by   Site: right thigh  Sterile procedures observed  Needle size: 27  Anesthetic used (type and amt): Lidocaine 2% with epinephrine, 3 cc  Blade size: 11  Drainage: Copious malodorous purulent  Loop drain placed.  Site anesthetized, incision made over site, wound drained and explored loculations, rinsed with copious amounts of normal saline, wound packed with sterile gauze, covered with dry, sterile dressing. Pt tolerated procedure well without complications.       COURSE & MEDICAL DECISION MAKING    ASSESSMENT, COURSE AND PLAN  Care Narrative:   Evaluated bedside.  Left medial thigh cellulitis, abscess noted at the very medial thigh, inferior groin/gluteal fold.  Add labs, CT to evaluate extensive deep tissue involvement.  Add IV antibiotics, history of diabetes although patient states nonmedicated, improved diet and exercise.  After discussion with pharmacy add Rocephin, Flagyl, vancomycin (Unasyn shortage).    4:59 PM leukocytosis despite mild leftward shift, no bandemia.  No anemia.  No electrolyte derangement.  Lactic acid is normal.  Blood cultures pending.  CT as above with right inferior gluteal/pudendal rim-enhancing fluid collection measuring 5 x 2 cm in size with surrounding inflammatory change/cellulitis consistent with abscess.  No evidence of intrapelvic fluid collection or inflammatory change.  Plan bedside I&D, hospitalist admission for IV antibiotics and treatment till improvement noted in cellulitis.    5:31 PM I&D as described above without complication.  Copious malodorous purulent  discharge.  Culture obtained.  Loop drain placed.  Patient tolerated procedure well.    ADDITIONAL PROBLEMS MANAGED  Diabetes    DISPOSITION AND DISCUSSIONS  I have discussed management of the patient with the following physicians and BOZENA's:    5:13 PM Dr. Arango where the patient agreeable consultation.      FINAL DIAGNOSIS  1. Abscess of right thigh    2. Cellulitis of right thigh         Electronically signed by: Linette Cao D.O., 3/11/2025 4:59 PM

## 2025-03-11 NOTE — ED TRIAGE NOTES
"Chief Complaint   Patient presents with    Abscess     R groin abscess.   Hx of same. drained twice      /70   Pulse 91   Temp 36.7 °C (98 °F) (Temporal)   Resp 16   Ht 1.93 m (6' 4\")   Wt 104 kg (229 lb 0.9 oz)   SpO2 95%   BMI 27.88 kg/m²     Pt is alert/oriented and follows commands. Pt speaking in full sentences and responds appropriately to questions. No acute distress noted in triage and respirations are even and unlabored.      Pt placed in lobby and educated on triage process. Pt encouraged to alert staff for any changes in condition.      "

## 2025-03-11 NOTE — ED NOTES
PIV placed, labs drawn and both sets of blood cultures and sent to sample handling. Pt medicated per MAR

## 2025-03-12 VITALS
DIASTOLIC BLOOD PRESSURE: 86 MMHG | BODY MASS INDEX: 27.89 KG/M2 | WEIGHT: 229.06 LBS | RESPIRATION RATE: 18 BRPM | TEMPERATURE: 97.8 F | OXYGEN SATURATION: 95 % | SYSTOLIC BLOOD PRESSURE: 120 MMHG | HEART RATE: 80 BPM | HEIGHT: 76 IN

## 2025-03-12 PROCEDURE — 700111 HCHG RX REV CODE 636 W/ 250 OVERRIDE (IP): Performed by: STUDENT IN AN ORGANIZED HEALTH CARE EDUCATION/TRAINING PROGRAM

## 2025-03-12 RX ADMIN — LORAZEPAM 1 MG: 2 INJECTION, SOLUTION INTRAMUSCULAR; INTRAVENOUS at 00:21

## 2025-03-12 NOTE — H&P
Hospital Medicine History & Physical Note    Date of Service  3/11/2025    Primary Care Physician  Kalyan Taylor D.O.    Consultants  None    Code Status  Prior    Chief Complaint  Chief Complaint   Patient presents with    Abscess     R groin abscess.   Hx of same. drained twice        History of Presenting Illness  Ajit Gregory is a 64 y.o. male diabetes, history of DVT, factor V [place], hypertension, hyperlipidemia who presented 3/11/2025 with groin abscess and cellulitis.    Patient presents to the ED with complaints of right thigh pain, erythema, swelling, concerns of abscess.  Has had 2 prior abscesses similar location in the past.  First noticed small out of pain and a small lesion while he was hiking, progressed.  No fevers or chills headache or vision changes chest pain dyspnea cough nausea vomiting abdominal pain dysuria diarrhea.    In the ED afebrile normal pulse and respiratory rate hypertensive normal room air oxygen saturation.  CBC unremarkable CMP alk phos 126 blood cultures obtained, CT pelvis with contrast shows right inferior gluteal fold pudendal rim-enhancing fluid collection measuring 5 x 2 cm in size with surrounding inflammatory change/cellulitis consistent with abscess.  ERP performed I&D, obtain cultures, start antibiotics, subsequently referred to hospitalist for admission.    I discussed the plan of care with patient, bedside RN, charge RN, , and pharmacy.    Review of Systems  Review of Systems   Skin:         Tenderness swelling erythema right groin       Past Medical History   has a past medical history of Diabetes ([facility]), DVT (deep venous thrombosis) ([facility]), Hyponatremia (9/22/2021), and Nausea (9/23/2021).    Surgical History   has a past surgical history that includes cervical disk and fusion anterior (2004); fusion, spine, lumbar, plif (2022); and fusion (Bilateral).     Family History  family history includes Breast Cancer in his mother.     Social History    reports that he has quit smoking. His smoking use included cigarettes. He has a 70 pack-year smoking history. He has never used smokeless tobacco. He reports that he does not currently use alcohol. He reports current drug use. Drugs: Marijuana and Inhaled.    Allergies  No Known Allergies    Medications  Prior to Admission Medications   Prescriptions Last Dose Informant Patient Reported? Taking?   Alcohol Swabs   No No   Sig: Wipe site with prep pad prior to injection.   Blood Glucose Meter Kit   No No   Sig: Check blood sugar once in the morning and once in the evening.   Blood Glucose Test Strips   No No   Sig: Use one strip once in the morning and one in the evening.   Insulin Pen Needle 32 G x 4 mm   No No   Sig: Use one pen needle in pen device to inject insulin once daily.   Lancets   No No   Sig: Lancets order: Lancets for covered glucometer. Sig: use in morning and once in the evening   TRUE METRIX BLOOD GLUCOSE TEST strip   No No   Sig: USE ONE STRIP ONCE IN THE MORNING AND ONE IN THE EVENING.   apixaban (ELIQUIS) 5mg Tab   No No   Sig: Take 1 Tablet by mouth 2 times a day.   atorvastatin (LIPITOR) 20 MG Tab   No No   Sig: TAKE 1 TABLET BY MOUTH EVERY DAY IN THE EVENING   Patient not taking: Reported on 12/1/2023   insulin glargine 100 UNIT/ML SC SOPN injection   No No   Sig: Inject 10 Units under the skin every evening.   lisinopril (PRINIVIL) 10 MG Tab   No No   Sig: Take 0.5 Tablets by mouth every day.   magnesium gluconate (MAG-G) 500 MG tablet   Yes No   Sig: Take 500 mg by mouth 3 times a day.   tamsulosin (FLOMAX) 0.4 MG capsule 3/10/2025 Morning  No Yes   Sig: Take 1 Capsule by mouth 1/2 hour after breakfast.      Facility-Administered Medications: None       Physical Exam  Temp:  [36.6 °C (97.8 °F)-36.7 °C (98 °F)] 36.6 °C (97.8 °F)  Pulse:  [79-91] 80  Resp:  [16-18] 18  BP: (120-152)/(57-86) 120/86  SpO2:  [92 %-98 %] 95 %  Blood Pressure: 120/86   Temperature: 36.6 °C (97.8 °F)   Pulse: 80    Respiration: 18   Pulse Oximetry: 95 %       Physical Exam  Vitals and nursing note reviewed.   Constitutional:       General: He is not in acute distress.     Appearance: He is not toxic-appearing.   HENT:      Head: Normocephalic and atraumatic.      Nose: Nose normal. No rhinorrhea.      Mouth/Throat:      Mouth: Mucous membranes are moist.      Pharynx: Oropharynx is clear.   Eyes:      General: No scleral icterus.     Extraocular Movements: Extraocular movements intact.      Conjunctiva/sclera: Conjunctivae normal.   Cardiovascular:      Rate and Rhythm: Normal rate and regular rhythm.      Pulses: Normal pulses.      Heart sounds: No murmur heard.  Pulmonary:      Effort: Pulmonary effort is normal. No respiratory distress.      Breath sounds: Normal breath sounds. No wheezing, rhonchi or rales.   Abdominal:      General: There is no distension.      Palpations: Abdomen is soft.      Tenderness: There is no abdominal tenderness. There is no guarding or rebound.   Musculoskeletal:         General: Swelling and tenderness present. Normal range of motion.      Cervical back: Normal range of motion and neck supple. No rigidity.      Comments: Right groin abscess status post I&D small incision with surrounding cellulitis erythema tenderness, loop drain protruding   Skin:     General: Skin is warm and dry.      Capillary Refill: Capillary refill takes less than 2 seconds.      Findings: Erythema present.   Neurological:      General: No focal deficit present.      Mental Status: He is alert and oriented to person, place, and time. Mental status is at baseline.      Cranial Nerves: No cranial nerve deficit.      Sensory: No sensory deficit.      Motor: No weakness.      Coordination: Coordination normal.   Psychiatric:         Mood and Affect: Mood normal.         Behavior: Behavior normal.         Thought Content: Thought content normal.         Judgment: Judgment normal.         Laboratory:  Recent Labs      "03/11/25  1456   WBC 6.7   RBC 4.95   HEMOGLOBIN 15.5   HEMATOCRIT 44.7   MCV 90.3   MCH 31.3   MCHC 34.7   RDW 46.8   PLATELETCT 117*   MPV 10.3     Recent Labs     03/11/25  1456   SODIUM 136   POTASSIUM 4.2   CHLORIDE 104   CO2 22   GLUCOSE 98   BUN 11   CREATININE 0.98   CALCIUM 9.1     Recent Labs     03/11/25  1456   ALTSGPT 32   ASTSGOT 25   ALKPHOSPHAT 126*   TBILIRUBIN 0.9   GLUCOSE 98         No results for input(s): \"NTPROBNP\" in the last 72 hours.      No results for input(s): \"TROPONINT\" in the last 72 hours.    Imaging:  CT-PELVIS WITH   Final Result      1.  Right inferior gluteal fold/pudendal rim-enhancing fluid collection measuring 5 x 2 cm in size with surrounding inflammatory change/cellulitis consistent with abscess.      2.  No evidence of intrapelvic fluid collection or inflammatory change.      3.  Sigmoid diverticulosis          no X-Ray or EKG requiring interpretation    Assessment/Plan:  Justification for Admission Status  I anticipate this patient will require at least two midnights for appropriate medical management, necessitating inpatient admission because groin/perineal cellulitis and abscess status postdrainage    * Cellulitis and abscess of leg- (present on admission)  Assessment & Plan  Status post I&D in the ED, follow cultures.  Discussed with pharmacy, recommend ceftriaxone, vancomycin, Flagyl  Check MRSA nares.      Hypertension- (present on admission)  Assessment & Plan  Continue lisinopril    Benign prostatic hyperplasia- (present on admission)  Assessment & Plan  Continue Flomax, monitor urine output    History of DVT (deep vein thrombosis)- (present on admission)  Assessment & Plan  Continue Eliquis        VTE prophylaxis: SCDs/TEDs  Total time spent on admission 77 minutes.    This included my review with ER physician, review of ED events, patient's history, outside records, recent records, face to face interview, physical examination; my review of lab results (CBC, " chemistry panel), imaging review (CXR) and ECG. Also includes counseling patient on admission, treatment plan, and documentation of encounter.

## 2025-03-12 NOTE — ED NOTES
Report given to SIMONE Trinidad. Pt transported to  via transport tech. Pt's chart and all belongings present.

## 2025-03-12 NOTE — ASSESSMENT & PLAN NOTE
Status post I&D in the ED, follow cultures.  Discussed with pharmacy, recommend ceftriaxone, vancomycin, Flagyl  Check MRSA nares.

## 2025-03-12 NOTE — PROGRESS NOTES
Patient upset about noise in room from new admission. This RN in another room, charge nurse went to check on pt, pt already dressed and taking off tape from IV. Charge nurse removed IV and walked patient to elevator. Patient alert and oriented x4, not wanting to discuss staying. Loop drain still in place, not able to remove before leaving AMA.

## 2025-03-12 NOTE — PROGRESS NOTES
4 Eyes Skin Assessment Completed by SIMONE Verde and SIMONE Guerra.    Head WDL  Ears WDL  Nose WDL  Mouth WDL  Neck WDL  Breast/Chest WDL  Shoulder Blades WDL  Spine WDL  (R) Arm/Elbow/Hand WDL  (L) Arm/Elbow/Hand WDL  Abdomen WDL  Groin WDL  Scrotum/Coccyx/Buttocks Redness and Blanching  (R) Leg Abscess with drain to back of leg      (L) Leg WDL  (R) Heel/Foot/Toe Redness and Blanching  (L) Heel/Foot/Toe Redness and Blanching          Devices In Places PIV      Interventions In Place Pillows and Pressure Redistribution Mattress    Possible Skin Injury Yes    Pictures Uploaded Into Epic Yes  Wound Consult Placed N/A  RN Wound Prevention Protocol Ordered No

## 2025-03-12 NOTE — CARE PLAN
The patient is Stable - Low risk of patient condition declining or worsening    Shift Goals  Clinical Goals: IV abx, admission profile, pt safety  Patient Goals: Rest, comfort    Progress made toward(s) clinical / shift goals:  Patient alert and oriented x4. Patient c/o pain, administered medications per MAR. Respirations even and unlabored, on room air. Patient with loop drain to back of right leg. Patient able to make needs known, bed in lowest position, call light within reach.      Problem: Knowledge Deficit - Standard  Goal: Patient and family/care givers will demonstrate understanding of plan of care, disease process/condition, diagnostic tests and medications  Outcome: AMA     Problem: Pain - Standard  Goal: Alleviation of pain or a reduction in pain to the patient’s comfort goal  Outcome: AMA       Patient is not progressing towards the following goals:

## 2025-03-15 LAB
BACTERIA WND AEROBE CULT: NORMAL
GRAM STN SPEC: NORMAL
SIGNIFICANT IND 70042: NORMAL
SITE SITE: NORMAL
SOURCE SOURCE: NORMAL

## 2025-03-16 LAB
BACTERIA BLD CULT: NORMAL
BACTERIA BLD CULT: NORMAL
SIGNIFICANT IND 70042: NORMAL
SIGNIFICANT IND 70042: NORMAL
SITE SITE: NORMAL
SITE SITE: NORMAL
SOURCE SOURCE: NORMAL
SOURCE SOURCE: NORMAL

## 2025-03-29 ENCOUNTER — HOSPITAL ENCOUNTER (EMERGENCY)
Facility: MEDICAL CENTER | Age: 65
End: 2025-03-29
Attending: EMERGENCY MEDICINE
Payer: COMMERCIAL

## 2025-03-29 VITALS
SYSTOLIC BLOOD PRESSURE: 126 MMHG | DIASTOLIC BLOOD PRESSURE: 74 MMHG | WEIGHT: 226.63 LBS | TEMPERATURE: 97.9 F | OXYGEN SATURATION: 93 % | HEART RATE: 74 BPM | HEIGHT: 76 IN | RESPIRATION RATE: 18 BRPM | BODY MASS INDEX: 27.6 KG/M2

## 2025-03-29 DIAGNOSIS — Z51.89 ENCOUNTER FOR WOUND RE-CHECK: ICD-10-CM

## 2025-03-29 PROCEDURE — 99282 EMERGENCY DEPT VISIT SF MDM: CPT

## 2025-03-29 ASSESSMENT — FIBROSIS 4 INDEX: FIB4 SCORE: 2.38

## 2025-03-29 NOTE — ED TRIAGE NOTES
"Chief Complaint   Patient presents with    Wound Check     Reports abscess under R buttock and states \"they sewed in something rubber and I need to get it out\". Denies fevers/chills.     Physical Exam  Pulmonary:      Effort: Pulmonary effort is normal.   Skin:     General: Skin is warm and dry.   Neurological:      Mental Status: He is alert.       BP (!) 159/92   Pulse 80   Temp 35.8 °C (96.5 °F) (Temporal)   Resp 20   Ht 1.93 m (6' 4\")   Wt 103 kg (226 lb 10.1 oz)   SpO2 96%   BMI 27.59 kg/m²     "

## 2025-03-29 NOTE — DISCHARGE INSTRUCTIONS
Your infection appears to be healing well.  Ensure that you clean the area with soap and water and monitor for any signs of infection such as fevers, foul drainage or other concerns and seek medical attention for these

## 2025-03-29 NOTE — ED PROVIDER NOTES
"ED Provider Note    CHIEF COMPLAINT  Chief Complaint   Patient presents with    Wound Check     Reports abscess under R buttock and states \"they sewed in something rubber and I need to get it out\". Denies fevers/chills.       EXTERNAL RECORDS REVIEWED  Inpatient Notes from admission on March 11 patient was admitted for abscess and left AMA    HPI/ROS  LIMITATION TO HISTORY   Select: : None  OUTSIDE HISTORIAN(S):  none    Ajit Gregory is a 64 y.o. male who presents to have his drain removed.  Patient was admitted at [facility] for an abscess however he left prior to having his drain removed.  He is here today to have the drain removed.  He reports no fevers, no drainage, he does have some irritation from the drain itself.  No other concerns    PAST MEDICAL HISTORY   has a past medical history of Diabetes (HCC), DVT (deep venous thrombosis) ([facility]), Hyponatremia (9/22/2021), and Nausea (9/23/2021).    SURGICAL HISTORY   has a past surgical history that includes cervical disk and fusion anterior (2004); fusion, spine, lumbar, plif (2022); and fusion (Bilateral).    FAMILY HISTORY  Family History   Problem Relation Age of Onset    Breast Cancer Mother        SOCIAL HISTORY  Social History     Tobacco Use    Smoking status: Former     Current packs/day: 2.00     Average packs/day: 2.0 packs/day for 35.0 years (70.0 ttl pk-yrs)     Types: Cigarettes    Smokeless tobacco: Never   Vaping Use    Vaping status: Never Used   Substance and Sexual Activity    Alcohol use: Not Currently    Drug use: Yes     Types: Marijuana, Inhaled     Comment: daily    Sexual activity: Not on file       CURRENT MEDICATIONS  Home Medications       Reviewed by Charlene Taylor R.N. (Registered Nurse) on 03/29/25 at 1414  Med List Status: Not Addressed     Medication Last Dose Status   Alcohol Swabs  Active   apixaban (ELIQUIS) 5mg Tab  Active   atorvastatin (LIPITOR) 20 MG Tab  Active   Blood Glucose Meter Kit  Active   Blood Glucose " "Test Strips  Active   insulin glargine 100 UNIT/ML SC SOPN injection  Active   Insulin Pen Needle 32 G x 4 mm  Active   Lancets  Active   lisinopril (PRINIVIL) 10 MG Tab  Active   magnesium gluconate (MAG-G) 500 MG tablet  Active   tamsulosin (FLOMAX) 0.4 MG capsule  Active   TRUE METRIX BLOOD GLUCOSE TEST strip  Active                    ALLERGIES  No Known Allergies    PHYSICAL EXAM  VITAL SIGNS: BP (!) 159/92   Pulse 80   Temp 35.8 °C (96.5 °F) (Temporal)   Resp 20   Ht 1.93 m (6' 4\")   Wt 103 kg (226 lb 10.1 oz)   SpO2 96%   BMI 27.59 kg/m²      Pulse ox interpretation: I interpret this pulse ox as normal.  Constitutional: Alert in no apparent distress.  HENT: Normocephalic, Atraumatic, Bilateral external ears normal. Nose normal.   Eyes: Pupils are equal and reactive. Conjunctiva normal, non-icteric.   Heart: Regular rate   Lungs: No respiratory distress  Skin: Warm, Dry, No erythema, No rash, over the right buttock there is a loop drain in place, no surrounding erythema, fluctuance induration or drainage  Neurologic: Alert, Grossly non-focal.   Psychiatric: Affect normal, Judgment normal, Mood normal, Appears appropriate           No orders to display       COURSE & MEDICAL DECISION MAKING    ASSESSMENT, COURSE AND PLAN  Care Narrative: 3:12 PM  Patient is evaluated at the bedside.  On exam there does not appear to be any complication, reinfection or other issue with his drain.  This was removed without issue and patient will be discharged             PROBLEMS MANAGED  # Wound check and drain removal.  As above, no findings of complication.  Educated on home care return precautions    DISPOSITION AND DISCUSSIONS  The patient will return for new or worsening symptoms and is stable at the time of discharge.    The patient is referred to a primary physician for blood pressure management, diabetic screening, and for all other preventative health concerns.      DISPOSITION:  Patient will be discharged home " in stable condition.    FOLLOW UP:  Kalyan Taylor D.O.  [address] [facility] [id number] [phone]      As needed      OUTPATIENT MEDICATIONS:  New Prescriptions    No medications on file         FINAL DIAGNOSIS  1. Encounter for wound re-check         Electronically signed by: Praneeth Reyes M.D., 3/29/2025 3:00 PM

## 2025-03-29 NOTE — ED NOTES
Dc instructions reviewed with pt. Aware of need to keep area/wound clean with soap and water, dry, moniter for s/s of infection and f/u if concerns for same